# Patient Record
Sex: MALE | Race: BLACK OR AFRICAN AMERICAN | NOT HISPANIC OR LATINO | Employment: FULL TIME | URBAN - NONMETROPOLITAN AREA
[De-identification: names, ages, dates, MRNs, and addresses within clinical notes are randomized per-mention and may not be internally consistent; named-entity substitution may affect disease eponyms.]

---

## 2017-07-14 ENCOUNTER — LAB (OUTPATIENT)
Dept: LAB | Facility: HOSPITAL | Age: 52
End: 2017-07-14

## 2017-07-14 ENCOUNTER — OFFICE VISIT (OUTPATIENT)
Dept: CARDIOLOGY | Facility: CLINIC | Age: 52
End: 2017-07-14

## 2017-07-14 VITALS
DIASTOLIC BLOOD PRESSURE: 80 MMHG | SYSTOLIC BLOOD PRESSURE: 130 MMHG | HEART RATE: 72 BPM | BODY MASS INDEX: 43.12 KG/M2 | HEIGHT: 71 IN | WEIGHT: 308 LBS

## 2017-07-14 DIAGNOSIS — I10 ESSENTIAL HYPERTENSION: ICD-10-CM

## 2017-07-14 DIAGNOSIS — E66.01 MORBID OBESITY, UNSPECIFIED OBESITY TYPE (HCC): ICD-10-CM

## 2017-07-14 DIAGNOSIS — R06.02 SHORTNESS OF BREATH: ICD-10-CM

## 2017-07-14 DIAGNOSIS — E11.9 TYPE 2 DIABETES MELLITUS WITHOUT COMPLICATION, WITHOUT LONG-TERM CURRENT USE OF INSULIN (HCC): ICD-10-CM

## 2017-07-14 DIAGNOSIS — I42.9 CARDIOMYOPATHY (HCC): ICD-10-CM

## 2017-07-14 DIAGNOSIS — E11.9 TYPE 2 DIABETES MELLITUS WITHOUT COMPLICATION, WITHOUT LONG-TERM CURRENT USE OF INSULIN (HCC): Primary | ICD-10-CM

## 2017-07-14 LAB
ALBUMIN SERPL-MCNC: 4.2 G/DL (ref 3.4–4.8)
ALBUMIN/GLOB SERPL: 1.5 G/DL (ref 1.1–1.8)
ALP SERPL-CCNC: 131 U/L (ref 38–126)
ALT SERPL W P-5'-P-CCNC: 249 U/L (ref 21–72)
ANION GAP SERPL CALCULATED.3IONS-SCNC: 10 MMOL/L (ref 5–15)
ARTICHOKE IGE QN: 103 MG/DL (ref 1–129)
AST SERPL-CCNC: 91 U/L (ref 17–59)
BILIRUB SERPL-MCNC: 0.9 MG/DL (ref 0.2–1.3)
BUN BLD-MCNC: 10 MG/DL (ref 7–21)
BUN/CREAT SERPL: 9 (ref 7–25)
CALCIUM SPEC-SCNC: 9.1 MG/DL (ref 8.4–10.2)
CHLORIDE SERPL-SCNC: 103 MMOL/L (ref 95–110)
CHOLEST SERPL-MCNC: 176 MG/DL (ref 0–199)
CO2 SERPL-SCNC: 25 MMOL/L (ref 22–31)
CREAT BLD-MCNC: 1.11 MG/DL (ref 0.7–1.3)
DEPRECATED RDW RBC AUTO: 45.3 FL (ref 35.1–43.9)
ERYTHROCYTE [DISTWIDTH] IN BLOOD BY AUTOMATED COUNT: 13.3 % (ref 11.5–14.5)
GFR SERPL CREATININE-BSD FRML MDRD: 84 ML/MIN/1.73 (ref 56–130)
GLOBULIN UR ELPH-MCNC: 2.8 GM/DL (ref 2.3–3.5)
GLUCOSE BLD-MCNC: 98 MG/DL (ref 60–100)
HBA1C MFR BLD: 6.09 % (ref 4–5.6)
HCT VFR BLD AUTO: 42.1 % (ref 39–49)
HDLC SERPL-MCNC: 33 MG/DL (ref 60–200)
HGB BLD-MCNC: 13.8 G/DL (ref 13.7–17.3)
LDLC/HDLC SERPL: 3.42 {RATIO} (ref 0–3.55)
MCH RBC QN AUTO: 30.5 PG (ref 26.5–34)
MCHC RBC AUTO-ENTMCNC: 32.8 G/DL (ref 31.5–36.3)
MCV RBC AUTO: 92.9 FL (ref 80–98)
NT-PROBNP SERPL-MCNC: 229 PG/ML (ref 0–900)
PLATELET # BLD AUTO: 218 10*3/MM3 (ref 150–450)
PMV BLD AUTO: 10.4 FL (ref 8–12)
POTASSIUM BLD-SCNC: 3.8 MMOL/L (ref 3.5–5.1)
PROT SERPL-MCNC: 7 G/DL (ref 6.3–8.6)
RBC # BLD AUTO: 4.53 10*6/MM3 (ref 4.37–5.74)
SODIUM BLD-SCNC: 138 MMOL/L (ref 137–145)
T4 FREE SERPL-MCNC: 1 NG/DL (ref 0.78–2.19)
TRIGL SERPL-MCNC: 151 MG/DL (ref 20–199)
TSH SERPL DL<=0.05 MIU/L-ACNC: 0.87 MIU/ML (ref 0.46–4.68)
WBC NRBC COR # BLD: 5.39 10*3/MM3 (ref 3.2–9.8)

## 2017-07-14 PROCEDURE — 83036 HEMOGLOBIN GLYCOSYLATED A1C: CPT | Performed by: INTERNAL MEDICINE

## 2017-07-14 PROCEDURE — 80061 LIPID PANEL: CPT | Performed by: INTERNAL MEDICINE

## 2017-07-14 PROCEDURE — 80053 COMPREHEN METABOLIC PANEL: CPT | Performed by: INTERNAL MEDICINE

## 2017-07-14 PROCEDURE — 84439 ASSAY OF FREE THYROXINE: CPT | Performed by: INTERNAL MEDICINE

## 2017-07-14 PROCEDURE — 85027 COMPLETE CBC AUTOMATED: CPT | Performed by: INTERNAL MEDICINE

## 2017-07-14 PROCEDURE — 83880 ASSAY OF NATRIURETIC PEPTIDE: CPT | Performed by: INTERNAL MEDICINE

## 2017-07-14 PROCEDURE — 99214 OFFICE O/P EST MOD 30 MIN: CPT | Performed by: INTERNAL MEDICINE

## 2017-07-14 PROCEDURE — 36415 COLL VENOUS BLD VENIPUNCTURE: CPT | Performed by: INTERNAL MEDICINE

## 2017-07-14 PROCEDURE — 84443 ASSAY THYROID STIM HORMONE: CPT | Performed by: INTERNAL MEDICINE

## 2017-07-14 RX ORDER — FUROSEMIDE 20 MG/1
20 TABLET ORAL 2 TIMES DAILY
Qty: 180 TABLET | Refills: 3 | Status: SHIPPED | OUTPATIENT
Start: 2017-07-14 | End: 2018-05-02

## 2017-07-14 RX ORDER — SPIRONOLACTONE 25 MG/1
25 TABLET ORAL DAILY
Qty: 90 TABLET | Refills: 3 | Status: SHIPPED | OUTPATIENT
Start: 2017-07-14 | End: 2017-07-14 | Stop reason: SDUPTHER

## 2017-07-14 RX ORDER — CARVEDILOL 12.5 MG/1
12.5 TABLET ORAL 2 TIMES DAILY
Qty: 180 TABLET | Refills: 6 | Status: SHIPPED | OUTPATIENT
Start: 2017-07-14 | End: 2017-12-19 | Stop reason: SDUPTHER

## 2017-07-14 RX ORDER — DAPAGLIFLOZIN 5 MG/1
5 TABLET, FILM COATED ORAL DAILY
Qty: 30 TABLET | Refills: 8 | Status: SHIPPED | OUTPATIENT
Start: 2017-07-14 | End: 2020-03-16 | Stop reason: ALTCHOICE

## 2017-07-14 RX ORDER — CARVEDILOL 6.25 MG/1
6.25 TABLET ORAL 2 TIMES DAILY
Qty: 180 TABLET | Refills: 3 | Status: SHIPPED | OUTPATIENT
Start: 2017-07-14 | End: 2017-07-14 | Stop reason: SDUPTHER

## 2017-07-14 RX ORDER — LISINOPRIL 20 MG/1
20 TABLET ORAL DAILY
Qty: 90 TABLET | Refills: 6 | Status: SHIPPED | OUTPATIENT
Start: 2017-07-14 | End: 2017-12-19 | Stop reason: SDUPTHER

## 2017-07-14 RX ORDER — LISINOPRIL 10 MG/1
10 TABLET ORAL DAILY
Qty: 90 TABLET | Refills: 3 | Status: SHIPPED | OUTPATIENT
Start: 2017-07-14 | End: 2017-07-14 | Stop reason: SDUPTHER

## 2017-07-14 RX ORDER — SPIRONOLACTONE 25 MG/1
25 TABLET ORAL DAILY
Qty: 90 TABLET | Refills: 3 | Status: SHIPPED | OUTPATIENT
Start: 2017-07-14 | End: 2018-07-24 | Stop reason: SDUPTHER

## 2017-07-14 RX ORDER — FUROSEMIDE 20 MG/1
20 TABLET ORAL 2 TIMES DAILY
Qty: 180 TABLET | Refills: 3 | Status: SHIPPED | OUTPATIENT
Start: 2017-07-14 | End: 2017-07-14 | Stop reason: SDUPTHER

## 2017-07-14 NOTE — PROGRESS NOTES
Collin Teresa  52 y.o. male      1. Type 2 diabetes mellitus without complication, without long-term current use of insulin    2. Essential hypertension    3. Morbid obesity, unspecified obesity type    4. Shortness of breath    5. Cardiomyopathy        Chief complaint - follow-up dilated cardiomyopathy      History of present [id number]-old gentleman who had a normal cath in 2011 had an EF of 15-20%, was started on CHF therapy and his EF is improved to 50%.  And he is totally asymptomatic now.  He has no orthopnea or PND and is NYHA class II symptoms.  I asked him to lose more weight .  We'll continue the Ace inhibitors Coreg and the Lasix along with Aldactone for now. He is a diabetic is on for farxiga and abdomen to check all the labs today.  He is back to his normal work and I increased his Coreg and also his lisinopril.            No Known Allergies      Past Medical History:   Diagnosis Date   • Abdominal pain    • Cardiomegaly    • Chest pain    • Essential hypertension    • History of EKG 09/19/2016    [facility] center   • History of toxic effects     carbon monxide   • Morbid obesity    • Peripheral edema          Past Surgical History:   Procedure Laterality Date   • CARDIAC CATHETERIZATION      2011 no cad   • ENDOSCOPY           Family History   Problem Relation Age of Onset   • Diabetes Other    • Hypertension Other    • Coronary artery disease Neg Hx          Social History     Social History   • Marital status:      Spouse name: N/A   • Number of children: N/A   • Years of education: N/A     Occupational History   • Not on file.     Social History Main Topics   • Smoking status: Never Smoker   • Smokeless tobacco: Never Used   • Alcohol use No   • Drug use: No   • Sexual activity: Defer     Other Topics Concern   • Not on file     Social History Narrative         Current Outpatient Prescriptions   Medication Sig Dispense Refill   • carvedilol (COREG) 12.5 MG tablet Take 1 tablet by mouth 2  "(Two) Times a Day. 180 tablet 6   • FARXIGA 5 MG tablet tablet Take 1 tablet by mouth Daily. 30 tablet 8   • furosemide (LASIX) 20 MG tablet Take 1 tablet by mouth 2 (Two) Times a Day. 180 tablet 3   • lisinopril (PRINIVIL,ZESTRIL) 20 MG tablet Take 1 tablet by mouth Daily. 90 tablet 6   • promethazine-codeine (PHENERGAN with CODEINE) 6.25-10 MG/5ML syrup   0   • spironolactone (ALDACTONE) 25 MG tablet Take 1 tablet by mouth Daily. 90 tablet 3     No current facility-administered medications for this visit.          Review of Systems     Constitution: Denies any fatigue, fever or chills    HENT: Denies any headache, hearing impairment, nasal discharge or sore throat    Eyes: Denies any blurring of vision, or photophobia     Cardivascular - As per history of present illness     Respiratory system-denies any COPD, shortness of breath     Endocrine:   history of  diabetes       Musculoskeletal:  No history of arthritis, musculoskeletal problems    Gastrointestinal: No nausea, vomiting, or melena    Genitourinary: No dysuria or hematuria    Neurological:   No history of seizure disorder, stroke, memory problems    Psychiatric/Behavioral:        No history of depression, bipolar disorder or schizophrenia     Hematological- no history of easy bruising or any bleeding diathesis            OBJECTIVE    /80  Pulse 72  Ht 71\" (180.3 cm)  Wt (!) 308 lb (140 kg)  BMI 42.96 kg/m2      Physical Exam     Constitutional: is oriented to person, place, and time.     Skin-warm and dry    Well developed and nourished in no acute distress      Head: Normocephalic and atraumatic.     Eyes: Pupils are equal, round, and reactive to light.     Neck: Neck supple. No bruit in the carotids, no elevation of JVD    Cardiovascular: [place] in the fifth intercostal space, regular rate, and  Rhythm,  S1 greater than S2, no S3 or S4, no gallop   .    Pulmonary/Chest:   Air  Entry is equal on both sides  No wheezing or crackles,    "   Abdominal: Soft.  No hepatosplenomegaly, bowel sounds are present    Musculoskeletal: No kyphoscoliosis, no significant thickening of the joints    Neurological: is alert and oriented to person, place, and time.    cranial nerve are intact .   No motor or sensory deficit    Extremities-no edema,  pulses are felt equally on both sides, no radial femoral delay      Psychiatric: He has a normal mood and affect.                  His behavior is normal.           Procedures          A/P    Dilated cardiomyopathy-EF improved from 15-20% to 50%.  His blood pressure is still high, I increased Coreg to 12.5 twice a day and increased lisinopril to 20 mg daily.  We'll do all the labs today.    Diabetes is on Farxiga 5 mg daily.    Obesity talked to him about diet and exercise and losing some or weight he did gain 16 pounds from the last time I saw him.  He has avoided all sodas      Follow-up in 3 months        Zachary Nguyen MD  7/14/2017  9:13 AM

## 2017-12-19 ENCOUNTER — OFFICE VISIT (OUTPATIENT)
Dept: CARDIOLOGY | Facility: CLINIC | Age: 52
End: 2017-12-19

## 2017-12-19 VITALS
HEIGHT: 71 IN | SYSTOLIC BLOOD PRESSURE: 160 MMHG | HEART RATE: 81 BPM | WEIGHT: 315 LBS | DIASTOLIC BLOOD PRESSURE: 100 MMHG | BODY MASS INDEX: 44.1 KG/M2

## 2017-12-19 DIAGNOSIS — E66.01 MORBID OBESITY (HCC): ICD-10-CM

## 2017-12-19 DIAGNOSIS — I42.0 DILATED CARDIOMYOPATHY (HCC): ICD-10-CM

## 2017-12-19 DIAGNOSIS — E11.9 TYPE 2 DIABETES MELLITUS WITHOUT COMPLICATION, WITHOUT LONG-TERM CURRENT USE OF INSULIN (HCC): ICD-10-CM

## 2017-12-19 DIAGNOSIS — I10 ESSENTIAL HYPERTENSION: Primary | ICD-10-CM

## 2017-12-19 PROCEDURE — 99214 OFFICE O/P EST MOD 30 MIN: CPT | Performed by: INTERNAL MEDICINE

## 2017-12-19 RX ORDER — LISINOPRIL 20 MG/1
40 TABLET ORAL DAILY
Qty: 90 TABLET | Refills: 6 | Status: SHIPPED | OUTPATIENT
Start: 2017-12-19 | End: 2017-12-19 | Stop reason: SDUPTHER

## 2017-12-19 RX ORDER — CARVEDILOL 25 MG/1
25 TABLET ORAL 2 TIMES DAILY WITH MEALS
Qty: 180 TABLET | Refills: 6 | Status: SHIPPED | OUTPATIENT
Start: 2017-12-19 | End: 2019-07-29 | Stop reason: SDUPTHER

## 2017-12-19 RX ORDER — LISINOPRIL 40 MG/1
40 TABLET ORAL DAILY
Qty: 90 TABLET | Refills: 6 | Status: SHIPPED | OUTPATIENT
Start: 2017-12-19 | End: 2020-03-16 | Stop reason: ALTCHOICE

## 2017-12-19 RX ORDER — CARVEDILOL 12.5 MG/1
12.5 TABLET ORAL 2 TIMES DAILY WITH MEALS
Qty: 180 TABLET | Refills: 6 | Status: SHIPPED | OUTPATIENT
Start: 2017-12-19 | End: 2017-12-19 | Stop reason: SDUPTHER

## 2017-12-19 NOTE — PROGRESS NOTES
Collin Teresa  52 y.o. male    12/19/2017  1. Essential hypertension    2. Type 2 diabetes mellitus without complication, without long-term current use of insulin    3. Morbid obesity    4. Dilated cardiomyopathy        Chief complaint - follow-up dilated cardiomyopathy      History of present [id number]-old gentleman who had a normal cath in 2011 had an EF of 15-20%, was started on CHF therapy and his EF is improved to 50%.  And he is totally asymptomatic now.  He has no orthopnea or PND and is NYHA class II symptoms.  I asked him to lose more weight .  We'll continue the Ace inhibitors Coreg and the Lasix along with Aldactone for now. He is a diabetic is on for farxiga and abdomen to check all the labs today.  He is back to his normal work and I increased his Coreg and  his lisinopril As his blood pressure is high and he gained about 25 pounds            No Known Allergies      Past Medical History:   Diagnosis Date   • Abdominal pain    • Cardiomegaly    • Chest pain    • Essential hypertension    • History of EKG 09/19/2016    care center   • History of toxic effects     carbon monxide   • Morbid obesity    • Peripheral edema          Past Surgical History:   Procedure Laterality Date   • CARDIAC CATHETERIZATION      2011 no cad   • ENDOSCOPY           Family History   Problem Relation Age of Onset   • Diabetes Other    • Hypertension Other    • Coronary artery disease Neg Hx          Social History     Social History   • Marital status:      Spouse name: N/A   • Number of children: N/A   • Years of education: N/A     Occupational History   • Not on file.     Social History Main Topics   • Smoking status: Never Smoker   • Smokeless tobacco: Never Used   • Alcohol use No   • Drug use: No   • Sexual activity: Defer     Other Topics Concern   • Not on file     Social History Narrative         Current Outpatient Prescriptions   Medication Sig Dispense Refill   • carvedilol (COREG) 25 MG tablet Take  "1 tablet by mouth 2 (Two) Times a Day With Meals. 180 tablet 6   • FARXIGA 5 MG tablet tablet Take 1 tablet by mouth Daily. 30 tablet 8   • furosemide (LASIX) 20 MG tablet Take 1 tablet by mouth 2 (Two) Times a Day. 180 tablet 3   • lisinopril (PRINIVIL,ZESTRIL) 40 MG tablet Take 1 tablet by mouth Daily. 90 tablet 6   • promethazine-codeine (PHENERGAN with CODEINE) 6.25-10 MG/5ML syrup   0   • spironolactone (ALDACTONE) 25 MG tablet Take 1 tablet by mouth Daily. 90 tablet 3     No current facility-administered medications for this visit.          Review of Systems     Constitution: Denies any fatigue, fever or chills    HENT: Denies any headache, hearing impairment, nasal discharge or sore throat    Eyes: Denies any blurring of vision, or photophobia     Cardivascular - As per history of present illness     Respiratory system-denies any COPD, shortness of breath     Endocrine:   history of  diabetes       Musculoskeletal:  No history of arthritis, musculoskeletal problems    Gastrointestinal: No nausea, vomiting, or melena    Genitourinary: No dysuria or hematuria    Neurological:   No history of seizure disorder, stroke, memory problems    Psychiatric/Behavioral:        No history of depression, bipolar disorder or schizophrenia     Hematological- no history of easy bruising or any bleeding diathesis            OBJECTIVE    /100  Pulse 81  Ht 180.3 cm (70.98\")  Wt (!) 151 kg (333 lb)  BMI 46.47 kg/m2      Physical Exam     Constitutional: is oriented to person, place, and time.     Skin-warm and dry    Well developed and nourished in no acute distress      Head: Normocephalic and atraumatic.     Eyes: Pupils are equal, round, and reactive to light.     Neck: Neck supple. No bruit in the carotids, no elevation of JVD    Cardiovascular: [place] in the fifth intercostal space, regular rate, and  Rhythm,  S1 greater than S2, no S3 or S4, no gallop   .    Pulmonary/Chest:   Air  Entry is equal on both sides  No " wheezing or crackles,      Abdominal: Soft.  No hepatosplenomegaly, bowel sounds are present    Musculoskeletal: No kyphoscoliosis, no significant thickening of the joints    Neurological: is alert and oriented to person, place, and time.    cranial nerve are intact .   No motor or sensory deficit    Extremities-no edema,  pulses are felt equally on both sides, no radial femoral delay      Psychiatric: He has a normal mood and affect.                  His behavior is normal.           Procedures          A/P    Dilated cardiomyopathy-EF improved from 15-20% to 50%.  His blood pressure is still high, I increased Coreg to 25 twice a day and increased lisinopril to 40 mg daily.  We'll do all the labs within the next couple weeks    Diabetes is on Farxiga 5 mg daily.    Obesity talked to him about diet and exercise and losing some or weight he did gain 26 pounds from the last time I saw him.  He will do blood pressure checked regularly at his workplace      Follow-up in 6 months        Zachary Nguyen MD  12/19/2017  4:42 PM

## 2018-05-02 ENCOUNTER — OFFICE VISIT (OUTPATIENT)
Dept: CARDIOLOGY | Facility: CLINIC | Age: 53
End: 2018-05-02

## 2018-05-02 ENCOUNTER — APPOINTMENT (OUTPATIENT)
Dept: LAB | Facility: HOSPITAL | Age: 53
End: 2018-05-02

## 2018-05-02 VITALS
WEIGHT: 315 LBS | HEIGHT: 71 IN | HEART RATE: 105 BPM | BODY MASS INDEX: 44.1 KG/M2 | OXYGEN SATURATION: 95 % | DIASTOLIC BLOOD PRESSURE: 80 MMHG | SYSTOLIC BLOOD PRESSURE: 148 MMHG

## 2018-05-02 DIAGNOSIS — E11.9 TYPE 2 DIABETES MELLITUS WITHOUT COMPLICATION, WITHOUT LONG-TERM CURRENT USE OF INSULIN (HCC): ICD-10-CM

## 2018-05-02 DIAGNOSIS — I10 ESSENTIAL HYPERTENSION: Primary | ICD-10-CM

## 2018-05-02 DIAGNOSIS — I42.2 HYPERTROPHIC NONOBSTRUCTIVE CARDIOMYOPATHY (HCC): ICD-10-CM

## 2018-05-02 DIAGNOSIS — R06.02 SOB (SHORTNESS OF BREATH): ICD-10-CM

## 2018-05-02 DIAGNOSIS — I42.0 DILATED CARDIOMYOPATHY (HCC): ICD-10-CM

## 2018-05-02 LAB
ALBUMIN SERPL-MCNC: 3.8 G/DL (ref 3.4–4.8)
ALBUMIN/GLOB SERPL: 1.2 G/DL (ref 1.1–1.8)
ALP SERPL-CCNC: 68 U/L (ref 38–126)
ALT SERPL W P-5'-P-CCNC: 48 U/L (ref 21–72)
ANION GAP SERPL CALCULATED.3IONS-SCNC: 11 MMOL/L (ref 5–15)
AST SERPL-CCNC: 28 U/L (ref 17–59)
BILIRUB SERPL-MCNC: 0.5 MG/DL (ref 0.2–1.3)
BUN BLD-MCNC: 15 MG/DL (ref 7–21)
BUN/CREAT SERPL: 11.1 (ref 7–25)
CALCIUM SPEC-SCNC: 8.7 MG/DL (ref 8.4–10.2)
CHLORIDE SERPL-SCNC: 104 MMOL/L (ref 95–110)
CO2 SERPL-SCNC: 24 MMOL/L (ref 22–31)
CREAT BLD-MCNC: 1.35 MG/DL (ref 0.7–1.3)
DEPRECATED RDW RBC AUTO: 45 FL (ref 35.1–43.9)
ERYTHROCYTE [DISTWIDTH] IN BLOOD BY AUTOMATED COUNT: 13.7 % (ref 11.5–14.5)
GFR SERPL CREATININE-BSD FRML MDRD: 67 ML/MIN/1.73 (ref 56–130)
GLOBULIN UR ELPH-MCNC: 3.1 GM/DL (ref 2.3–3.5)
GLUCOSE BLD-MCNC: 97 MG/DL (ref 60–100)
HCT VFR BLD AUTO: 43.4 % (ref 39–49)
HGB BLD-MCNC: 14.4 G/DL (ref 13.7–17.3)
MCH RBC QN AUTO: 30.3 PG (ref 26.5–34)
MCHC RBC AUTO-ENTMCNC: 33.2 G/DL (ref 31.5–36.3)
MCV RBC AUTO: 91.2 FL (ref 80–98)
NT-PROBNP SERPL-MCNC: 2620 PG/ML (ref 0–900)
PLATELET # BLD AUTO: 228 10*3/MM3 (ref 150–450)
PMV BLD AUTO: 10.4 FL (ref 8–12)
POTASSIUM BLD-SCNC: 3.9 MMOL/L (ref 3.5–5.1)
PROT SERPL-MCNC: 6.9 G/DL (ref 6.3–8.6)
RBC # BLD AUTO: 4.76 10*6/MM3 (ref 4.37–5.74)
SODIUM BLD-SCNC: 139 MMOL/L (ref 137–145)
T4 FREE SERPL-MCNC: 1.37 NG/DL (ref 0.78–2.19)
TSH SERPL DL<=0.05 MIU/L-ACNC: 1.12 MIU/ML (ref 0.46–4.68)
WBC NRBC COR # BLD: 6.75 10*3/MM3 (ref 3.2–9.8)

## 2018-05-02 PROCEDURE — 85027 COMPLETE CBC AUTOMATED: CPT | Performed by: INTERNAL MEDICINE

## 2018-05-02 PROCEDURE — 96372 THER/PROPH/DIAG INJ SC/IM: CPT | Performed by: INTERNAL MEDICINE

## 2018-05-02 PROCEDURE — 83036 HEMOGLOBIN GLYCOSYLATED A1C: CPT | Performed by: INTERNAL MEDICINE

## 2018-05-02 PROCEDURE — 83880 ASSAY OF NATRIURETIC PEPTIDE: CPT | Performed by: INTERNAL MEDICINE

## 2018-05-02 PROCEDURE — 80053 COMPREHEN METABOLIC PANEL: CPT | Performed by: INTERNAL MEDICINE

## 2018-05-02 PROCEDURE — 99214 OFFICE O/P EST MOD 30 MIN: CPT | Performed by: INTERNAL MEDICINE

## 2018-05-02 PROCEDURE — 93000 ELECTROCARDIOGRAM COMPLETE: CPT | Performed by: INTERNAL MEDICINE

## 2018-05-02 PROCEDURE — 84443 ASSAY THYROID STIM HORMONE: CPT | Performed by: INTERNAL MEDICINE

## 2018-05-02 PROCEDURE — 36415 COLL VENOUS BLD VENIPUNCTURE: CPT | Performed by: INTERNAL MEDICINE

## 2018-05-02 PROCEDURE — 84439 ASSAY OF FREE THYROXINE: CPT | Performed by: INTERNAL MEDICINE

## 2018-05-02 RX ORDER — FUROSEMIDE 10 MG/ML
40 INJECTION INTRAMUSCULAR; INTRAVENOUS ONCE
Status: COMPLETED | OUTPATIENT
Start: 2018-05-02 | End: 2018-05-02

## 2018-05-02 RX ORDER — DIGOXIN 125 MCG
125 TABLET ORAL DAILY
Qty: 30 TABLET | Refills: 11 | Status: SHIPPED | OUTPATIENT
Start: 2018-05-02 | End: 2019-07-30 | Stop reason: SDUPTHER

## 2018-05-02 RX ORDER — FUROSEMIDE 40 MG/1
40 TABLET ORAL 2 TIMES DAILY
Qty: 60 TABLET | Refills: 11 | Status: SHIPPED | OUTPATIENT
Start: 2018-05-02 | End: 2018-06-05 | Stop reason: SDUPTHER

## 2018-05-02 RX ORDER — ALBUTEROL SULFATE 90 UG/1
2 AEROSOL, METERED RESPIRATORY (INHALATION) EVERY 4 HOURS PRN
COMMUNITY
End: 2022-01-20

## 2018-05-02 RX ADMIN — FUROSEMIDE 40 MG: 10 INJECTION INTRAMUSCULAR; INTRAVENOUS at 15:30

## 2018-05-02 NOTE — PROGRESS NOTES
[facility]  OFFICE NOTE    Collin Tersea  53 y.o. male    05/02/2018  1. Essential hypertension    2. Type 2 diabetes mellitus without complication, without long-term current use of insulin    3. SOB (shortness of breath)    4. Dilated cardiomyopathy    5. Hypertrophic nonobstructive cardiomyopathy        Chief complaint -Orthopnea with shortness of breath      History of present [id number]-ynrc-old gentleman with the nonischemic cardiomyopathy by cardiac catheterization 2011 and he was on CHF therapy.  I have decreased his Lasix since his EF improved to 50% and now he has orthopnea and PND.  And there is swelling of the feet.  He is a diabetic on farxiga.  Today I gave him IV Lasix through subcutaneous route.  Increase her Lasix to 40 mg twice a day and also started Lanoxin.  We'll check an echo and all the labs today and he'll be again followed on Friday.  If he doesn't improve then we will have to admit him to the hospital.  He has gained about 25 pounds from July.        No Known Allergies      Past Medical History:   Diagnosis Date   • Abdominal pain    • Cardiomegaly    • Chest pain    • Essential hypertension    • History of EKG 09/19/2016    care center   • History of toxic effects     carbon monxide   • Morbid obesity    • Peripheral edema          Past Surgical History:   Procedure Laterality Date   • CARDIAC CATHETERIZATION      2011 no cad   • ENDOSCOPY           Family History   Problem Relation Age of Onset   • Diabetes Other    • Hypertension Other    • Coronary artery disease Neg Hx          Social History     Social History   • Marital status:      Spouse name: N/A   • Number of children: N/A   • Years of education: N/A     Occupational History   • Not on file.     Social History Main Topics   • Smoking status: Never Smoker   • Smokeless tobacco: Never Used   • Alcohol use No   • Drug use: No   • Sexual activity: Defer     Other Topics Concern   • Not on  "file     Social History Narrative   • No narrative on file         Current Outpatient Prescriptions   Medication Sig Dispense Refill   • albuterol (PROAIR HFA) 108 (90 Base) MCG/ACT inhaler Inhale 2 puffs Every 4 (Four) Hours As Needed for Wheezing.     • carvedilol (COREG) 25 MG tablet Take 1 tablet by mouth 2 (Two) Times a Day With Meals. 180 tablet 6   • FARXIGA 5 MG tablet tablet Take 1 tablet by mouth Daily. 30 tablet 8   • lisinopril (PRINIVIL,ZESTRIL) 40 MG tablet Take 1 tablet by mouth Daily. 90 tablet 6   • promethazine-codeine (PHENERGAN with CODEINE) 6.25-10 MG/5ML syrup   0   • spironolactone (ALDACTONE) 25 MG tablet Take 1 tablet by mouth Daily. 90 tablet 3   • digoxin (LANOXIN) 125 MCG tablet Take 1 tablet by mouth Daily. 30 tablet 11   • furosemide (LASIX) 40 MG tablet Take 1 tablet by mouth 2 (Two) Times a Day. 60 tablet 11     No current facility-administered medications for this visit.          Review of Systems     Constitution: Denies any fatigue, fever or chills    HENT: Denies any headache, hearing impairment,     Eyes: Denies any blurring of vision, or photophobia     Cardivascular - As per history of present illness     Respiratory system-Shortness of breath NYHA class IIb   sleep apnea.     Endocrine:   history of hyperlipidemia, diabetes,                             Musculoskeletal:  No history of arthritis with musculoskeletal problems    Gastrointestinal: No nausea, vomiting, or melena    Genitourinary: No dysuria or hematuria    Neurological:   No history of seizure disorder, stroke, memory problems    Psychiatric/Behavioral:        No history of depression    Hematological- no history of easy bruising            OBJECTIVE    /80 (BP Location: Right arm, Patient Position: Sitting, Cuff Size: Adult)   Pulse 105   Ht 180.3 cm (71\")   Wt (!) 152 kg (335 lb)   SpO2 95%   BMI 46.72 kg/m²       Physical Exam     Constitutional: is oriented to person, place, and time.     Skin-warm " and dry    Well developed and nourished in no acute distress      Head: Normocephalic and atraumatic.     Eyes: Pupils are equal    Neck: Neck supple. No bruit in the carotids JVD is distended and elevated    Cardiovascular: [place] in the fifth intercostal space   Regular rate, and  Rhythm,    S1 greater than S2, no S3 or S4, no gallop     Pulmonary/Chest:   Air  Entry is equal on both sides  No wheezing or crackles,      Abdominal: Soft.  No hepatosplenomegaly, bowel sounds are present    Musculoskeletal: No kyphoscoliosis, no significant thickening of the joints    Neurological: is alert and oriented to person, place, and time.    cranial nerve are intact .   No motor or sensory deficit    Extremities- 1+ bilateral edema, no radial femoral delay      Psychiatric: He has a normal mood and affect.                  His behavior is normal.             ECG 12 Lead  Date/Time: 5/2/2018 3:40 PM  Performed by: CHITO RIOS  Authorized by: CHITO RIOS   Comparison: not compared with previous ECG   Rhythm: sinus rhythm  Comments: Sinus rhythm with left atrial abnormality with interventricular conduction delay with nonprogression of R-wave in V1 to V5              Lab Results   Component Value Date    WBC 5.39 07/14/2017    HGB 13.8 07/14/2017    HCT 42.1 07/14/2017    MCV 92.9 07/14/2017     07/14/2017     Lab Results   Component Value Date    GLUCOSE 98 07/14/2017    BUN 10 07/14/2017    CREATININE 1.11 07/14/2017    EGFRIFAFRI 84 07/14/2017    BCR 9.0 07/14/2017    CO2 25.0 07/14/2017    CALCIUM 9.1 07/14/2017    ALBUMIN 4.20 07/14/2017    LABIL2 1.5 07/14/2017    AST 91 (H) 07/14/2017     (H) 07/14/2017     Lab Results   Component Value Date    CHOL 176 07/14/2017     Lab Results   Component Value Date    TRIG 151 07/14/2017     Lab Results   Component Value Date    HDL 33 (L) 07/14/2017     No components found for: LDLCALC  Lab Results   Component Value Date     07/14/2017     No  results found for: HDLLDLRATIO  No components found for: CHOLHDL  Lab Results   Component Value Date    HGBA1C 6.09 (H) 07/14/2017     Lab Results   Component Value Date    TSH 0.870 07/14/2017                  A/P    Decompensated CHF with history of nonischemic cardiomyopathy, gave Lasix 40 mg subcutaneous, increase Lasix to 40 mg twice a day.  We'll recheck again on Friday.  Will check labs of BNP CMP, CBC hemoglobin A1c.  Will get an echo to reassess the ejection fraction as his most recent echo showed an EF of 50%.    Diabetes on oral agents with farxiga.    Follow-up on Friday in the CHF clinic and if not better will admit him to the hospital.            This document has been electronically signed by Zachary Nguyen MD on May 2, 2018 3:34 PM       EMR Dragon/Transcription disclaimer:   Some of this note may be an electronic transcription/translation of spoken language to printed text. The electronic translation of spoken language may permit erroneous, or at times, nonsensical words or phrases to be inadvertently transcribed; Although I have reviewed the note for such errors, some may still exist.

## 2018-05-03 LAB — HBA1C MFR BLD: 5.9 % (ref 4–5.6)

## 2018-05-03 NOTE — PROGRESS NOTES
Subjective:     CC: CHF f/u    Congestive Heart Failure   Presents for initial visit. The disease course has been worsening. Associated symptoms include edema, fatigue, orthopnea, paroxysmal nocturnal dyspnea, shortness of breath and unexpected weight change. Pertinent negatives include no abdominal pain, chest pain, claudication or palpitations. The symptoms have been worsening. Past treatments include ACE inhibitors, aldosterone receptor blockers, beta blockers, digoxin and salt and fluid restriction. The treatment provided moderate relief. Compliance with prior treatments has been good. His past medical history is significant for DM and HTN. There is no history of CAD, chronic lung disease, hyperthyroidism or valvular heart disease.     History of present [id number]-old gentleman with the nonischemic cardiomyopathy by cardiac catheterization 2011 who has been on OMT since. Dr. Sharma had decreased his Lasix since his EF improved to 50% and now he has presented with orthopnea, PND, and swelling of the feet.  He is a diabetic on farxiga.    Wednesday he received 40mg Lasix through subcutaneous route.    Increased his Lasix to 40 mg twice a day and also started Lanoxin.    Recheck on Friday revealed a 10lb weight loss and improvement in symptoms. He slept well the past 2 nights and has voided considerably. Leg edema is absent.    Diet: eats out 4 times a week. 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 He enjoys chuckwagons, hot wings, cheeseburgers, etc.   Fluids: He has never watched how much fluid he takes in. He does not drink much water or pop, mostly tea. He drinks from a large glass and has probably 4-5 a day.   Activity: He used to walk often for exercise. At one point he lost over 30lbs. He does not walk any now, but is still working.   Medication compliance: He is faithful with his  medications and doctor visits.     Review of Systems   Constitution: Positive for fatigue, unexpected weight change and weight gain. Negative for chills, decreased appetite, fever and weakness.   HENT: Negative.    Eyes: Negative.    Cardiovascular: Positive for dyspnea on exertion, leg swelling, orthopnea and paroxysmal nocturnal dyspnea. Negative for chest pain, claudication, irregular heartbeat and palpitations.   Respiratory: Positive for shortness of breath. Negative for cough and wheezing.    Endocrine: Negative.    Skin: Negative for dry skin, flushing and rash.   Musculoskeletal: Negative for falls and myalgias.   Gastrointestinal: Negative for abdominal pain, change in bowel habit and melena.   Genitourinary: Negative for frequency and hematuria.   Neurological: Negative for dizziness, light-headedness and loss of balance.   Psychiatric/Behavioral: Negative for altered mental status and memory loss. The patient is not nervous/anxious.        Current Outpatient Prescriptions   Medication Sig Dispense Refill   • albuterol (PROAIR HFA) 108 (90 Base) MCG/ACT inhaler Inhale 2 puffs Every 4 (Four) Hours As Needed for Wheezing.     • carvedilol (COREG) 25 MG tablet Take 1 tablet by mouth 2 (Two) Times a Day With Meals. 180 tablet 6   • digoxin (LANOXIN) 125 MCG tablet Take 1 tablet by mouth Daily. 30 tablet 11   • FARXIGA 5 MG tablet tablet Take 1 tablet by mouth Daily. 30 tablet 8   • furosemide (LASIX) 40 MG tablet Take 1 tablet by mouth 2 (Two) Times a Day. 60 tablet 11   • lisinopril (PRINIVIL,ZESTRIL) 40 MG tablet Take 1 tablet by mouth Daily. 90 tablet 6   • potassium chloride (K-DUR) 10 MEQ CR tablet Take 2 tablets by mouth 2 (Two) Times a Day With Meals for 3 days. 12 tablet 0   • promethazine-codeine (PHENERGAN with CODEINE) 6.25-10 MG/5ML syrup   0   • spironolactone (ALDACTONE) 25 MG tablet Take 1 tablet by mouth Daily. 90 tablet 3     No current facility-administered medications for this visit.          Objective:     Vitals:    05/04/18 0912   BP: 108/72   BP Location: Left arm   Pulse: 78   Resp: 18   SpO2: 96%   Weight: (!) 147 kg (324 lb)     Wt Readings from Last 3 Encounters:   05/04/18 (!) 147 kg (324 lb)   05/02/18 (!) 152 kg (335 lb)   12/19/17 (!) 151 kg (333 lb)          Physical Exam   Constitutional: He is oriented to person, place, and time. He appears well-developed and well-nourished. No distress.   HENT:   Head: Normocephalic.   Neck: No JVD present.   Cardiovascular: Normal rate, regular rhythm, S1 normal, S2 normal, normal heart sounds and intact distal pulses.    No murmur heard.  Pulmonary/Chest: Effort normal and breath sounds normal. No respiratory distress. He has no wheezes. He has no rales.   Abdominal: Soft. Bowel sounds are normal. He exhibits distension.   Musculoskeletal: Normal range of motion. He exhibits edema.   Neurological: He is alert and oriented to person, place, and time.   Skin: Skin is warm and dry. No erythema.   Psychiatric: He has a normal mood and affect. His behavior is normal. Judgment and thought content normal.       Cardiographics  Echocardiogram: 12/2/16    ECHO 9/29/16      ECG:  ECG 12 Lead  Date/Time: 5/2/2018 3:40 PM  Performed by: CHITO RIOS  Authorized by: CHITO RIOS   Comparison: not compared with previous ECG   Rhythm: sinus rhythm  Comments: Sinus rhythm with left atrial abnormality with interventricular   conduction delay with nonprogression of R-wave in V1 to V5    Stress Testing:     Imaging  Chest x-ray: 9/2016  CONCLUSION-    No acute pulmonary disease.  Cardiomegaly.      Lab Review   Lab Results   Component Value Date    GLUCOSE 97 05/02/2018    BUN 15 05/02/2018    CREATININE 1.35 (H) 05/02/2018    EGFRIFAFRI 67 05/02/2018    BCR 11.1 05/02/2018    K 3.9 05/02/2018    CO2 24.0 05/02/2018    CALCIUM 8.7 05/02/2018    ALBUMIN 3.80 05/02/2018    LABIL2 1.2 05/02/2018    AST 28 05/02/2018    ALT 48 05/02/2018     Lab Results    Component Value Date    WBC 6.75 05/02/2018    HGB 14.4 05/02/2018    HCT 43.4 05/02/2018    MCV 91.2 05/02/2018     05/02/2018     Lab Results   Component Value Date    CHOL 176 07/14/2017    TRIG 151 07/14/2017    HDL 33 (L) 07/14/2017     07/14/2017     Lab Results   Component Value Date    HGBA1C 5.9 (H) 05/02/2018     Lab Results   Component Value Date    TSH 1.120 05/02/2018      Ref. Range 7/14/2017 13:50 5/2/2018 15:42   proBNP Latest Ref Range: 0.0 - 900.0 pg/mL 229.0 2,620.0 (H)       The following portions of the patient's history were reviewed and updated as appropriate: allergies, current medications, past family history, past medical history, past social history, past surgical history and problem list.  Old records reviewed and pertinent information is included in the above objective data.      Assessment/Plan:   10lbs weight loss with Lasix 40mg SQ and BID dosing since Wednesday. Orthopnea improved and he had finally slept well for the first time all week.   He has echo scheduled for 5/15/18. Will continue BID dosing of lasix and recheck him and labs next Wednesday.      Diagnosis Plan   1. Acute on chronic systolic CHF (congestive heart failure)     Presented an overview of heart failure, expected course, considerations, risk factors and exacerbation prevention.  Recommended daily weight monitoring. Information provided.  Recommended restricted dietary sodium intake of 2g/day  Fluid restrictions of 2L/day.  Discussed patient action plan for heart failure;  Recommended avoiding NSAIDs use.  Discussed warning signs requiring additional medical attention for heart failure.  Do not hesitate to contact this office for further symptoms or concerns. Contact information provided to the patient and understanding verbalized.       30 minutes out of 40 minutes face to face spent in counseling/coordination of care as relates to presentation of CHF with dietary and lifestyle  modifications/procedures as outlined above.     NICM, HFrEF improved  EF:50% from 15%. NYHA Class III, Stage C. Patient is currently volume overloaded.  and in a well perfused physiologic state. Hemodynamics are acceptable  BETA-BLOCKER: Coreg 25mg BID          ACE/ARB: Lisinopril 40mg  ENTRESTO: not indicated  DIURETIC: Lasix 40mg BID  ALDOSTERONT ANTAGONIST: 25mg daily  IMDUR/HYDRALAZINE: N/A  DIGOXIN: Added 5/2/18- 125mcg  Fluid restriction: 2 L  Sodium restriction:2 grams  6MWT: yes As outpatient  Cardiac Rehab: not indicated  ICD: EF improved  ADHF: yes, 5/2/18  LVAD: not indicated    Lasix injection:  40mg SQ 5/2/18  40mg SQ 5/4/18    DUE TO FINDINGS OF HYPERVOLEMIA IN THE SETTING OF HFpEF WE WILL PROCEED WITH LASIX 40mg MG SUBCUTANEOUS INFUSION.   TIME:0935  LASIX 40mg/4ML SUBCUTANEOUS right LOWER QUADRANT OF ABDOMEN INFUSED OVER 10 MINUTES FOLLOWED BY 0.9% NA+ CHLORIDE FLUSH OF 10ML INFUSION PER ANNA MARIE CARNES.    Added 3 day K replacement- 20mEq BID x 3 days       2. SOB (shortness of breath) & orthopnea Improved with Lasix. He is still SOA with exertion.        Follow up on Wednesday after work at 4pm. Call to be seen sooner.

## 2018-05-04 ENCOUNTER — OFFICE VISIT (OUTPATIENT)
Dept: CARDIOLOGY | Facility: CLINIC | Age: 53
End: 2018-05-04

## 2018-05-04 VITALS
DIASTOLIC BLOOD PRESSURE: 72 MMHG | RESPIRATION RATE: 18 BRPM | HEART RATE: 78 BPM | OXYGEN SATURATION: 96 % | WEIGHT: 315 LBS | SYSTOLIC BLOOD PRESSURE: 108 MMHG | BODY MASS INDEX: 45.19 KG/M2

## 2018-05-04 DIAGNOSIS — R06.02 SOB (SHORTNESS OF BREATH): ICD-10-CM

## 2018-05-04 DIAGNOSIS — I50.23 ACUTE ON CHRONIC SYSTOLIC CHF (CONGESTIVE HEART FAILURE) (HCC): Primary | ICD-10-CM

## 2018-05-04 PROCEDURE — 99215 OFFICE O/P EST HI 40 MIN: CPT | Performed by: NURSE PRACTITIONER

## 2018-05-04 PROCEDURE — 96372 THER/PROPH/DIAG INJ SC/IM: CPT | Performed by: NURSE PRACTITIONER

## 2018-05-04 RX ORDER — POTASSIUM CHLORIDE 750 MG/1
20 TABLET, FILM COATED, EXTENDED RELEASE ORAL 2 TIMES DAILY WITH MEALS
Qty: 12 TABLET | Refills: 0 | Status: SHIPPED | OUTPATIENT
Start: 2018-05-04 | End: 2018-05-07

## 2018-05-04 RX ORDER — FUROSEMIDE 10 MG/ML
40 INJECTION INTRAMUSCULAR; INTRAVENOUS ONCE
Status: COMPLETED | OUTPATIENT
Start: 2018-05-04 | End: 2018-05-04

## 2018-05-04 RX ADMIN — FUROSEMIDE 40 MG: 10 INJECTION INTRAMUSCULAR; INTRAVENOUS at 09:33

## 2018-05-09 ENCOUNTER — LAB (OUTPATIENT)
Dept: LAB | Facility: HOSPITAL | Age: 53
End: 2018-05-09

## 2018-05-09 ENCOUNTER — OFFICE VISIT (OUTPATIENT)
Dept: CARDIOLOGY | Facility: CLINIC | Age: 53
End: 2018-05-09

## 2018-05-09 VITALS
BODY MASS INDEX: 44.1 KG/M2 | SYSTOLIC BLOOD PRESSURE: 122 MMHG | DIASTOLIC BLOOD PRESSURE: 78 MMHG | HEIGHT: 71 IN | HEART RATE: 60 BPM | WEIGHT: 315 LBS | OXYGEN SATURATION: 98 %

## 2018-05-09 DIAGNOSIS — R06.02 SOB (SHORTNESS OF BREATH): ICD-10-CM

## 2018-05-09 DIAGNOSIS — I50.23 ACUTE ON CHRONIC SYSTOLIC CHF (CONGESTIVE HEART FAILURE) (HCC): ICD-10-CM

## 2018-05-09 DIAGNOSIS — I50.23 ACUTE ON CHRONIC SYSTOLIC CHF (CONGESTIVE HEART FAILURE) (HCC): Primary | ICD-10-CM

## 2018-05-09 LAB
ALBUMIN SERPL-MCNC: 4.3 G/DL (ref 3.4–4.8)
ALBUMIN/GLOB SERPL: 1.3 G/DL (ref 1.1–1.8)
ALP SERPL-CCNC: 73 U/L (ref 38–126)
ALT SERPL W P-5'-P-CCNC: 39 U/L (ref 21–72)
ANION GAP SERPL CALCULATED.3IONS-SCNC: 12 MMOL/L (ref 5–15)
AST SERPL-CCNC: 30 U/L (ref 17–59)
BILIRUB SERPL-MCNC: 1 MG/DL (ref 0.2–1.3)
BUN BLD-MCNC: 20 MG/DL (ref 7–21)
BUN/CREAT SERPL: 15 (ref 7–25)
CALCIUM SPEC-SCNC: 9.4 MG/DL (ref 8.4–10.2)
CHLORIDE SERPL-SCNC: 100 MMOL/L (ref 95–110)
CO2 SERPL-SCNC: 24 MMOL/L (ref 22–31)
CREAT BLD-MCNC: 1.33 MG/DL (ref 0.7–1.3)
GFR SERPL CREATININE-BSD FRML MDRD: 68 ML/MIN/1.73 (ref 56–130)
GLOBULIN UR ELPH-MCNC: 3.3 GM/DL (ref 2.3–3.5)
GLUCOSE BLD-MCNC: 89 MG/DL (ref 60–100)
POTASSIUM BLD-SCNC: 4.1 MMOL/L (ref 3.5–5.1)
PROT SERPL-MCNC: 7.6 G/DL (ref 6.3–8.6)
SODIUM BLD-SCNC: 136 MMOL/L (ref 137–145)

## 2018-05-09 PROCEDURE — 80053 COMPREHEN METABOLIC PANEL: CPT

## 2018-05-09 PROCEDURE — 36415 COLL VENOUS BLD VENIPUNCTURE: CPT

## 2018-05-09 PROCEDURE — 99214 OFFICE O/P EST MOD 30 MIN: CPT | Performed by: NURSE PRACTITIONER

## 2018-05-09 NOTE — PROGRESS NOTES
Subjective:     CC: CHF f/u    Congestive Heart Failure   Presents for follow-up visit. The disease course has been worsening. Associated symptoms include fatigue and shortness of breath. Pertinent negatives include no abdominal pain, chest pain, claudication, edema, orthopnea, palpitations, paroxysmal nocturnal dyspnea or unexpected weight change. The symptoms have been improving. Past treatments include ACE inhibitors, aldosterone receptor blockers, beta blockers, digoxin and salt and fluid restriction. The treatment provided moderate relief. Compliance with prior treatments has been good. His past medical history is significant for DM and HTN. There is no history of CAD, chronic lung disease, hyperthyroidism or valvular heart disease.     History of present [id number]old gentleman with the nonischemic cardiomyopathy by cardiac catheterization 2011 who has been on OMT since. Dr. Sharma had decreased his Lasix since his EF improved to 50% and now he has presented with orthopnea, PND, and swelling of the feet.  He is a diabetic on farxiga.    Wednesday he received 40mg Lasix through subcutaneous route.    Increased his Lasix to 40 mg twice a day and also started Lanoxin.    Recheck on Friday revealed a 10lb weight loss and improvement in symptoms. He slept well the past 2 nights and has voided considerably. Leg edema is absent.  Recheck the following Wednesday (5/9/18) revealed an 18lb loss total and change in attitude towards disease process. He has not ate out all week and is committed to eating better foods and exercising more.     Diet: eats out 4 times a week. 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 He enjoys [id number], hot wings, cheeseburgers, etc.   Fluids: He has never watched how much fluid he takes in. He does not drink much water or pop, mostly tea. He drinks from a large glass  and has probably 4-5 a day.   Activity: He used to walk often for exercise. At one point he lost over 30lbs. He does not walk any now, but is still working.   Medication compliance: He is faithful with his medications and doctor visits.     Review of Systems   Constitution: Positive for fatigue and weight gain. Negative for chills, decreased appetite, fever, weakness and unexpected weight change.   HENT: Negative.    Eyes: Negative.    Cardiovascular: Positive for dyspnea on exertion, leg swelling, orthopnea and paroxysmal nocturnal dyspnea. Negative for chest pain, claudication, irregular heartbeat and palpitations.   Respiratory: Positive for shortness of breath. Negative for cough and wheezing.    Endocrine: Negative.    Skin: Negative for dry skin, flushing and rash.   Musculoskeletal: Negative for falls and myalgias.   Gastrointestinal: Negative for abdominal pain, change in bowel habit and melena.   Genitourinary: Negative for frequency and hematuria.   Neurological: Negative for dizziness, light-headedness and loss of balance.   Psychiatric/Behavioral: Negative for altered mental status and memory loss. The patient is not nervous/anxious.        Current Outpatient Prescriptions   Medication Sig Dispense Refill   • albuterol (PROAIR HFA) 108 (90 Base) MCG/ACT inhaler Inhale 2 puffs Every 4 (Four) Hours As Needed for Wheezing.     • carvedilol (COREG) 25 MG tablet Take 1 tablet by mouth 2 (Two) Times a Day With Meals. 180 tablet 6   • digoxin (LANOXIN) 125 MCG tablet Take 1 tablet by mouth Daily. 30 tablet 11   • FARXIGA 5 MG tablet tablet Take 1 tablet by mouth Daily. 30 tablet 8   • furosemide (LASIX) 40 MG tablet Take 1 tablet by mouth 2 (Two) Times a Day. 60 tablet 11   • lisinopril (PRINIVIL,ZESTRIL) 40 MG tablet Take 1 tablet by mouth Daily. 90 tablet 6   • promethazine-codeine (PHENERGAN with CODEINE) 6.25-10 MG/5ML syrup   0   • spironolactone (ALDACTONE) 25 MG tablet Take 1 tablet by mouth Daily. 90  "tablet 3     No current facility-administered medications for this visit.         Objective:     Vitals:    05/09/18 1555   BP: 122/78   Pulse: 60   SpO2: 98%   Weight: (!) 145 kg (318 lb 11.2 oz)   Height: 180.3 cm (71\")     Wt Readings from Last 3 Encounters:   05/09/18 (!) 145 kg (318 lb 11.2 oz)   05/04/18 (!) 147 kg (324 lb)   05/02/18 (!) 152 kg (335 lb)          Physical Exam   Constitutional: He is oriented to person, place, and time. He appears well-developed and well-nourished. No distress.   HENT:   Head: Normocephalic.   Neck: No JVD present.   Cardiovascular: Normal rate, regular rhythm, S1 normal, S2 normal, normal heart sounds and intact distal pulses.    No murmur heard.  Pulmonary/Chest: Effort normal and breath sounds normal. No respiratory distress. He has no wheezes. He has no rales.   Abdominal: Soft. Bowel sounds are normal. He exhibits distension.   Musculoskeletal: Normal range of motion. He exhibits edema.   Neurological: He is alert and oriented to person, place, and time.   Skin: Skin is warm and dry. No erythema.   Psychiatric: He has a normal mood and affect. His behavior is normal. Judgment and thought content normal.       Cardiographics  Echocardiogram: 12/2/16    ECHO 9/29/16      ECG:  ECG 12 Lead  Date/Time: 5/2/2018 3:40 PM  Performed by: CHITO RIOS  Authorized by: CHITO RIOS   Comparison: not compared with previous ECG   Rhythm: sinus rhythm  Comments: Sinus rhythm with left atrial abnormality with interventricular   conduction delay with nonprogression of R-wave in V1 to V5    Stress Testing:     Imaging  Chest x-ray: 9/2016  CONCLUSION-    No acute pulmonary disease.  Cardiomegaly.      Lab Review   Lab Results   Component Value Date    GLUCOSE 89 05/09/2018    BUN 20 05/09/2018    CREATININE 1.33 (H) 05/09/2018    EGFRIFAFRI 68 05/09/2018    BCR 15.0 05/09/2018    K 4.1 05/09/2018    CO2 24.0 05/09/2018    CALCIUM 9.4 05/09/2018    ALBUMIN 4.30 05/09/2018 "    LABIL2 1.3 05/09/2018    AST 30 05/09/2018    ALT 39 05/09/2018     Lab Results   Component Value Date    WBC 6.75 05/02/2018    HGB 14.4 05/02/2018    HCT 43.4 05/02/2018    MCV 91.2 05/02/2018     05/02/2018     Lab Results   Component Value Date    CHOL 176 07/14/2017    TRIG 151 07/14/2017    HDL 33 (L) 07/14/2017     07/14/2017     Lab Results   Component Value Date    HGBA1C 5.9 (H) 05/02/2018     Lab Results   Component Value Date    TSH 1.120 05/02/2018      Ref. Range 7/14/2017 13:50 5/2/2018 15:42   proBNP Latest Ref Range: 0.0 - 900.0 pg/mL 229.0 2,620.0 (H)       The following portions of the patient's history were reviewed and updated as appropriate: allergies, current medications, past family history, past medical history, past social history, past surgical history and problem list.  Old records reviewed and pertinent information is included in the above objective data.      Assessment/Plan:   18lbs weight loss with Lasix 40mg SQ x2 and BID dosing since last Wednesday. Orthopnea improved and he is finally sleeping well.    He has echo scheduled for 5/15/18. Continue BID dosing of Lasix.   He has drastically changed the way he is eating and the amount of liquids he is taking in. He has not added salt, ate out, or had bread since last Friday. He and his wife appear committed to a low sodium diet.      Diagnosis Plan   1. Acute on chronic systolic CHF (congestive heart failure)     Reiterated all educational points this visit.  Continue daily weight monitoring.  Continue restricted dietary sodium intake.  Discussed patient action plan for heart failure. Contact information for this office verbalized.  Recommended avoiding NSAIDs use.  Discussed warning signs requiring additional medical attention for heart failure.   Education points repeated back by patient.           20 minutes out of 20 minutes face to face spent in counseling/coordination of care as relates to presentation of CHF with  dietary and lifestyle modifications/procedures as outlined above.     NICM, HFrEF improved  EF:50% from 15%. NYHA Class III, Stage C. Patient is euvolemic and in a well perfused physiologic state. Hemodynamics are acceptable  BETA-BLOCKER: Coreg 25mg BID          ACE/ARB: Lisinopril 40mg  ENTRESTO: not indicated  DIURETIC: Lasix 40mg BID  ALDOSTERONT ANTAGONIST: 25mg daily  IMDUR/HYDRALAZINE: N/A  DIGOXIN: Added 5/2/18- 125mcg  Fluid restriction: 2 L  Sodium restriction:2 grams  6MWT: yes As outpatient  Cardiac Rehab: not indicated  ICD: EF improved  ADHF: yes, 5/2/18  LVAD: not indicated    Lasix injection:  40mg SQ 5/2/18  40mg SQ 5/4/18    CMP today.     2. SOB (shortness of breath) & orthopnea Improved with Lasix. He is still SOA with exertion.          Follow up in 1 month to evaluate if Lasix can be reduced and overall compliance with regimen.   6 months with Dr. Nguyen

## 2018-06-05 ENCOUNTER — OFFICE VISIT (OUTPATIENT)
Dept: CARDIOLOGY | Facility: CLINIC | Age: 53
End: 2018-06-05

## 2018-06-05 VITALS
HEIGHT: 72 IN | SYSTOLIC BLOOD PRESSURE: 118 MMHG | HEART RATE: 62 BPM | WEIGHT: 310.2 LBS | OXYGEN SATURATION: 98 % | DIASTOLIC BLOOD PRESSURE: 70 MMHG | BODY MASS INDEX: 42.01 KG/M2

## 2018-06-05 DIAGNOSIS — I42.0 DILATED CARDIOMYOPATHY (HCC): ICD-10-CM

## 2018-06-05 DIAGNOSIS — E66.01 MORBID OBESITY (HCC): ICD-10-CM

## 2018-06-05 DIAGNOSIS — I50.23 ACUTE ON CHRONIC SYSTOLIC CHF (CONGESTIVE HEART FAILURE) (HCC): Primary | ICD-10-CM

## 2018-06-05 DIAGNOSIS — E11.9 TYPE 2 DIABETES MELLITUS WITHOUT COMPLICATION, WITHOUT LONG-TERM CURRENT USE OF INSULIN (HCC): ICD-10-CM

## 2018-06-05 DIAGNOSIS — I10 ESSENTIAL HYPERTENSION: ICD-10-CM

## 2018-06-05 PROCEDURE — 99215 OFFICE O/P EST HI 40 MIN: CPT | Performed by: NURSE PRACTITIONER

## 2018-06-05 PROCEDURE — 94618 PULMONARY STRESS TESTING: CPT | Performed by: NURSE PRACTITIONER

## 2018-06-05 RX ORDER — FUROSEMIDE 40 MG/1
40 TABLET ORAL DAILY
Qty: 60 TABLET | Refills: 11 | Status: SHIPPED | OUTPATIENT
Start: 2018-06-05 | End: 2019-07-30 | Stop reason: SDUPTHER

## 2018-06-05 NOTE — PATIENT INSTRUCTIONS
Recommended daily weight monitoring.  Discussed patient action plan for heart failure.  Recommended avoiding NSAIDs use.  Discussed warning signs requiring additional medical attention for heart failure.    Fluid restrictions: 1.5 L daily  Dietary sodium restrictions: 2000 mg daily    Decrease Lasix to 40 mg daily    OTC: Allegra 180 mg daily

## 2018-06-05 NOTE — PROGRESS NOTES
Subjective:     Chief Complaint   Patient presents with   • Congestive Heart Failure     chief complaint      Congestive Heart Failure   Presents for follow-up visit. The disease course has been worsening. Associated symptoms include fatigue and shortness of breath. Pertinent negatives include no abdominal pain, chest pain, claudication, edema, orthopnea, palpitations, paroxysmal nocturnal dyspnea or unexpected weight change. The symptoms have been improving. Past treatments include ACE inhibitors, aldosterone receptor blockers, beta blockers, digoxin and salt and fluid restriction. The treatment provided moderate relief. Compliance with prior treatments has been good. His past medical history is significant for DM and HTN. There is no history of CAD, chronic lung disease, hyperthyroidism or valvular heart disease. Compliance with total regimen is 51-75%. Compliance problems include adherence to exercise.  Compliance with diet is %. Compliance with exercise is 51-75%. Compliance with medications is %.       The patient is a 53-year-old gentleman with history of nonischemic cardiomyopathy, undergoing cardiac catheterization in 2011.  The patient has been on optimal medical therapy since that time.  In approximately December 2016, transthoracic echocardiogram demonstrated improvement in LVEF to 50%.  Dr. Sharma subsequently decreased Lasix to 40 mg daily.    On 05/02/2018, the patient presented for office evaluation with Dr. Sharma at which time he was found to have clinical evidence of hypervolemia thus receiving in office subcutaneous Lasix and ejection.   In addition, diuretic therapy was increased to Lasix 40 mg twice a day.    On 05/04/2018, the patient presented for evaluation with ANNA MARIE Post, thus receiving additional 40 mg subcutaneous Lasix, along with continuation of Lasix 40 mg twice a day oral.    On 05/09/2018 the patient presented back for reevaluation with ANNA MARIE Post at which  time he presented euvolemic.  Lasix 40 mg twice daily was continued with plans for follow-up today.    06/05/2018  The patient is reporting improvement in his overall dietary choices (compliance with dietary sodium restriction); compliance with fluid restrictions of 1 L per day; compliance with medications as prescribed.    The patient reports that he is having issues with seasonal allergies for which he has been placed on: Flonase, guaifenesin and cough suppression.    Review of Systems   Constitution: Positive for fatigue and malaise/fatigue. Negative for chills, decreased appetite, fever, weakness and unexpected weight change. Weight loss: The patient has lost a total of 25 pounds since 05/02/2018.   HENT: Positive for congestion. Negative for sore throat.    Eyes: Negative.    Cardiovascular: Positive for dyspnea on exertion. Negative for chest pain, claudication, irregular heartbeat, leg swelling, orthopnea, palpitations and paroxysmal nocturnal dyspnea.   Respiratory: Positive for shortness of breath. Negative for cough and wheezing.    Endocrine: Negative.  Negative for polydipsia, polyphagia and polyuria.   Hematologic/Lymphatic: Negative for bleeding problem. Does not bruise/bleed easily.   Skin: Negative for dry skin, flushing and rash.   Musculoskeletal: Negative for falls and myalgias.   Gastrointestinal: Negative for bloating, abdominal pain, change in bowel habit and melena.   Genitourinary: Negative for frequency and hematuria.   Neurological: Negative for dizziness, light-headedness and loss of balance.   Psychiatric/Behavioral: Negative for altered mental status and memory loss. The patient is not nervous/anxious.      Current Outpatient Prescriptions   Medication Sig Dispense Refill   • albuterol (PROAIR HFA) 108 (90 Base) MCG/ACT inhaler Inhale 2 puffs Every 4 (Four) Hours As Needed for Wheezing.     • carvedilol (COREG) 25 MG tablet Take 1 tablet by mouth 2 (Two) Times a Day With Meals. 180  "tablet 6   • digoxin (LANOXIN) 125 MCG tablet Take 1 tablet by mouth Daily. 30 tablet 11   • FARXIGA 5 MG tablet tablet Take 1 tablet by mouth Daily. 30 tablet 8   • furosemide (LASIX) 40 MG tablet Take 1 tablet by mouth Daily. 60 tablet 11   • lisinopril (PRINIVIL,ZESTRIL) 40 MG tablet Take 1 tablet by mouth Daily. 90 tablet 6   • spironolactone (ALDACTONE) 25 MG tablet Take 1 tablet by mouth Daily. 90 tablet 3   • promethazine-codeine (PHENERGAN with CODEINE) 6.25-10 MG/5ML syrup   0     No current facility-administered medications for this visit.         Objective:     Vitals:    06/05/18 1537   BP: 118/70   BP Location: Left arm   Patient Position: Sitting   Cuff Size: Adult   Pulse: 62   SpO2: 98%   Weight: (!) 141 kg (310 lb 3.2 oz)   Height: 182.9 cm (72\")     Wt Readings from Last 3 Encounters:   06/05/18 (!) 141 kg (310 lb 3.2 oz)   05/09/18 (!) 145 kg (318 lb 11.2 oz)   05/04/18 (!) 147 kg (324 lb)     Physical Exam   Constitutional: He is oriented to person, place, and time. He appears well-developed and well-nourished. No distress.   HENT:   Head: Normocephalic and atraumatic.   Neck: No JVD (6 cm @ 45 degrees) present.   Cardiovascular: Normal rate, regular rhythm, S1 normal, S2 normal, normal heart sounds and intact distal pulses.    No murmur heard.  Pulmonary/Chest: Effort normal and breath sounds normal. No stridor. No respiratory distress. He has no wheezes. He has no rales.   Abdominal: Soft. Bowel sounds are normal. He exhibits no distension (exogenous obesity). There is no tenderness.   Musculoskeletal: Normal range of motion. He exhibits no edema or tenderness.   Neurological: He is alert and oriented to person, place, and time.   Skin: Skin is warm and dry. No erythema.   Psychiatric: He has a normal mood and affect. His behavior is normal. Judgment and thought content normal.     Data Reviewed:     Electrocardiogram: 05/02/2018      Transthoracic echocardiogram: 12/02/2016      Transthoracic " echocardiogram: 09/29/2016      Lab Results   Component Value Date    GLUCOSE 89 05/09/2018    BUN 20 05/09/2018    CREATININE 1.33 (H) 05/09/2018    EGFRIFAFRI 68 05/09/2018    BCR 15.0 05/09/2018    K 4.1 05/09/2018    CO2 24.0 05/09/2018    CALCIUM 9.4 05/09/2018    ALBUMIN 4.30 05/09/2018    LABIL2 1.3 05/09/2018    AST 30 05/09/2018    ALT 39 05/09/2018     Lab Results   Component Value Date    WBC 6.75 05/02/2018    HGB 14.4 05/02/2018    HCT 43.4 05/02/2018    MCV 91.2 05/02/2018     05/02/2018     Lab Results   Component Value Date    CHOL 176 07/14/2017    TRIG 151 07/14/2017    HDL 33 (L) 07/14/2017     07/14/2017     Lab Results   Component Value Date    HGBA1C 5.9 (H) 05/02/2018     Lab Results   Component Value Date    TSH 1.120 05/02/2018      Ref. Range 7/14/2017 13:50 5/2/2018 15:42   proBNP Latest Ref Range: 0.0 - 900.0 pg/mL 229.0 2,620.0 (H)     The following portions of the patient's history were reviewed and updated as appropriate: allergies, current medications, past family history, past medical history, past social history, past surgical history and problem list.  Old records reviewed and pertinent information is included in the above objective data.      Assessment/Plan:      Diagnosis Plan   1. Acute on chronic systolic CHF (congestive heart failure) without clinical evidence of hypervolemia; he is well perfused AHA Stage C ; NYHA Class II  BETA-BLOCKER: Carvedilol 25 mg twice daily  ACE/ARB: Lisinopril 40 mg daily  ENTRESTO: N/A  DIURETIC: Decreased Lasix to 40 mg daily  ALDOSTERONT ANTAGONIST: Spironolactone 25 mg daily  IMDUR/HYDRALAZINE: N/A  DIGOXIN: 125 µg daily (05/02/2018)  Fluid restriction: 1.5 L daily  Sodium restriction: 2000 mg daily   6MWT: 06/05/2018   Cardiac Rehab: N/A  Pulmonary Rehab: N/A  ICD: N/A  CardioMEMS: N/A  Advanced HF evaluation (Transplant/LVAD): N/A@this time    Recommended daily weight monitoring.  Discussed patient action plan for heart  failure.  Recommended avoiding NSAIDs use.  Discussed warning signs requiring additional medical attention for heart failure.    Activity: Approximately 150 minutes of moderate activity (such as walking program)  per week (20-30 minutes most days of the week)  Diet: Heart healthy foods - more fresh fruits and vegetables and whole grains; less red meat; more fish and poultry that is baked or grilled - not fried; less salt not to exceed 2000 mg daily - less processed food; No trans or saturated fat  Non Smoker  Diabetes management  Blood pressure management  Weight management: Patient's Body mass index is 42.07 kg/m². BMI is above normal parameters. Recommendations include: exercise counseling and nutrition counseling.  Stress management    20 minutes out of 20 minutes face to face spent in counseling/coordination of care as relates to presentation of CHF with dietary and lifestyle modifications/procedures as outlined above.         2. Dilated cardiomyopathy, stable  As per #1 ;  Repeat transthoracic echocardiogram scheduled for 06/18/2018 (the patient is encouraged to keep this appointment)        3. Essential hypertension, Controlled  As per #1        4. Type 2 diabetes mellitus without complication, without long-term current use of insulin  Continue Farxiga; along with lifestyle modifications as outlined in #1        5. Morbid obesity  Lifestyle modifications as outlined in #1 ;  Discussion regarding symptoms or prior evaluation for MAX in follow-up

## 2018-06-05 NOTE — PROGRESS NOTES
Collin Duffy Malick  Procedure: 6 Minute Walk Test   06/05/2018  Indication:HFrEF, improved    Pretest: BP:118/70               HR:62               Sa02:98               Dyspnea:0               Fatigue:5    Post Test: BP:122/80               HR:87               Sa02:98               Dyspnea:0               Fatigue:7    First 6MWT:yes    Supplemental oxygen during test:no    Stopped before 6 minutes:no    Pauses:0    Results in distance walked:348.92    Did individual experience any pain or discomfort:no    I was present for the above test and agree with the data.     NYHA Functional Class II          This document has been electronically signed by Ge Castro MA on June 5, 2018 3:50 PM

## 2018-06-18 LAB
BH CV ECHO MEAS - ACS: 2.3 CM
BH CV ECHO MEAS - AO MAX PG (FULL): 3.7 MMHG
BH CV ECHO MEAS - AO MAX PG: 6.9 MMHG
BH CV ECHO MEAS - AO MEAN PG (FULL): 2 MMHG
BH CV ECHO MEAS - AO MEAN PG: 4 MMHG
BH CV ECHO MEAS - AO ROOT AREA (BSA CORRECTED): 1.3
BH CV ECHO MEAS - AO ROOT AREA: 8.6 CM^2
BH CV ECHO MEAS - AO ROOT DIAM: 3.3 CM
BH CV ECHO MEAS - AO V2 MAX: 131 CM/SEC
BH CV ECHO MEAS - AO V2 MEAN: 91.5 CM/SEC
BH CV ECHO MEAS - AO V2 VTI: 30.3 CM
BH CV ECHO MEAS - ASC AORTA: 3.7 CM
BH CV ECHO MEAS - AVA(I,A): 3 CM^2
BH CV ECHO MEAS - AVA(I,D): 3 CM^2
BH CV ECHO MEAS - AVA(V,A): 3.4 CM^2
BH CV ECHO MEAS - AVA(V,D): 3.4 CM^2
BH CV ECHO MEAS - BSA(HAYCOCK): 2.7 M^2
BH CV ECHO MEAS - BSA: 2.6 M^2
BH CV ECHO MEAS - BZI_BMI: 42 KILOGRAMS/M^2
BH CV ECHO MEAS - BZI_METRIC_HEIGHT: 182.9 CM
BH CV ECHO MEAS - BZI_METRIC_WEIGHT: 140.6 KG
BH CV ECHO MEAS - CONTRAST EF 4CH: 33.6 ML/M^2
BH CV ECHO MEAS - EDV(CUBED): 221.4 ML
BH CV ECHO MEAS - EDV(MOD-SP4): 220 ML
BH CV ECHO MEAS - EDV(TEICH): 183.4 ML
BH CV ECHO MEAS - EF(CUBED): 53.1 %
BH CV ECHO MEAS - EF(MOD-SP4): 33.6 %
BH CV ECHO MEAS - EF(TEICH): 44.2 %
BH CV ECHO MEAS - EPSS: 1.1 CM
BH CV ECHO MEAS - ESV(CUBED): 103.8 ML
BH CV ECHO MEAS - ESV(MOD-SP4): 146 ML
BH CV ECHO MEAS - ESV(TEICH): 102.4 ML
BH CV ECHO MEAS - FS: 22.3 %
BH CV ECHO MEAS - IVS/LVPW: 0.97
BH CV ECHO MEAS - IVSD: 1.4 CM
BH CV ECHO MEAS - LA DIMENSION: 4.4 CM
BH CV ECHO MEAS - LA/AO: 1.3
BH CV ECHO MEAS - LV DIASTOLIC VOL/BSA (35-75): 85.7 ML/M^2
BH CV ECHO MEAS - LV MASS(C)D: 402.9 GRAMS
BH CV ECHO MEAS - LV MASS(C)DI: 157 GRAMS/M^2
BH CV ECHO MEAS - LV MAX PG: 3.2 MMHG
BH CV ECHO MEAS - LV MEAN PG: 2 MMHG
BH CV ECHO MEAS - LV SYSTOLIC VOL/BSA (12-30): 56.9 ML/M^2
BH CV ECHO MEAS - LV V1 MAX: 89.5 CM/SEC
BH CV ECHO MEAS - LV V1 MEAN: 58.6 CM/SEC
BH CV ECHO MEAS - LV V1 VTI: 18.7 CM
BH CV ECHO MEAS - LVIDD: 6.1 CM
BH CV ECHO MEAS - LVIDS: 4.7 CM
BH CV ECHO MEAS - LVLD AP4: 8.6 CM
BH CV ECHO MEAS - LVLS AP4: 7.9 CM
BH CV ECHO MEAS - LVOT AREA (M): 4.9 CM^2
BH CV ECHO MEAS - LVOT AREA: 4.9 CM^2
BH CV ECHO MEAS - LVOT DIAM: 2.5 CM
BH CV ECHO MEAS - LVPWD: 1.5 CM
BH CV ECHO MEAS - MR MAX PG: 32.5 MMHG
BH CV ECHO MEAS - MR MAX VEL: 285 CM/SEC
BH CV ECHO MEAS - MV A MAX VEL: 72.6 CM/SEC
BH CV ECHO MEAS - MV E MAX VEL: 61.2 CM/SEC
BH CV ECHO MEAS - MV E/A: 0.84
BH CV ECHO MEAS - PA MAX PG: 5.9 MMHG
BH CV ECHO MEAS - PA MEAN PG: 3 MMHG
BH CV ECHO MEAS - PA V2 MAX: 121 CM/SEC
BH CV ECHO MEAS - PA V2 MEAN: 86.2 CM/SEC
BH CV ECHO MEAS - PA V2 VTI: 31 CM
BH CV ECHO MEAS - PI END-D VEL: 121 CM/SEC
BH CV ECHO MEAS - RAP SYSTOLE: 5 MMHG
BH CV ECHO MEAS - RVDD: 3 CM
BH CV ECHO MEAS - RVSP: 26.5 MMHG
BH CV ECHO MEAS - SI(AO): 101 ML/M^2
BH CV ECHO MEAS - SI(CUBED): 45.8 ML/M^2
BH CV ECHO MEAS - SI(LVOT): 35.8 ML/M^2
BH CV ECHO MEAS - SI(MOD-SP4): 28.8 ML/M^2
BH CV ECHO MEAS - SI(TEICH): 31.6 ML/M^2
BH CV ECHO MEAS - SV(AO): 259.2 ML
BH CV ECHO MEAS - SV(CUBED): 117.6 ML
BH CV ECHO MEAS - SV(LVOT): 91.8 ML
BH CV ECHO MEAS - SV(MOD-SP4): 74 ML
BH CV ECHO MEAS - SV(TEICH): 81.1 ML
BH CV ECHO MEAS - TR MAX VEL: 232 CM/SEC
LV EF 2D ECHO EST: 33 %

## 2018-07-05 ENCOUNTER — OFFICE VISIT (OUTPATIENT)
Dept: CARDIOLOGY | Facility: CLINIC | Age: 53
End: 2018-07-05

## 2018-07-05 VITALS
OXYGEN SATURATION: 97 % | DIASTOLIC BLOOD PRESSURE: 66 MMHG | WEIGHT: 315 LBS | BODY MASS INDEX: 42.66 KG/M2 | SYSTOLIC BLOOD PRESSURE: 106 MMHG | HEART RATE: 65 BPM | HEIGHT: 72 IN

## 2018-07-05 DIAGNOSIS — R06.83 SNORING: ICD-10-CM

## 2018-07-05 DIAGNOSIS — I50.22 CHRONIC SYSTOLIC CHF (CONGESTIVE HEART FAILURE) (HCC): Primary | ICD-10-CM

## 2018-07-05 DIAGNOSIS — I10 ESSENTIAL HYPERTENSION: ICD-10-CM

## 2018-07-05 DIAGNOSIS — G47.9 SLEEP DISTURBANCES: ICD-10-CM

## 2018-07-05 DIAGNOSIS — I42.0 DILATED CARDIOMYOPATHY (HCC): ICD-10-CM

## 2018-07-05 DIAGNOSIS — IMO0001 CLASS 3 OBESITY DUE TO EXCESS CALORIES WITH SERIOUS COMORBIDITY AND BODY MASS INDEX (BMI) OF 40.0 TO 44.9 IN ADULT: ICD-10-CM

## 2018-07-05 PROCEDURE — 99215 OFFICE O/P EST HI 40 MIN: CPT | Performed by: NURSE PRACTITIONER

## 2018-07-05 NOTE — PROGRESS NOTES
Subjective:     Chief Complaint   Patient presents with   • Congestive Heart Failure     chief complaint     Congestive Heart Failure   Presents for follow-up visit. The disease course has been worsening. Pertinent negatives include no abdominal pain, chest pain, claudication, edema, fatigue, orthopnea, palpitations, paroxysmal nocturnal dyspnea, shortness of breath or unexpected weight change. The symptoms have been improving. Past treatments include ACE inhibitors, aldosterone receptor blockers, beta blockers, digoxin and salt and fluid restriction. The treatment provided moderate relief. Compliance with prior treatments has been good. His past medical history is significant for DM and HTN. There is no history of CAD, chronic lung disease, hyperthyroidism or valvular heart disease. Compliance with total regimen is 51-75%. Compliance problems include adherence to exercise.  Compliance with diet is %. Compliance with exercise is 51-75%. Compliance with medications is %.     The patient is a 53-year-old gentleman with history of nonischemic cardiomyopathy, undergoing cardiac catheterization in 2011.  The patient has been on optimal medical therapy since that time.  In approximately December 2016, transthoracic echocardiogram demonstrated improvement in LVEF to 50%.  Dr. Sharma subsequently decreased Lasix to 40 mg daily.    On 05/02/2018, the patient presented for office evaluation with Dr. Sharma at which time he was found to have clinical evidence of hypervolemia thus receiving in office subcutaneous Lasix and ejection.   In addition, diuretic therapy was increased to Lasix 40 mg twice a day.    On 05/04/2018, the patient presented for evaluation with ANNA MARIE Post, thus receiving additional 40 mg subcutaneous Lasix, along with continuation of Lasix 40 mg twice a day oral.    On 05/09/2018 the patient presented back for reevaluation with ANNA MARIE Post at which time he presented euvolemic.   "Lasix 40 mg twice daily was continued with plans for follow-up today.    06/05/2018  The patient is reporting improvement in his overall dietary choices (compliance with dietary sodium restriction); compliance with fluid restrictions of 1 L per day; compliance with medications as prescribed.    Review of Systems   Constitution: Positive for weight gain (The patient reports that he has been on vacation the past two weeks and he and his wife have been \"eating well\".). Negative for chills, decreased appetite, fatigue, fever, weakness and unexpected weight change.   HENT: Negative for congestion and sore throat.    Eyes: Negative.    Cardiovascular: Positive for dyspnea on exertion. Negative for chest pain, claudication, irregular heartbeat, leg swelling, orthopnea, palpitations and paroxysmal nocturnal dyspnea.   Respiratory: Negative for cough, shortness of breath and wheezing.    Endocrine: Negative.  Negative for polydipsia, polyphagia and polyuria.   Hematologic/Lymphatic: Negative for bleeding problem. Does not bruise/bleed easily.   Skin: Negative for dry skin, flushing and rash.   Musculoskeletal: Negative for falls and myalgias.   Gastrointestinal: Negative for bloating, abdominal pain, change in bowel habit and melena.   Genitourinary: Negative for frequency and hematuria.   Neurological: Negative for dizziness, light-headedness and loss of balance.   Psychiatric/Behavioral: Negative for altered mental status and memory loss. The patient is not nervous/anxious.      Current Outpatient Prescriptions   Medication Sig Dispense Refill   • albuterol (PROAIR HFA) 108 (90 Base) MCG/ACT inhaler Inhale 2 puffs Every 4 (Four) Hours As Needed for Wheezing.     • carvedilol (COREG) 25 MG tablet Take 1 tablet by mouth 2 (Two) Times a Day With Meals. 180 tablet 6   • digoxin (LANOXIN) 125 MCG tablet Take 1 tablet by mouth Daily. 30 tablet 11   • FARXIGA 5 MG tablet tablet Take 1 tablet by mouth Daily. 30 tablet 8   • " "furosemide (LASIX) 40 MG tablet Take 1 tablet by mouth Daily. 60 tablet 11   • lisinopril (PRINIVIL,ZESTRIL) 40 MG tablet Take 1 tablet by mouth Daily. 90 tablet 6   • promethazine-codeine (PHENERGAN with CODEINE) 6.25-10 MG/5ML syrup   0   • spironolactone (ALDACTONE) 25 MG tablet Take 1 tablet by mouth Daily. 90 tablet 3     No current facility-administered medications for this visit.      Objective:     Vitals:    07/05/18 1603   BP: 106/66   BP Location: Left arm   Patient Position: Sitting   Cuff Size: Adult   Pulse: 65   SpO2: 97%   Weight: (!) 143 kg (315 lb 6.4 oz)   Height: 182.9 cm (72\")     Wt Readings from Last 3 Encounters:   07/05/18 (!) 143 kg (315 lb 6.4 oz)   06/05/18 (!) 141 kg (310 lb 3.2 oz)   05/09/18 (!) 145 kg (318 lb 11.2 oz)     Physical Exam   Constitutional: He is oriented to person, place, and time. He appears well-developed and well-nourished. No distress.   HENT:   Head: Normocephalic and atraumatic.   Neck: No JVD (6 cm @ 45 degrees) present.   Cardiovascular: Normal rate, regular rhythm, S1 normal, S2 normal, normal heart sounds and intact distal pulses.    No murmur heard.  Pulmonary/Chest: Effort normal and breath sounds normal. No stridor. No respiratory distress. He has no wheezes. He has no rales.   Abdominal: Soft. Bowel sounds are normal. He exhibits no distension (exogenous obesity). There is no tenderness.   Musculoskeletal: Normal range of motion. He exhibits no edema or tenderness.   Neurological: He is alert and oriented to person, place, and time.   Skin: Skin is warm and dry. No erythema.   Psychiatric: He has a normal mood and affect. His behavior is normal. Judgment and thought content normal.     Data Reviewed:     Electrocardiogram: 05/02/2018      Transthoracic echocardiogram: 06/18/2018  · The left ventricular cavity is moderately dilated.  · Left ventricular wall thickness is consistent with moderate concentric hypertrophy.  · Estimated EF = 33%. Global " hypokinesis of the left ventricle  · Left ventricular diastolic dysfunction (grade I) consistent with impaired relaxation.  · Right ventricular cavity is mildly dilated.  · Left atrial cavity size is moderately dilated.  · Mild mitral valve regurgitation is present  · Mild-to-moderate pulmonic valve regurgitation is present.    Transthoracic echocardiogram: 12/02/2016      Transthoracic echocardiogram: 09/29/2016      Left heart catheterization: 07/17/2011  HEMODYNAMICS: Aortic pressure 152/81 Mean 110 Left ventricular pressure 152/18   LEFT VENTRICULOGRAM: Shows mild left ventricular enlargement with overall preserved left ventricular systolic function with estimated ejection fraction of about 55%.   CORONARY ARTERIES: Right dominant system   LEFT MAIN: Free of any disease.   LAD: Medium caliber vessel which in the proximal portion had evidence of luminal irregularity with good MARQUISE 3 flow. The diagonal branch had minimal plaquing with evidence of good MARQUISE 3 flow.   CX: Medium caliber vessel which gave out a medium caliber ramus intermedius branch which was free of any obstructive stenosis. The circumflex artery after the origin of the ramus intermedius branch was free of any of obstructive stenosis with good MARQUISE 3 flow.   RCA: Medium caliber dominant vessel which was free of any obstructive stenosis with good MARQUISE 3 flow.   FINAL DX:   1. No evidence of any obstructive epicardial coronary artery disease.   2. Preserved left ventricular systolic function with estimated ejection fraction of about 55%.   LEONID GREER M.D.       Lab Results   Component Value Date    GLUCOSE 89 05/09/2018    BUN 20 05/09/2018    CREATININE 1.33 (H) 05/09/2018    EGFRIFAFRI 68 05/09/2018    BCR 15.0 05/09/2018    K 4.1 05/09/2018    CO2 24.0 05/09/2018    CALCIUM 9.4 05/09/2018    ALBUMIN 4.30 05/09/2018    LABIL2 1.3 05/09/2018    AST 30 05/09/2018    ALT 39 05/09/2018     Lab Results   Component Value Date    WBC 6.75  05/02/2018    HGB 14.4 05/02/2018    HCT 43.4 05/02/2018    MCV 91.2 05/02/2018     05/02/2018     Lab Results   Component Value Date    CHOL 176 07/14/2017    TRIG 151 07/14/2017    HDL 33 (L) 07/14/2017     07/14/2017     Lab Results   Component Value Date    HGBA1C 5.9 (H) 05/02/2018     Lab Results   Component Value Date    TSH 1.120 05/02/2018      Ref. Range 7/14/2017 13:50 5/2/2018 15:42   proBNP Latest Ref Range: 0.0 - 900.0 pg/mL 229.0 2,620.0 (H)     The following portions of the patient's history were reviewed and updated as appropriate: allergies, current medications, past family history, past medical history, past social history, past surgical history and problem list.  Old records reviewed and pertinent information is included in the above objective data.      Assessment/Plan:      Diagnosis Plan   1. Chronic systolic CHF (congestive heart failure) without clinical evidence of hypervolemia; he is well perfused AHA Stage C ; NYHA Class II  BETA-BLOCKER: Carvedilol 25 mg twice daily  ACE/ARB: Lisinopril 40 mg daily  ENTRESTO: N/A  DIURETIC: Lasix to 40 mg daily  ALDOSTERONT ANTAGONIST: Spironolactone 25 mg daily  IMDUR/HYDRALAZINE: N/A  DIGOXIN: 125 µg daily (05/02/2018)  Fluid restriction: 1.5 L daily  Sodium restriction: 2000 mg daily   6MWT: 06/05/2018   Cardiac Rehab: N/A  Pulmonary Rehab: N/A  ICD: N/A  CardioMEMS: N/A  Advanced HF evaluation (Transplant/LVAD): N/A@this time    Recommended daily weight monitoring.  Discussed patient action plan for heart failure.  Recommended avoiding NSAIDs use.  Discussed warning signs requiring additional medical attention for heart failure.    Activity: Approximately 150 minutes of moderate activity (such as walking program)  per week (20-30 minutes most days of the week)  Diet: Heart healthy foods - more fresh fruits and vegetables and whole grains; less red meat; more fish and poultry that is baked or grilled - not fried; less salt not to exceed  2000 mg daily - less processed food; No trans or saturated fat  Non Smoker  Diabetes management  Blood pressure management  Weight management: Patient's Body mass index is 42.78 kg/m². BMI is above normal parameters. Recommendations include: exercise counseling and nutrition counseling.  Stress management       2. Dilated cardiomyopathy, stable  As per #1 ;       3. Essential hypertension, Controlled  As per #1        4. Type 2 diabetes mellitus without complication, without long-term current use of insulin  Continue Farxiga; along with lifestyle modifications as outlined in #1        5. Morbid obesity  Lifestyle modifications as outlined in #1 ;  Discussion regarding symptoms or prior evaluation for MAX in follow-up                This document has been electronically signed by ANNA MARIE Monreal on July 10, 2018 10:55 AM

## 2018-07-24 RX ORDER — SPIRONOLACTONE 25 MG/1
TABLET ORAL
Qty: 90 TABLET | Refills: 3 | Status: SHIPPED | OUTPATIENT
Start: 2018-07-24 | End: 2019-07-29 | Stop reason: SDUPTHER

## 2019-06-10 NOTE — PROGRESS NOTES
Subjective:     Chief Complaint   Patient presents with   • Congestive Heart Failure     chief complaint     Congestive Heart Failure   Presents for follow-up visit. The disease course has been worsening. Pertinent negatives include no abdominal pain, chest pain, claudication, edema, fatigue, orthopnea, palpitations, paroxysmal nocturnal dyspnea, shortness of breath or unexpected weight change. The symptoms have been improving. Past treatments include ACE inhibitors, aldosterone receptor blockers, beta blockers, digoxin and salt and fluid restriction. The treatment provided moderate relief. Compliance with prior treatments has been good. His past medical history is significant for DM and HTN. There is no history of CAD, chronic lung disease, hyperthyroidism or valvular heart disease. Compliance with total regimen is 51-75%. Compliance problems include adherence to exercise.  Compliance with diet is %. Compliance with exercise is 51-75%. Compliance with medications is %.     The patient is a 53-year-old gentleman with history of nonischemic cardiomyopathy, undergoing cardiac catheterization in 2011.  The patient has been on optimal medical therapy since that time.  In approximately December 2016, transthoracic echocardiogram demonstrated improvement in LVEF to 50%.  Dr. Sharma subsequently decreased Lasix to 40 mg daily.    On 05/02/2018, the patient presented for office evaluation with Dr. Sharma at which time he was found to have clinical evidence of hypervolemia thus receiving in office subcutaneous Lasix and ejection.   In addition, diuretic therapy was increased to Lasix 40 mg twice a day.    On 05/04/2018, the patient presented for evaluation with ANNA MARIE Post, thus receiving additional 40 mg subcutaneous Lasix, along with continuation of Lasix 40 mg twice a day oral.    On 05/09/2018 the patient presented back for reevaluation with ANNA MARIE Post at which time he presented euvolemic.   Lasix 40 mg twice daily was continued with plans for follow-up today.    06/05/2018  The patient is reporting improvement in his overall dietary choices (compliance with dietary sodium restriction); compliance with fluid restrictions of 1 L per day; compliance with medications as prescribed.    6/10/19: 1 year followup. Has not bee seen since last year. Reports medications are affecting his sex life (decreased libido, decreased ability to achieve erection). Patient appears euvolemic and without symptoms of fluid overload including edema, SOA, fatigue.     Review of Systems   Constitution: Negative for chills, decreased appetite, fatigue, fever, weakness and unexpected weight change.   HENT: Negative for congestion and sore throat.    Eyes: Negative.    Cardiovascular: Negative for chest pain, claudication, dyspnea on exertion, irregular heartbeat, leg swelling, orthopnea, palpitations and paroxysmal nocturnal dyspnea.   Respiratory: Negative for cough, shortness of breath and wheezing.    Endocrine: Negative.  Negative for polydipsia, polyphagia and polyuria.   Hematologic/Lymphatic: Negative for bleeding problem. Does not bruise/bleed easily.   Skin: Negative for dry skin, flushing and rash.   Musculoskeletal: Negative for falls and myalgias.   Gastrointestinal: Negative for bloating, abdominal pain, change in bowel habit and melena.   Genitourinary: Negative for frequency and hematuria.   Neurological: Negative for dizziness, light-headedness and loss of balance.   Psychiatric/Behavioral: Negative for altered mental status and memory loss. The patient is not nervous/anxious.      Current Outpatient Medications   Medication Sig Dispense Refill   • carvedilol (COREG) 25 MG tablet Take 1 tablet by mouth 2 (Two) Times a Day With Meals. (Patient taking differently: Take 25 mg by mouth Daily.) 180 tablet 6   • digoxin (LANOXIN) 125 MCG tablet Take 1 tablet by mouth Daily. 30 tablet 11   • furosemide (LASIX) 40 MG tablet  "Take 1 tablet by mouth Daily. (Patient taking differently: Take 40 mg by mouth.) 60 tablet 11   • lisinopril (PRINIVIL,ZESTRIL) 40 MG tablet Take 1 tablet by mouth Daily. 90 tablet 6   • spironolactone (ALDACTONE) 25 MG tablet TAKE ONE TABLET BY MOUTH EVERY DAY 90 tablet 3   • albuterol (PROAIR HFA) 108 (90 Base) MCG/ACT inhaler Inhale 2 puffs Every 4 (Four) Hours As Needed for Wheezing.     • FARXIGA 5 MG tablet tablet Take 1 tablet by mouth Daily. 30 tablet 8   • promethazine-codeine (PHENERGAN with CODEINE) 6.25-10 MG/5ML syrup   0   • sildenafil (VIAGRA) 50 MG tablet Take 1 tablet by mouth Daily As Needed for erectile dysfunction. 30 tablet 11     No current facility-administered medications for this visit.      Objective:     Vitals:    06/11/19 1529   BP: 142/78   BP Location: Left arm   Patient Position: Sitting   Cuff Size: Adult   Pulse: 68   SpO2: 97%   Weight: (!) 145 kg (319 lb 6.4 oz)   Height: 182.9 cm (72\")     Wt Readings from Last 3 Encounters:   06/11/19 (!) 145 kg (319 lb 6.4 oz)   07/05/18 (!) 143 kg (315 lb 6.4 oz)   06/05/18 (!) 141 kg (310 lb 3.2 oz)     Physical Exam   Constitutional: He is oriented to person, place, and time. Vital signs are normal. He appears well-developed and well-nourished. No distress.   HENT:   Head: Normocephalic and atraumatic.   Neck: No hepatojugular reflux and no JVD present.   Cardiovascular: Normal rate, regular rhythm, S1 normal, S2 normal, normal heart sounds and intact distal pulses. Exam reveals no gallop, no S3 and no S4.   No murmur heard.  Pulmonary/Chest: Effort normal and breath sounds normal. No stridor. No respiratory distress. He has no wheezes. He has no rales.   Abdominal: Soft. Bowel sounds are normal. He exhibits no distension. There is no tenderness.   Musculoskeletal: Normal range of motion. He exhibits no edema or tenderness.   Neurological: He is alert and oriented to person, place, and time.   Skin: Skin is warm and dry. No erythema. "   Psychiatric: He has a normal mood and affect. His behavior is normal. Judgment and thought content normal.     Data Reviewed:     6/11/19: ECG          Transthoracic echocardogram: 06/18/2018  · The left ventricular cavity is moderately dilated.  · Left ventricular wall thickness is consistent with moderate concentric hypertrophy.  · Estimated EF = 33%. Global hypokinesis of the left ventricle  · Left ventricular diastolic dysfunction (grade I) consistent with impaired relaxation.  · Right ventricular cavity is mildly dilated.  · Left atrial cavity size is moderately dilated.  · Mild mitral valve regurgitation is present  · Mild-to-moderate pulmonic valve regurgitation is present.    Transthoracic echocardiogram: 12/02/2016      Transthoracic echocardiogram: 09/29/2016      Left heart catheterization: 07/17/2011  HEMODYNAMICS: Aortic pressure 152/81 Mean 110 Left ventricular pressure 152/18   LEFT VENTRICULOGRAM: Shows mild left ventricular enlargement with overall preserved left ventricular systolic function with estimated ejection fraction of about 55%.   CORONARY ARTERIES: Right dominant system   LEFT MAIN: Free of any disease.   LAD: Medium caliber vessel which in the proximal portion had evidence of luminal irregularity with good MARQUISE 3 flow. The diagonal branch had minimal plaquing with evidence of good MARQUISE 3 flow.   CX: Medium caliber vessel which gave out a medium caliber ramus intermedius branch which was free of any obstructive stenosis. The circumflex artery after the origin of the ramus intermedius branch was free of any of obstructive stenosis with good MARQUISE 3 flow.   RCA: Medium caliber dominant vessel which was free of any obstructive stenosis with good MARQUISE 3 flow.   FINAL DX:   1. No evidence of any obstructive epicardial coronary artery disease.   2. Preserved left ventricular systolic function with estimated ejection fraction of about 55%.   LEONID GREER M.D.       Lab Results   Component  Value Date    GLUCOSE 89 05/09/2018    BUN 20 05/09/2018    CREATININE 1.33 (H) 05/09/2018    EGFRIFAFRI 68 05/09/2018    BCR 15.0 05/09/2018    K 4.1 05/09/2018    CO2 24.0 05/09/2018    CALCIUM 9.4 05/09/2018    ALBUMIN 4.30 05/09/2018    AST 30 05/09/2018    ALT 39 05/09/2018     Lab Results   Component Value Date    WBC 6.75 05/02/2018    HGB 14.4 05/02/2018    HCT 43.4 05/02/2018    MCV 91.2 05/02/2018     05/02/2018     Lab Results   Component Value Date    CHOL 176 07/14/2017    TRIG 151 07/14/2017    HDL 33 (L) 07/14/2017     07/14/2017     Lab Results   Component Value Date    HGBA1C 5.9 (H) 05/02/2018     Lab Results   Component Value Date    TSH 1.120 05/02/2018      Ref. Range 7/14/2017 13:50 5/2/2018 15:42   proBNP Latest Ref Range: 0.0 - 900.0 pg/mL 229.0 2,620.0 (H)     The following portions of the patient's history were reviewed and updated as appropriate: allergies, current medications, past family history, past medical history, past social history, past surgical history and problem list.  Old records reviewed and pertinent information is included in the above objective data.      Assessment/Plan:     6/11/19:      Diagnosis Plan   1. Chronic systolic CHF (congestive heart failure) without clinical evidence of hypervolemia; he is well perfused AHA Stage C ; NYHA Class II  BETA-BLOCKER: Carvedilol 25 mg twice daily  ACE/ARB: Lisinopril 40 mg daily  ENTRESTO: N/A  DIURETIC: Lasix to 40 mg daily  ALDOSTERONT ANTAGONIST: Spironolactone 25mg daily  IMDUR/HYDRALAZINE: N/A  DIGOXIN: 125 µg daily (05/02/2018)  Fluid restriction: 1.5 L daily  Sodium restriction: 2000 mg daily   6MWT: 06/05/2018   Cardiac Rehab: N/A  Pulmonary Rehab: N/A  ICD: N/A  CardioMEMS: N/A  Advanced HF evaluation (Transplant/LVAD): N/A@this time    Recommended daily weight monitoring.  Discussed patient action plan for heart failure.  Recommended avoiding NSAIDs use.  Discussed warning signs requiring additional medical  attention for heart failure.    Activity: Approximately 150 minutes of moderate activity (such as walking program)  per week (20-30 minutes most days of the week)  Diet: Heart healthy foods - more fresh fruits and vegetables and whole grains; less red meat; more fish and poultry that is baked or grilled - not fried; less salt not to exceed 2000 mg daily - less processed food; No trans or saturated fat  Non Smoker  Diabetes management  Blood pressure management  Weight management: Patient's Body mass index is 43.32 kg/m². BMI is above normal parameters. Recommendations include: exercise counseling and nutrition counseling.  Stress management                 2. Type 2 diabetes mellitus without complication, without long-term current use of insulin  Continue Farxiga; along with lifestyle modifications as outlined in #1        3. Morbid obesity  Lifestyle modifications as outlined in #1 ;  Discussion regarding symptoms or prior evaluation for MAX in follow-up        4.  Erectile Dysfunction -Ordered Sidenafil 50mg PRN  -Spoke with patient about the cause potentially being due to BB or low EF. Patient told to follow up with PCP to have labs checked for possible low T.     Follow up: 6 Months        This document has been electronically signed by ANNA MARIE Lantigua on June 11, 2019 4:23 PM

## 2019-06-11 ENCOUNTER — OFFICE VISIT (OUTPATIENT)
Dept: CARDIOLOGY | Facility: CLINIC | Age: 54
End: 2019-06-11

## 2019-06-11 VITALS
WEIGHT: 315 LBS | HEIGHT: 72 IN | DIASTOLIC BLOOD PRESSURE: 78 MMHG | HEART RATE: 68 BPM | OXYGEN SATURATION: 97 % | SYSTOLIC BLOOD PRESSURE: 142 MMHG | BODY MASS INDEX: 42.66 KG/M2

## 2019-06-11 DIAGNOSIS — I42.0 DILATED CARDIOMYOPATHY (HCC): Primary | ICD-10-CM

## 2019-06-11 DIAGNOSIS — I50.22 CHRONIC SYSTOLIC CHF (CONGESTIVE HEART FAILURE) (HCC): ICD-10-CM

## 2019-06-11 DIAGNOSIS — E11.9 TYPE 2 DIABETES MELLITUS WITHOUT COMPLICATION, WITHOUT LONG-TERM CURRENT USE OF INSULIN (HCC): ICD-10-CM

## 2019-06-11 DIAGNOSIS — E66.01 MORBID OBESITY (HCC): ICD-10-CM

## 2019-06-11 DIAGNOSIS — N52.9 ERECTILE DYSFUNCTION, UNSPECIFIED ERECTILE DYSFUNCTION TYPE: ICD-10-CM

## 2019-06-11 PROCEDURE — 93000 ELECTROCARDIOGRAM COMPLETE: CPT | Performed by: INTERNAL MEDICINE

## 2019-06-11 PROCEDURE — 99214 OFFICE O/P EST MOD 30 MIN: CPT | Performed by: NURSE PRACTITIONER

## 2019-06-11 RX ORDER — SILDENAFIL 25 MG/1
50 TABLET, FILM COATED ORAL DAILY PRN
Qty: 30 TABLET | Refills: 11 | Status: SHIPPED | OUTPATIENT
Start: 2019-06-11 | End: 2019-06-11 | Stop reason: SDUPTHER

## 2019-06-11 RX ORDER — SILDENAFIL 50 MG/1
50 TABLET, FILM COATED ORAL DAILY PRN
Qty: 30 TABLET | Refills: 11 | Status: SHIPPED | OUTPATIENT
Start: 2019-06-11 | End: 2020-03-16 | Stop reason: ALTCHOICE

## 2019-07-29 RX ORDER — FUROSEMIDE 40 MG/1
TABLET ORAL
Qty: 180 TABLET | Refills: 3 | Status: SHIPPED | OUTPATIENT
Start: 2019-07-29 | End: 2019-07-30 | Stop reason: SDUPTHER

## 2019-07-29 RX ORDER — CARVEDILOL 25 MG/1
TABLET ORAL
Qty: 180 TABLET | Refills: 3 | Status: SHIPPED | OUTPATIENT
Start: 2019-07-29 | End: 2019-07-30 | Stop reason: SDUPTHER

## 2019-07-29 RX ORDER — DIGOXIN 125 UG/1
TABLET ORAL
Qty: 90 TABLET | Refills: 3 | Status: SHIPPED | OUTPATIENT
Start: 2019-07-29 | End: 2019-07-30 | Stop reason: SDUPTHER

## 2019-07-29 RX ORDER — SPIRONOLACTONE 25 MG/1
TABLET ORAL
Qty: 90 TABLET | Refills: 3 | Status: SHIPPED | OUTPATIENT
Start: 2019-07-29 | End: 2019-07-30 | Stop reason: SDUPTHER

## 2019-07-30 RX ORDER — FUROSEMIDE 40 MG/1
40 TABLET ORAL 2 TIMES DAILY
Qty: 180 TABLET | Refills: 3 | Status: SHIPPED | OUTPATIENT
Start: 2019-07-30 | End: 2020-03-16 | Stop reason: ALTCHOICE

## 2019-07-30 RX ORDER — CARVEDILOL 25 MG/1
25 TABLET ORAL 2 TIMES DAILY WITH MEALS
Qty: 180 TABLET | Refills: 3 | Status: SHIPPED | OUTPATIENT
Start: 2019-07-30 | End: 2021-01-18

## 2019-07-30 RX ORDER — DIGOXIN 125 MCG
125 TABLET ORAL DAILY
Qty: 90 TABLET | Refills: 3 | Status: SHIPPED | OUTPATIENT
Start: 2019-07-30 | End: 2020-03-16

## 2019-07-30 RX ORDER — SPIRONOLACTONE 25 MG/1
25 TABLET ORAL DAILY
Qty: 90 TABLET | Refills: 3 | Status: SHIPPED | OUTPATIENT
Start: 2019-07-30 | End: 2020-07-20

## 2020-03-16 ENCOUNTER — OFFICE VISIT (OUTPATIENT)
Dept: CARDIOLOGY | Facility: CLINIC | Age: 55
End: 2020-03-16

## 2020-03-16 VITALS
OXYGEN SATURATION: 99 % | DIASTOLIC BLOOD PRESSURE: 76 MMHG | SYSTOLIC BLOOD PRESSURE: 122 MMHG | BODY MASS INDEX: 38.39 KG/M2 | HEIGHT: 72 IN | HEART RATE: 61 BPM | WEIGHT: 283.4 LBS

## 2020-03-16 DIAGNOSIS — I42.0 DILATED CARDIOMYOPATHY (HCC): Primary | ICD-10-CM

## 2020-03-16 PROCEDURE — 99213 OFFICE O/P EST LOW 20 MIN: CPT | Performed by: NURSE PRACTITIONER

## 2020-03-16 NOTE — PROGRESS NOTES
Subjective:     Chief Complaint   Patient presents with   • Congestive Heart Failure     chief complaint     Congestive Heart Failure   Presents for follow-up visit. The disease course has been worsening. Pertinent negatives include no abdominal pain, chest pain, claudication, edema, fatigue, orthopnea, palpitations, paroxysmal nocturnal dyspnea, shortness of breath or unexpected weight change. The symptoms have been improving. Past treatments include ACE inhibitors, aldosterone receptor blockers, beta blockers, digoxin and salt and fluid restriction. The treatment provided moderate relief. Compliance with prior treatments has been good. His past medical history is significant for DM and HTN. There is no history of CAD, chronic lung disease, hyperthyroidism or valvular heart disease. Compliance with total regimen is 51-75%. Compliance problems include adherence to exercise.  Compliance with diet is %. Compliance with exercise is 51-75%. Compliance with medications is %.     The patient is a 53-year-old gentleman with history of nonischemic cardiomyopathy, undergoing cardiac catheterization in 2011.  The patient has been on optimal medical therapy since that time.  In approximately December 2016, transthoracic echocardiogram demonstrated improvement in LVEF to 50%.  Dr. Sharma subsequently decreased Lasix to 40 mg daily.    On 05/02/2018, the patient presented for office evaluation with Dr. Sharma at which time he was found to have clinical evidence of hypervolemia thus receiving in office subcutaneous Lasix and ejection.   In addition, diuretic therapy was increased to Lasix 40 mg twice a day.    On 05/04/2018, the patient presented for evaluation with ANNA MARIE Post, thus receiving additional 40 mg subcutaneous Lasix, along with continuation of Lasix 40 mg twice a day oral.    On 05/09/2018 the patient presented back for reevaluation with ANNA MARIE Post at which time he presented euvolemic.   Lasix 40 mg twice daily was continued with plans for follow-up today.    06/05/2018  The patient is reporting improvement in his overall dietary choices (compliance with dietary sodium restriction); compliance with fluid restrictions of 1 L per day; compliance with medications as prescribed.    6/10/19: 1 year followup. Has not been seen since last year. Reports medications are affecting his sex life (decreased libido, decreased ability to achieve erection). Patient appears euvolemic and without symptoms of fluid overload including edema, SOA, fatigue.     3/16/20: Mr. Teresa returns today after long interval. He does not keep regularly scheduled follow ups but calls when he wants to be seen. He has lost 20+lbs. And has been doing well. He is off of most medications now only taking Coreg, digoxin, and Aldactone. Can stop Digoxin. Will repeat echo.       Review of Systems   Constitution: Negative for chills, decreased appetite, fatigue, fever and unexpected weight change.   HENT: Negative for congestion and sore throat.    Eyes: Negative.    Cardiovascular: Negative for chest pain, claudication, dyspnea on exertion, irregular heartbeat, leg swelling, orthopnea, palpitations and paroxysmal nocturnal dyspnea.   Respiratory: Negative for cough, shortness of breath and wheezing.    Endocrine: Negative.  Negative for polydipsia, polyphagia and polyuria.   Hematologic/Lymphatic: Negative for bleeding problem. Does not bruise/bleed easily.   Skin: Negative for dry skin, flushing and rash.   Musculoskeletal: Negative for falls and myalgias.   Gastrointestinal: Negative for bloating, abdominal pain, change in bowel habit and melena.   Genitourinary: Negative for frequency and hematuria.   Neurological: Negative for dizziness, light-headedness, loss of balance and weakness.   Psychiatric/Behavioral: Negative for altered mental status and memory loss. The patient is not nervous/anxious.      Current Outpatient Medications    "  Medication Sig Dispense Refill   • albuterol (PROAIR HFA) 108 (90 Base) MCG/ACT inhaler Inhale 2 puffs Every 4 (Four) Hours As Needed for Wheezing.     • carvedilol (COREG) 25 MG tablet Take 1 tablet by mouth 2 (Two) Times a Day With Meals. 180 tablet 3   • digoxin (LANOXIN) 125 MCG tablet Take 1 tablet by mouth Daily. 90 tablet 3   • spironolactone (ALDACTONE) 25 MG tablet Take 1 tablet by mouth Daily. 90 tablet 3     No current facility-administered medications for this visit.      Objective:     Vitals:    03/16/20 0902   BP: 122/76   BP Location: Left arm   Patient Position: Sitting   Cuff Size: Adult   Pulse: 61   SpO2: 99%   Weight: 129 kg (283 lb 6.4 oz)   Height: 182.9 cm (72\")     Wt Readings from Last 3 Encounters:   03/16/20 129 kg (283 lb 6.4 oz)   06/11/19 (!) 145 kg (319 lb 6.4 oz)   07/05/18 (!) 143 kg (315 lb 6.4 oz)     Physical Exam   Constitutional: He is oriented to person, place, and time. Vital signs are normal. He appears well-developed and well-nourished. No distress.   HENT:   Head: Normocephalic and atraumatic.   Neck: No hepatojugular reflux and no JVD present.   Cardiovascular: Normal rate, regular rhythm, S1 normal, S2 normal, normal heart sounds and intact distal pulses. Exam reveals no gallop, no S3 and no S4.   No murmur heard.  Pulmonary/Chest: Effort normal and breath sounds normal. No stridor. No respiratory distress. He has no wheezes. He has no rales.   Abdominal: Soft. Bowel sounds are normal. He exhibits no distension. There is no tenderness.   Musculoskeletal: Normal range of motion. He exhibits no edema or tenderness.   Neurological: He is alert and oriented to person, place, and time.   Skin: Skin is warm and dry. No erythema.   Psychiatric: He has a normal mood and affect. His behavior is normal. Judgment and thought content normal.     Data Reviewed:     6/11/19: ECG          Transthoracic echocardogram: 06/18/2018  · The left ventricular cavity is moderately " dilated.  · Left ventricular wall thickness is consistent with moderate concentric hypertrophy.  · Estimated EF = 33%. Global hypokinesis of the left ventricle  · Left ventricular diastolic dysfunction (grade I) consistent with impaired relaxation.  · Right ventricular cavity is mildly dilated.  · Left atrial cavity size is moderately dilated.  · Mild mitral valve regurgitation is present  · Mild-to-moderate pulmonic valve regurgitation is present.    Transthoracic echocardiogram: 12/02/2016      Transthoracic echocardiogram: 09/29/2016      Left heart catheterization: 07/17/2011  HEMODYNAMICS: Aortic pressure 152/81 Mean 110 Left ventricular pressure 152/18   LEFT VENTRICULOGRAM: Shows mild left ventricular enlargement with overall preserved left ventricular systolic function with estimated ejection fraction of about 55%.   CORONARY ARTERIES: Right dominant system   LEFT MAIN: Free of any disease.   LAD: Medium caliber vessel which in the proximal portion had evidence of luminal irregularity with good MARQUISE 3 flow. The diagonal branch had minimal plaquing with evidence of good MARQUISE 3 flow.   CX: Medium caliber vessel which gave out a medium caliber ramus intermedius branch which was free of any obstructive stenosis. The circumflex artery after the origin of the ramus intermedius branch was free of any of obstructive stenosis with good MARQUISE 3 flow.   RCA: Medium caliber dominant vessel which was free of any obstructive stenosis with good MARQUISE 3 flow.   FINAL DX:   1. No evidence of any obstructive epicardial coronary artery disease.   2. Preserved left ventricular systolic function with estimated ejection fraction of about 55%.   LEONID GREER M.D.       Lab Results   Component Value Date    GLUCOSE 89 05/09/2018    BUN 20 05/09/2018    CREATININE 1.33 (H) 05/09/2018    EGFRIFAFRI 68 05/09/2018    BCR 15.0 05/09/2018    K 4.1 05/09/2018    CO2 24.0 05/09/2018    CALCIUM 9.4 05/09/2018    ALBUMIN 4.30 05/09/2018     AST 30 05/09/2018    ALT 39 05/09/2018     Lab Results   Component Value Date    WBC 6.75 05/02/2018    HGB 14.4 05/02/2018    HCT 43.4 05/02/2018    MCV 91.2 05/02/2018     05/02/2018     Lab Results   Component Value Date    CHOL 176 07/14/2017    TRIG 151 07/14/2017    HDL 33 (L) 07/14/2017     07/14/2017     Lab Results   Component Value Date    HGBA1C 5.9 (H) 05/02/2018     Lab Results   Component Value Date    TSH 1.120 05/02/2018      Ref. Range 7/14/2017 13:50 5/2/2018 15:42   proBNP Latest Ref Range: 0.0 - 900.0 pg/mL 229.0 2,620.0 (H)     The following portions of the patient's history were reviewed and updated as appropriate: allergies, current medications, past family history, past medical history, past social history, past surgical history and problem list.  Old records reviewed and pertinent information is included in the above objective data.      Assessment/Plan:      Diagnosis Plan   1. Chronic systolic CHF (congestive heart failure) without clinical evidence of hypervolemia; he is well perfused AHA Stage C ; NYHA Class II   LVEF: 33%  BETA-BLOCKER: Carvedilol 25 mg twice daily  ACE/ARB: Lisinopril 40 mg daily- discontinued by another provider  ENTRESTO: N/A  DIURETIC: Lasix to 40 mg daily- discontinued by another provider  ALDOSTERONT ANTAGONIST: Spironolactone 25mg daily  IMDUR/HYDRALAZINE: N/A  DIGOXIN: 125 µg daily (05/02/2018)  Fluid restriction: 1.5 L daily  Sodium restriction: 2000 mg daily   6MWT: 06/05/2018   Cardiac Rehab: N/A  Pulmonary Rehab: N/A  ICD: N/A  CardioMEMS: N/A  Advanced HF evaluation (Transplant/LVAD): N/A@this time    Recommended daily weight monitoring.  Discussed patient action plan for heart failure.  Recommended avoiding NSAIDs use.  Discussed warning signs requiring additional medical attention for heart failure.    Activity: Approximately 150 minutes of moderate activity (such as walking program)  per week (20-30 minutes most days of the week)  Diet:  Heart healthy foods - more fresh fruits and vegetables and whole grains; less red meat; more fish and poultry that is baked or grilled - not fried; less salt not to exceed 2000 mg daily - less processed food; No trans or saturated fat  Non Smoker  Diabetes management  Blood pressure management  Weight management: Patient's Body mass index is 38.44 kg/m². BMI is above normal parameters. Recommendations include: exercise counseling and nutrition counseling.  Stress management    LABS today.               2. Type 2 diabetes mellitus without complication, without long-term current use of insulin  FSBS improved.        3. Morbid obesity  Lifestyle modifications as outlined in #1 ;  Discussion regarding symptoms or prior evaluation for MAX in follow-up               Follow up: 6 Months with Dr. Vallejo. Former [facility] patient.           This document has been electronically signed by ANNA MARIE Lantigua on March 16, 2020 09:18

## 2020-04-14 ENCOUNTER — TELEPHONE (OUTPATIENT)
Dept: CARDIOLOGY | Facility: CLINIC | Age: 55
End: 2020-04-14

## 2020-04-14 NOTE — TELEPHONE ENCOUNTER
Called with echo results.   EF remains less than 35%.   This is unchanged from prior (2016).   He remains employed at [employer] and is NYHA class I.   MR and TR have worsened since 2018.           This document has been electronically signed by ANNA MARIE Lantigua on April 14, 2020 11:14

## 2020-07-20 RX ORDER — SPIRONOLACTONE 25 MG/1
25 TABLET ORAL DAILY
Qty: 90 TABLET | Refills: 3 | Status: SHIPPED | OUTPATIENT
Start: 2020-07-20 | End: 2021-08-02

## 2020-09-16 ENCOUNTER — LAB (OUTPATIENT)
Dept: LAB | Facility: HOSPITAL | Age: 55
End: 2020-09-16

## 2020-09-16 ENCOUNTER — OFFICE VISIT (OUTPATIENT)
Dept: CARDIOLOGY | Facility: CLINIC | Age: 55
End: 2020-09-16

## 2020-09-16 VITALS
WEIGHT: 291.8 LBS | SYSTOLIC BLOOD PRESSURE: 130 MMHG | DIASTOLIC BLOOD PRESSURE: 76 MMHG | HEIGHT: 72 IN | BODY MASS INDEX: 39.52 KG/M2 | OXYGEN SATURATION: 99 % | HEART RATE: 78 BPM

## 2020-09-16 DIAGNOSIS — I50.23 ACUTE ON CHRONIC SYSTOLIC CHF (CONGESTIVE HEART FAILURE) (HCC): ICD-10-CM

## 2020-09-16 DIAGNOSIS — I42.0 DILATED CARDIOMYOPATHY (HCC): Primary | ICD-10-CM

## 2020-09-16 DIAGNOSIS — E66.01 MORBID OBESITY (HCC): ICD-10-CM

## 2020-09-16 DIAGNOSIS — I10 ESSENTIAL HYPERTENSION: ICD-10-CM

## 2020-09-16 DIAGNOSIS — I10 ESSENTIAL HYPERTENSION: Primary | ICD-10-CM

## 2020-09-16 DIAGNOSIS — R06.02 SOB (SHORTNESS OF BREATH): ICD-10-CM

## 2020-09-16 DIAGNOSIS — I42.0 DILATED CARDIOMYOPATHY (HCC): ICD-10-CM

## 2020-09-16 LAB
ALBUMIN SERPL-MCNC: 4.2 G/DL (ref 3.5–5.2)
ALBUMIN/GLOB SERPL: 1.3 G/DL
ALP SERPL-CCNC: 63 U/L (ref 39–117)
ALT SERPL W P-5'-P-CCNC: 21 U/L (ref 1–41)
ANION GAP SERPL CALCULATED.3IONS-SCNC: 12.4 MMOL/L (ref 5–15)
AST SERPL-CCNC: 21 U/L (ref 1–40)
BASOPHILS # BLD AUTO: 0.03 10*3/MM3 (ref 0–0.2)
BASOPHILS NFR BLD AUTO: 0.5 % (ref 0–1.5)
BILIRUB SERPL-MCNC: 1 MG/DL (ref 0–1.2)
BUN SERPL-MCNC: 10 MG/DL (ref 6–20)
BUN/CREAT SERPL: 7 (ref 7–25)
CALCIUM SPEC-SCNC: 9.5 MG/DL (ref 8.6–10.5)
CHLORIDE SERPL-SCNC: 100 MMOL/L (ref 98–107)
CHOLEST SERPL-MCNC: 123 MG/DL (ref 0–200)
CO2 SERPL-SCNC: 26.6 MMOL/L (ref 22–29)
CREAT SERPL-MCNC: 1.43 MG/DL (ref 0.76–1.27)
DEPRECATED RDW RBC AUTO: 41.9 FL (ref 37–54)
EOSINOPHIL # BLD AUTO: 0.12 10*3/MM3 (ref 0–0.4)
EOSINOPHIL NFR BLD AUTO: 1.8 % (ref 0.3–6.2)
ERYTHROCYTE [DISTWIDTH] IN BLOOD BY AUTOMATED COUNT: 12.9 % (ref 12.3–15.4)
GFR SERPL CREATININE-BSD FRML MDRD: 62 ML/MIN/1.73
GLOBULIN UR ELPH-MCNC: 3.2 GM/DL
GLUCOSE SERPL-MCNC: 97 MG/DL (ref 65–99)
HCT VFR BLD AUTO: 42 % (ref 37.5–51)
HDLC SERPL-MCNC: 28 MG/DL (ref 40–60)
HGB BLD-MCNC: 14.4 G/DL (ref 13–17.7)
IMM GRANULOCYTES # BLD AUTO: 0.02 10*3/MM3 (ref 0–0.05)
IMM GRANULOCYTES NFR BLD AUTO: 0.3 % (ref 0–0.5)
LDLC SERPL CALC-MCNC: 80 MG/DL (ref 0–100)
LDLC/HDLC SERPL: 2.85 {RATIO}
LYMPHOCYTES # BLD AUTO: 1.91 10*3/MM3 (ref 0.7–3.1)
LYMPHOCYTES NFR BLD AUTO: 28.9 % (ref 19.6–45.3)
MCH RBC QN AUTO: 30.7 PG (ref 26.6–33)
MCHC RBC AUTO-ENTMCNC: 34.3 G/DL (ref 31.5–35.7)
MCV RBC AUTO: 89.6 FL (ref 79–97)
MONOCYTES # BLD AUTO: 0.69 10*3/MM3 (ref 0.1–0.9)
MONOCYTES NFR BLD AUTO: 10.4 % (ref 5–12)
NEUTROPHILS NFR BLD AUTO: 3.84 10*3/MM3 (ref 1.7–7)
NEUTROPHILS NFR BLD AUTO: 58.1 % (ref 42.7–76)
NRBC BLD AUTO-RTO: 0 /100 WBC (ref 0–0.2)
NT-PROBNP SERPL-MCNC: 2198 PG/ML (ref 0–900)
PLATELET # BLD AUTO: 232 10*3/MM3 (ref 140–450)
PMV BLD AUTO: 10.7 FL (ref 6–12)
POTASSIUM SERPL-SCNC: 4 MMOL/L (ref 3.5–5.2)
PROT SERPL-MCNC: 7.4 G/DL (ref 6–8.5)
RBC # BLD AUTO: 4.69 10*6/MM3 (ref 4.14–5.8)
SODIUM SERPL-SCNC: 139 MMOL/L (ref 136–145)
TRIGL SERPL-MCNC: 76 MG/DL (ref 0–150)
VLDLC SERPL-MCNC: 15.2 MG/DL (ref 5–40)
WBC # BLD AUTO: 6.61 10*3/MM3 (ref 3.4–10.8)

## 2020-09-16 PROCEDURE — 83880 ASSAY OF NATRIURETIC PEPTIDE: CPT | Performed by: INTERNAL MEDICINE

## 2020-09-16 PROCEDURE — 36415 COLL VENOUS BLD VENIPUNCTURE: CPT | Performed by: INTERNAL MEDICINE

## 2020-09-16 PROCEDURE — 99214 OFFICE O/P EST MOD 30 MIN: CPT | Performed by: INTERNAL MEDICINE

## 2020-09-16 PROCEDURE — 93000 ELECTROCARDIOGRAM COMPLETE: CPT | Performed by: INTERNAL MEDICINE

## 2020-09-16 PROCEDURE — 80061 LIPID PANEL: CPT

## 2020-09-16 PROCEDURE — 85025 COMPLETE CBC W/AUTO DIFF WBC: CPT | Performed by: INTERNAL MEDICINE

## 2020-09-16 PROCEDURE — 80053 COMPREHEN METABOLIC PANEL: CPT | Performed by: INTERNAL MEDICINE

## 2020-09-16 RX ORDER — IBUPROFEN 800 MG/1
TABLET ORAL
COMMUNITY
Start: 2020-08-28 | End: 2022-01-20

## 2020-09-16 NOTE — PROGRESS NOTES
Collin Teresa  55 y.o. male    09/16/2020  1. Dilated cardiomyopathy (CMS/HCC)    2. Essential hypertension    3. Acute on chronic systolic CHF (congestive heart failure) (CMS/HCC)    4. Morbid obesity (CMS/HCC)    5. SOB (shortness of breath)        History of Present Illness  Mr. Teresa is a 55-year-old -American male was being seen by me for the first time.  He was a patient of Dr. Nguyen in the past and has followed up with a heart failure clinic.  His history is remarkable for nonischemic cardiomyopathy with cardiac catheterization in 2011 showing no significant epicardial coronary artery disease.  Back then his ejection fraction was 50%.  He has been on medical therapy with carvedilol, Aldactone.  It appears that he has had evidence of congestive heart failure in May 2018 and was treated with diuretics which made him euvolemic.     Echocardiogram in April 2020 showed:  · Study is technically adequate. Patient refused Definity contrast to evaluate for LV thrombus.  · Left ventricle systolic function is severely decreased at 21-25%. Left ventricular cavity is severely dilated. Restrictive diastolic function.  · Right ventricular systolic function is moderately reduced. Right ventricular cavity is moderately dilated.  · Dilated mitral annulus with moderate to severe mitral regurgitation.  · Dilated tricuspid annulus with at least moderate tricuspid regurgitation. PA systolic pressure is 52 mmHg and consistent with pulmonary hypertension.  · Dilated pulmonic annulus with moderate pulmonary regurgitation.  · Tubular ascending aorta demonstrates ectasia to 3.9 cm. The patient's ASI is 1.4 cm/meter squared.  · Since prior study, there has been interval dilatation of the left ventricle with a reduction in LVEF and worsening valvular regurgitation.    EKG today showed sinus bradycardia with heart rate of 57 bpm with first-degree AV block.  QRS duration was 114 ms.  Diffuse ST-T wave abnormalities were  noted which were unchanged from previous EKGs.  Poor R wave progression anterior leads.    Though his LV function has deteriorated, the patient does not report any change in his overall activity level.  He denies any chest pain or dyspnea.  He has been only on carvedilol and Aldactone and I understand the digoxin was discontinued.  No PND or orthopnea is reported.  He does admit to excessive snoring but there is no documented sleep apnea.    Clinical exam today showed no signs of congestive heart failure.  I had a long discussion with him about appropriate diet and he admits to not watching his fluid or salt intake.  He is morbidly obese.    SUBJECTIVE    No Known Allergies      Past Medical History:   Diagnosis Date   • Abdominal pain    • Cardiomegaly    • Chest pain    • Essential hypertension    • History of EKG 09/19/2016    care center   • History of toxic effects     carbon monxide   • Morbid obesity (CMS/[facility])    • Peripheral edema          Past Surgical History:   Procedure Laterality Date   • CARDIAC CATHETERIZATION      2011 no cad   • ENDOSCOPY           Family History   Problem Relation Age of Onset   • Diabetes Other    • Hypertension Other    • Coronary artery disease Neg Hx          Social History     Socioeconomic History   • Marital status:      Spouse name: Not on file   • Number of children: Not on file   • Years of education: Not on file   • Highest education level: Not on file   Tobacco Use   • Smoking status: Never Smoker   • Smokeless tobacco: Never Used   Substance and Sexual Activity   • Alcohol use: No   • Drug use: No   • Sexual activity: Defer         Current Outpatient Medications   Medication Sig Dispense Refill   • albuterol (PROAIR HFA) 108 (90 Base) MCG/ACT inhaler Inhale 2 puffs Every 4 (Four) Hours As Needed for Wheezing.     • carvedilol (COREG) 25 MG tablet Take 1 tablet by mouth 2 (Two) Times a Day With Meals. 180 tablet 3   • ibuprofen (ADVIL,MOTRIN) 800 MG tablet      •  "spironolactone (ALDACTONE) 25 MG tablet TAKE 1 TABLET BY MOUTH DAILY. 90 tablet 3     No current facility-administered medications for this visit.          OBJECTIVE    /76   Pulse 78   Ht 182.9 cm (72\")   Wt 132 kg (291 lb 12.8 oz)   SpO2 99%   BMI 39.58 kg/m²         Review of Systems     Constitutional:  Denies recent weight loss, weight gain, fever or chills, no change in exercise tolerance     HENT:  Denies any hearing loss, epistaxis, hoarseness, or difficulty speaking.     Eyes: Wears eyeglasses or contact lenses     Respiratory:  Denies dyspnea with exertion,no cough, wheezing, or hemoptysis.     Cardiovascular: Negative for palpations, chest pain    Gastrointestinal:  Denies change in bowel habits, dyspepsia, ulcer disease, hematochezia, or melena.     Endocrine: Negative for cold intolerance, heat intolerance, polydipsia, polyphagia and polyuria.     Genitourinary: Erectile dysfunction    Musculoskeletal: Denies any history of arthritic symptoms or back problems     Skin:  Denies any change in hair or nails, rashes, or skin lesions.     Allergic/Immunologic: Negative.  Negative for environmental allergies, food allergies and immunocompromised state.     Neurological:  Denies any history of recurrent headaches, strokes, TIA, or seizure disorder.     Hematological: Denies any food allergies, seasonal allergies, bleeding disorders, or lymphadenopathy.     Psychiatric/Behavioral: Denies any history of depression, substance abuse, or change in cognitive function.         Physical Exam     Constitutional: Cooperative, alert and oriented, well-developed, well-nourished, in no acute distress.     HENT:   Head: Normocephalic, normal hair patterns, no masses or tenderness.  Ears, Nose, and Throat: No gross abnormalities. No pallor or cyanosis. Dentition good.   Eyes: EOMS intact, PERRL, conjunctivae and lids unremarkable. Fundoscopic exam and visual fields not performed.   Neck: No palpable masses or " adenopathy, no thyromegaly, no JVD, carotid pulses are full and equal bilaterally and without  Bruits.     Cardiovascular: Regular rhythm, S1 and S2 normal, no S3 or S4.  No murmurs, gallops, or rubs detected.     Pulmonary/Chest: Chest: normal symmetry, normal respiratory excursion, no intercostal retraction, no use of accessory muscles.            Pulmonary: Normal breath sounds. No rales or ronchi.    Abdominal: Abdomen soft, bowel sounds normoactive, no masses, no hepatosplenomegaly, non-tender, no bruits.     Musculoskeletal: No deformities, clubbing, cyanosis, erythema, or edema observed.     Neurological: No gross motor or sensory deficits noted, affect appropriate, oriented to time, person, place.     Skin: Warm and dry to the touch, no apparent skin lesions or masses noted.     Psychiatric: He has a normal mood and affect. His behavior is normal. Judgment and thought content normal.         Procedures      Lab Results   Component Value Date    WBC 6.75 05/02/2018    HGB 14.4 05/02/2018    HCT 43.4 05/02/2018    MCV 91.2 05/02/2018     05/02/2018     Lab Results   Component Value Date    GLUCOSE 89 05/09/2018    BUN 20 05/09/2018    CREATININE 1.33 (H) 05/09/2018    EGFRIFAFRI 68 05/09/2018    BCR 15.0 05/09/2018    CO2 24.0 05/09/2018    CALCIUM 9.4 05/09/2018    ALBUMIN 4.30 05/09/2018    AST 30 05/09/2018    ALT 39 05/09/2018     Lab Results   Component Value Date    CHOL 176 07/14/2017     Lab Results   Component Value Date    TRIG 151 07/14/2017     Lab Results   Component Value Date    HDL 33 (L) 07/14/2017     No components found for: LDLCALC  Lab Results   Component Value Date     07/14/2017     No results found for: HDLLDLRATIO  No components found for: CHOLHDL  Lab Results   Component Value Date    HGBA1C 5.9 (H) 05/02/2018     Lab Results   Component Value Date    TSH 1.120 05/02/2018           ASSESSMENT AND PLAN  Mr. Teresa has nonischemic cardiomyopathy in the background of  hypertension, morbid obesity, borderline diabetes mellitus.  His creatinine has been 1.3 in the past.  I understand that he has had problems with lisinopril in the past and is not on an ACE inhibitor or ARB.  His ejection fraction is 21 to 25% with multi valvular insufficiency and pulmonary hypertension as described above.  I had a long discussion with him about his cardiac condition, which interestingly he was unaware of!  I stressed the importance of appropriate diet and fluid management.  For now I have continued carvedilol and Aldactone and after reviewing the renal function will consider starting him on Entresto.  If his renal function is abnormal a combination of hydralazine and isosorbide dinitrate will be considered.  He does not quite meet the criteria for biventricular pacemaker implantation.    About 40 minutes was spent in the assessment and evaluation of this patient    Collin was seen today for establish care.    Diagnoses and all orders for this visit:    Dilated cardiomyopathy (CMS/HCC)    Essential hypertension    Acute on chronic systolic CHF (congestive heart failure) (CMS/HCC)    Morbid obesity (CMS/HCC)    SOB (shortness of breath)        Patient's Body mass index is 39.58 kg/m². BMI is above normal parameters. Recommendations include: exercise counseling and nutrition counseling.      Collin Teresa  reports that he has never smoked. He has never used smokeless tobacco..    Osiel Vallejo MD  9/16/2020  09:47 CDT

## 2020-09-21 RX ORDER — ISOSORBIDE MONONITRATE 30 MG/1
30 TABLET, EXTENDED RELEASE ORAL DAILY
Qty: 90 TABLET | Refills: 3 | Status: SHIPPED | OUTPATIENT
Start: 2020-09-21 | End: 2021-08-31 | Stop reason: SDUPTHER

## 2020-09-21 RX ORDER — HYDRALAZINE HYDROCHLORIDE 25 MG/1
25 TABLET, FILM COATED ORAL 3 TIMES DAILY
Qty: 270 TABLET | Refills: 2 | Status: SHIPPED | OUTPATIENT
Start: 2020-09-21 | End: 2021-08-31 | Stop reason: SDUPTHER

## 2020-09-21 NOTE — PROGRESS NOTES
Lab results discussed with patient    Dr Paredes would like to start patient on imdur 30mg and hydralazine 25mg TID   Patient verbalized understanding and rx sent.

## 2021-01-15 ENCOUNTER — TELEPHONE (OUTPATIENT)
Dept: CARDIOLOGY | Facility: CLINIC | Age: 56
End: 2021-01-15

## 2021-01-15 NOTE — TELEPHONE ENCOUNTER
Returned call. Wanting to make a f/u appt since the holiday. Needs appt when on 2nd shift.   Feb 1st at 0830    ----- Message from Ana Rizzo sent at 1/15/2021 10:30 AM CST -----  Regarding: appt  Pt came to the desk and asked to make an appointment with you. I asked him what he needed to be seen for and he said he just needed to be seen. He's not due to come back and see you until March. Told him I would send you a message and see what you wanted to do and give him a call.     Pt contact is [phone]    Thank you.

## 2021-01-18 RX ORDER — CARVEDILOL 25 MG/1
TABLET ORAL
Qty: 180 TABLET | Refills: 3 | Status: SHIPPED | OUTPATIENT
Start: 2021-01-18 | End: 2022-04-25 | Stop reason: SDUPTHER

## 2021-02-01 ENCOUNTER — OFFICE VISIT (OUTPATIENT)
Dept: CARDIOLOGY | Facility: CLINIC | Age: 56
End: 2021-02-01

## 2021-02-01 VITALS
HEIGHT: 72 IN | SYSTOLIC BLOOD PRESSURE: 118 MMHG | BODY MASS INDEX: 42.45 KG/M2 | WEIGHT: 313.4 LBS | OXYGEN SATURATION: 97 % | DIASTOLIC BLOOD PRESSURE: 78 MMHG | HEART RATE: 90 BPM

## 2021-02-01 DIAGNOSIS — I42.0 DILATED CARDIOMYOPATHY (HCC): Primary | ICD-10-CM

## 2021-02-01 PROCEDURE — 99214 OFFICE O/P EST MOD 30 MIN: CPT | Performed by: NURSE PRACTITIONER

## 2021-02-01 RX ORDER — ALBUTEROL SULFATE 90 UG/1
2 AEROSOL, METERED RESPIRATORY (INHALATION) EVERY 4 HOURS PRN
Qty: 6.7 G | Refills: 3 | Status: SHIPPED | OUTPATIENT
Start: 2021-02-01 | End: 2022-01-20 | Stop reason: SDUPTHER

## 2021-02-01 RX ORDER — FUROSEMIDE 40 MG/1
40 TABLET ORAL DAILY PRN
Qty: 90 TABLET | Refills: 3 | Status: SHIPPED | OUTPATIENT
Start: 2021-02-01 | End: 2022-01-19 | Stop reason: SDUPTHER

## 2021-05-17 ENCOUNTER — TELEPHONE (OUTPATIENT)
Dept: CARDIOLOGY | Facility: CLINIC | Age: 56
End: 2021-05-17

## 2021-05-17 DIAGNOSIS — M25.562 ACUTE PAIN OF LEFT KNEE: Primary | ICD-10-CM

## 2021-05-17 NOTE — TELEPHONE ENCOUNTER
Right knee pain and right leg swelling.     Left leg normal.    No reason to suspect DVT.     Seeing Dr. Navarro's in the AM.    Has had Lasix over the weekend without much improvement.     The swelling is mild, not severe.

## 2021-05-18 ENCOUNTER — OFFICE VISIT (OUTPATIENT)
Dept: ORTHOPEDIC SURGERY | Facility: CLINIC | Age: 56
End: 2021-05-18

## 2021-05-18 VITALS
WEIGHT: 300 LBS | DIASTOLIC BLOOD PRESSURE: 70 MMHG | SYSTOLIC BLOOD PRESSURE: 100 MMHG | RESPIRATION RATE: 20 BRPM | HEART RATE: 74 BPM | HEIGHT: 72 IN | BODY MASS INDEX: 40.63 KG/M2 | OXYGEN SATURATION: 98 %

## 2021-05-18 DIAGNOSIS — E11.9 TYPE 2 DIABETES MELLITUS WITHOUT COMPLICATION, WITHOUT LONG-TERM CURRENT USE OF INSULIN (HCC): ICD-10-CM

## 2021-05-18 DIAGNOSIS — I50.23 ACUTE ON CHRONIC SYSTOLIC CHF (CONGESTIVE HEART FAILURE) (HCC): ICD-10-CM

## 2021-05-18 DIAGNOSIS — I42.0 DILATED CARDIOMYOPATHY (HCC): ICD-10-CM

## 2021-05-18 DIAGNOSIS — E66.01 MORBID OBESITY WITH BMI OF 40.0-44.9, ADULT (HCC): ICD-10-CM

## 2021-05-18 DIAGNOSIS — M25.561 RIGHT KNEE PAIN, UNSPECIFIED CHRONICITY: ICD-10-CM

## 2021-05-18 DIAGNOSIS — I10 ESSENTIAL HYPERTENSION: ICD-10-CM

## 2021-05-18 DIAGNOSIS — M23.91 INTERNAL DERANGEMENT OF RIGHT KNEE: Primary | ICD-10-CM

## 2021-05-18 PROCEDURE — 99204 OFFICE O/P NEW MOD 45 MIN: CPT | Performed by: ORTHOPAEDIC SURGERY

## 2021-05-18 NOTE — PROGRESS NOTES
Collin Teresa is a 56 y.o. male   Primary provider:  Ginette Lujan APRN       Chief Complaint   Patient presents with   • Right Knee - Initial Evaluation       HISTORY OF PRESENT ILLNESS:  Patient in for initial eval of right knee pain.   Xray completed upon arrival.   Pain is 6/10  2 weeks ago was in so much pain he couldn't walk on it and is sleeping in the recliner to keep it propped up at night.   Patient has been having 3 weeks of increased pain.  He states that he has pain with increased activity.  He has pain along the inside part of his knee.  He has pain with pivoting and twisting.  Pain with deep knee bends.  He took ibuprofen that was not helpful.  Patient states that it does wake him up at night.  No specific injury that he recalls.  He is unable to do his job because of the pain and has been off work.    Knee Pain   Incident onset: 2 months ago. There was no injury mechanism. The pain is present in the right knee. The quality of the pain is described as aching. The pain is severe. He reports no foreign bodies present. Exacerbated by: driving.        CONCURRENT MEDICAL HISTORY:    Past Medical History:   Diagnosis Date   • Abdominal pain    • Cardiomegaly    • Chest pain    • Essential hypertension    • History of EKG 09/19/2016    care center   • History of toxic effects     carbon monxide   • Morbid obesity (CMS/HCC)    • Peripheral edema        No Known Allergies      Current Outpatient Medications:   •  albuterol (PROAIR HFA) 108 (90 Base) MCG/ACT inhaler, Inhale 2 puffs Every 4 (Four) Hours As Needed for Wheezing., Disp: , Rfl:   •  albuterol sulfate  (90 Base) MCG/ACT inhaler, Inhale 2 puffs Every 4 (Four) Hours As Needed for Wheezing or Shortness of Air., Disp: 6.7 g, Rfl: 3  •  carvedilol (COREG) 25 MG tablet, TAKE ONE TABLET BY MOUTH TWO TIMES A DAY WITH MEALS, Disp: 180 tablet, Rfl: 3  •  furosemide (LASIX) 40 MG tablet, Take 1 tablet by mouth Daily As Needed (weight gain of  "3lbs overnight or 5lbs in a week.)., Disp: 90 tablet, Rfl: 3  •  hydrALAZINE (APRESOLINE) 25 MG tablet, Take 1 tablet by mouth 3 (Three) Times a Day., Disp: 270 tablet, Rfl: 2  •  ibuprofen (ADVIL,MOTRIN) 800 MG tablet, , Disp: , Rfl:   •  isosorbide mononitrate (IMDUR) 30 MG 24 hr tablet, Take 1 tablet by mouth Daily., Disp: 90 tablet, Rfl: 3  •  ivabradine HCl (CORLANOR) 5 MG tablet tablet, Take 1 tablet by mouth 2 (Two) Times a Day With Meals., Disp: 60 tablet, Rfl: 11  •  spironolactone (ALDACTONE) 25 MG tablet, TAKE 1 TABLET BY MOUTH DAILY., Disp: 90 tablet, Rfl: 3    Past Surgical History:   Procedure Laterality Date   • CARDIAC CATHETERIZATION      2011 no cad   • ENDOSCOPY         Family History   Problem Relation Age of Onset   • Diabetes Other    • Hypertension Other    • Coronary artery disease Neg Hx        Social History     Socioeconomic History   • Marital status:      Spouse name: Not on file   • Number of children: Not on file   • Years of education: Not on file   • Highest education level: Not on file   Tobacco Use   • Smoking status: Never Smoker   • Smokeless tobacco: Never Used   Substance and Sexual Activity   • Alcohol use: No   • Drug use: No   • Sexual activity: Defer        Review of Systems   Constitutional: Negative.    HENT: Negative.    Eyes: Negative.    Respiratory: Negative.    Cardiovascular: Negative.    Gastrointestinal: Negative.    Endocrine: Negative.    Genitourinary: Negative.    Musculoskeletal:        Right knee pain   Skin: Negative.    Allergic/Immunologic: Negative.    Neurological: Negative.    Hematological: Negative.    Psychiatric/Behavioral: Negative.        PHYSICAL EXAMINATION:       /70   Pulse 74   Resp 20   Ht 182.9 cm (72\")   Wt 136 kg (300 lb)   SpO2 98%   BMI 40.69 kg/m²     Physical Exam  Constitutional:       General: He is not in acute distress.     Appearance: He is obese.   Pulmonary:      Effort: Pulmonary effort is normal. No " respiratory distress.   Musculoskeletal:      Right knee:      Instability Tests: Medial Fernando test positive.   Neurological:      Mental Status: He is alert and oriented to person, place, and time.         GAIT:     []  Normal  [x]  Antalgic    Assistive device: [x]  None  []  Walker     []  Crutches  []  Cane     []  Wheelchair  []  Stretcher    Right Knee Exam     Muscle Strength   The patient has normal right knee strength.    Tenderness   The patient is experiencing tenderness in the medial joint line and medial retinaculum.    Range of Motion   Extension: -5   Flexion: 110     Tests   Fernando:  Medial - positive   Varus: negative Valgus: negative    Other   Erythema: absent  Sensation: normal  Pulse: present              XR KNEE RIGHT COMPLETE 4 OR MORE VIEWS (04/19/2021 8:21 AM CDT)  XR KNEE RIGHT COMPLETE 4 OR MORE VIEWS (04/19/2021 8:21 AM CDT)   Specimen         XR KNEE RIGHT COMPLETE 4 OR MORE VIEWS (04/19/2021 8:21 AM CDT)   Narrative   [facility] - 04/26/2021 8:21 AM CDT    R knee views show normal bony anatomy. No significant arthritis. Mild medial oint space narrowing on the tunnel view, but not on the standing AP.      XR Knee 1 or 2 View Right    Result Date: 5/18/2021  Narrative: Ordering Provider:  Román Cazares MD Ordering Diagnosis/Indication:  Right knee pain, unspecified chronicity Procedure:  XR KNEE 1 OR 2 VW RIGHT Exam Date:  5/18/21 COMPARISON:  Not applicable, no relevant images available.     Impression:  AP bilateral standing of the knees with lateral of the right knee show acceptable position alignment of both knees with no evidence of acute bony normality.  Mild medial joint space narrowing is noted in both knees.  Relatively smooth articular surface is noted in each knee as well.  Minimal arthritic change noted in the patellofemoral compartment of the right knee.  No acute findings. Román Cazares MD 5/18/21     XR Knee Bilateral AP Standing    Result Date:  5/18/2021  Narrative: Ordering Provider:  Román Cazares MD Ordering Diagnosis/Indication:  Acute pain of left knee Procedure:  XR KNEE BILATERAL AP STANDING Exam Date:  5/18/21 COMPARISON:  Not applicable, no relevant images available.     Impression:  AP bilateral standing of the knees with lateral of the right knee show acceptable position alignment of both knees with no evidence of acute bony normality.  Mild medial joint space narrowing is noted in both knees.  Relatively smooth articular surface is noted in each knee as well.  Minimal arthritic change noted in the patellofemoral compartment of the right knee.  No acute findings. Román Cazares MD 5/18/21               ASSESSMENT:    Diagnoses and all orders for this visit:    Internal derangement of right knee  -     MRI Knee Right Without Contrast; Future    Right knee pain, unspecified chronicity  -     XR Knee 1 or 2 View Right  -     MRI Knee Right Without Contrast; Future    Dilated cardiomyopathy (CMS/[facility])    Acute on chronic systolic CHF (congestive heart failure) (CMS/[facility])    Essential hypertension    Type 2 diabetes mellitus without complication, without long-term current use of insulin (CMS/[facility])    Morbid obesity with BMI of 40.0-44.9, adult (CMS/[facility])          PLAN    Patient was encouraged to employ healthy lifestyle choices.  Work on strength and conditioning exercises.  He has had pain for 4 weeks or so.  He has pain with pivoting and twisting and the pain is affecting his sleep.  He has tried anti-inflammatory and we discussed continuing intermittent use of ibuprofen as needed for pain control.  Cannot take regular anti-inflammatory medication due to chronic kidney disease stage III.    Continue with ice and elevation for swelling control.    He has 2+ pitting edema and we discussed trying to improve the edema.    He will continue off work.    Proceed with MRI of the right knee to assess for internal derangement.    Return for  recheck for MRI results.    Román Cazares MD

## 2021-05-24 ENCOUNTER — OFFICE VISIT (OUTPATIENT)
Dept: CARDIOLOGY | Facility: CLINIC | Age: 56
End: 2021-05-24

## 2021-05-24 VITALS
SYSTOLIC BLOOD PRESSURE: 102 MMHG | BODY MASS INDEX: 40.09 KG/M2 | OXYGEN SATURATION: 99 % | WEIGHT: 296 LBS | HEIGHT: 72 IN | DIASTOLIC BLOOD PRESSURE: 74 MMHG | HEART RATE: 64 BPM

## 2021-05-24 DIAGNOSIS — I42.0 DILATED CARDIOMYOPATHY (HCC): Primary | ICD-10-CM

## 2021-05-24 PROCEDURE — 99214 OFFICE O/P EST MOD 30 MIN: CPT | Performed by: NURSE PRACTITIONER

## 2021-05-24 NOTE — PROGRESS NOTES
Subjective:     Chief Complaint   Patient presents with   • Cardiomyopathy     chief complaint     Congestive Heart Failure  Presents for follow-up visit. The disease course has been worsening. Pertinent negatives include no abdominal pain, chest pain, claudication, edema, fatigue, orthopnea, palpitations, paroxysmal nocturnal dyspnea, shortness of breath or unexpected weight change. The symptoms have been improving. Past treatments include ACE inhibitors, aldosterone receptor blockers, beta blockers, digoxin and salt and fluid restriction. The treatment provided moderate relief. Compliance with prior treatments has been good. His past medical history is significant for DM and HTN. There is no history of CAD, chronic lung disease, hyperthyroidism or valvular heart disease. Compliance with total regimen is 51-75%. Compliance problems include adherence to exercise.  Compliance with diet is %. Compliance with exercise is 51-75%. Compliance with medications is %.     The patient is a 53-year-old gentleman with history of nonischemic cardiomyopathy, undergoing cardiac catheterization in 2011.  The patient has been on optimal medical therapy since that time.  In approximately December 2016, transthoracic echocardiogram demonstrated improvement in LVEF to 50%.  Dr. Sharma subsequently decreased Lasix to 40 mg daily.    On 05/02/2018, the patient presented for office evaluation with Dr. Sharma at which time he was found to have clinical evidence of hypervolemia thus receiving in office subcutaneous Lasix and ejection.   In addition, diuretic therapy was increased to Lasix 40 mg twice a day.    On 05/04/2018, the patient presented for evaluation with ANNA MARIE Post, thus receiving additional 40 mg subcutaneous Lasix, along with continuation of Lasix 40 mg twice a day oral.    On 05/09/2018 the patient presented back for reevaluation with ANNA MARIE Post at which time he presented euvolemic.  Lasix 40 mg  twice daily was continued with plans for follow-up today.    06/05/2018  The patient is reporting improvement in his overall dietary choices (compliance with dietary sodium restriction); compliance with fluid restrictions of 1 L per day; compliance with medications as prescribed.    6/10/19: 1 year followup. Has not been seen since last year. Reports medications are affecting his sex life (decreased libido, decreased ability to achieve erection). Patient appears euvolemic and without symptoms of fluid overload including edema, SOA, fatigue.     3/16/20: Mr. Teresa returns today after long interval. He does not keep regularly scheduled follow ups but calls when he wants to be seen. He has lost 20+lbs. And has been doing well. He is off of most medications now only taking Coreg, digoxin, and Aldactone. Can stop Digoxin. Will repeat echo.     1/29/2021: Mr. Teresa returns today after calling to be seen. He is doing well. Repeated echo last year with ongoing DCM with EF of 21%. He is still taking Coreg, Hydralazine, Imdur, and aldactone. CKD III, we have been cautious with ACE/ARB use.   He is having URI symptoms and frequent visits to PCP for abx and steroids. He has never smoked. He has exposure to rock dust at work. He feels it is beginning to cause him problems. He has not had PFTs. I offered a CXR today. He wants to delay this and get it done the next time he is here. We discussed disability. I brought it up. His DCM will catch up with him eventually, but for now he is maintaining his NYHA Class II symptoms. We discussed his echo. No ICD at this time due to his job.   Will add Corlanor as he is on 25mg BID Coreg and HR is still 90.     5/24/21:  3 month follow up. He has been doing well, still working. He is having knee pain and seeing Ortho. MRI Thursday.   CHILEL continues but is controlled. He has had to modify his work habits to allow for his breathing to recover.   Should he need knee surgery, he will be able to  have that from a Cardiology stand point. He is NYHA class II.   He had called last week to discuss unilateral leg swelling. Looking at it today, the right leg is just slightly larger than left. There is trace pitting edema in left and 1+ in right.   HR is improved with Corlanor. Weight is down.   We discussed disability again. This may be a good opportunity depending on what his MRI shows regarding disability claim. 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            Review of Systems   Constitutional: Negative for chills, decreased appetite, fatigue, fever and unexpected weight change.   HENT: Negative for congestion and sore throat.    Eyes: Negative.    Cardiovascular: Negative for chest pain, claudication, dyspnea on exertion, irregular heartbeat, leg swelling, orthopnea, palpitations and paroxysmal nocturnal dyspnea.   Respiratory: Negative for cough, shortness of breath and wheezing.    Endocrine: Negative.  Negative for polydipsia, polyphagia and polyuria.   Hematologic/Lymphatic: Negative for bleeding problem. Does not bruise/bleed easily.   Skin: Negative for dry skin, flushing and rash.   Musculoskeletal: Negative for falls and myalgias.   Gastrointestinal: Negative for bloating, abdominal pain, change in bowel habit and melena.   Genitourinary: Negative for frequency and hematuria.   Neurological: Negative for dizziness, light-headedness, loss of balance and weakness.   Psychiatric/Behavioral: Negative for altered mental status and memory loss. The patient is not nervous/anxious.      Current Outpatient Medications   Medication Sig Dispense Refill   • albuterol (PROAIR HFA) 108 (90 Base) MCG/ACT inhaler Inhale 2 puffs Every 4 (Four) Hours As Needed for Wheezing.     • albuterol sulfate  (90 Base) MCG/ACT inhaler Inhale 2 puffs Every 4 (Four) Hours As Needed for Wheezing or Shortness of Air. 6.7 g 3   • carvedilol (COREG) 25 MG tablet TAKE ONE TABLET BY  "MOUTH TWO TIMES A DAY WITH MEALS 180 tablet 3   • furosemide (LASIX) 40 MG tablet Take 1 tablet by mouth Daily As Needed (weight gain of 3lbs overnight or 5lbs in a week.). 90 tablet 3   • hydrALAZINE (APRESOLINE) 25 MG tablet Take 1 tablet by mouth 3 (Three) Times a Day. 270 tablet 2   • ibuprofen (ADVIL,MOTRIN) 800 MG tablet      • isosorbide mononitrate (IMDUR) 30 MG 24 hr tablet Take 1 tablet by mouth Daily. 90 tablet 3   • ivabradine HCl (CORLANOR) 5 MG tablet tablet Take 1 tablet by mouth 2 (Two) Times a Day With Meals. 60 tablet 11   • spironolactone (ALDACTONE) 25 MG tablet TAKE 1 TABLET BY MOUTH DAILY. 90 tablet 3     No current facility-administered medications for this visit.     Objective:     Vitals:    05/24/21 1020   BP: 102/74   BP Location: Left arm   Patient Position: Sitting   Cuff Size: Adult   Pulse: 64   SpO2: 99%   Weight: 134 kg (296 lb)   Height: 182.9 cm (72\")     Wt Readings from Last 3 Encounters:   05/24/21 134 kg (296 lb)   05/18/21 136 kg (300 lb)   02/01/21 (!) 142 kg (313 lb 6.4 oz)     Physical Exam  Constitutional:       General: He is not in acute distress.     Appearance: He is well-developed.   HENT:      Head: Normocephalic and atraumatic.   Neck:      Vascular: No JVD.   Cardiovascular:      Rate and Rhythm: Normal rate and regular rhythm.      Pulses: Intact distal pulses.      Heart sounds: Normal heart sounds, S1 normal and S2 normal. No murmur heard.     Pulmonary:      Effort: Pulmonary effort is normal. No respiratory distress.      Breath sounds: Normal breath sounds. No wheezing or rales.   Abdominal:      General: Bowel sounds are normal.      Palpations: Abdomen is soft.   Musculoskeletal:         General: Normal range of motion.      Cervical back: Normal range of motion.   Skin:     General: Skin is warm and dry.      Findings: No erythema.   Neurological:      Mental Status: He is alert and oriented to person, place, and time.   Psychiatric:         Behavior: " Behavior normal.         Thought Content: Thought content normal.         Judgment: Judgment normal.       Data Reviewed:           Results for orders placed during the hospital encounter of 04/13/20    Adult Transthoracic Echo Complete W/ Cont if Necessary Per Protocol    Interpretation Summary  · Study is technically adequate. Patient refused Definity contrast to evaluate for LV thrombus.  · Left ventricle systolic function is severely decreased at 21-25%. Left ventricular cavity is severely dilated. Restrictive diastolic function.  · Right ventricular systolic function is moderately reduced. Right ventricular cavity is moderately dilated.  · Dilated mitral annulus with moderate to severe mitral regurgitation.  · Dilated tricuspid annulus with at least moderate tricuspid regurgitation. PA systolic pressure is 52 mmHg and consistent with pulmonary hypertension.  · Dilated pulmonic annulus with moderate pulmonary regurgitation.  · Tubular ascending aorta demonstrates ectasia to 3.9 cm. The patient's ASI is 1.4 cm/meter squared.  · Since prior study, there has been interval dilatation of the left ventricle with a reduction in LVEF and worsening valvular regurgitation.      Transthoracic echocardogram: 06/18/2018  · The left ventricular cavity is moderately dilated.  · Left ventricular wall thickness is consistent with moderate concentric hypertrophy.  · Estimated EF = 33%. Global hypokinesis of the left ventricle  · Left ventricular diastolic dysfunction (grade I) consistent with impaired relaxation.  · Right ventricular cavity is mildly dilated.  · Left atrial cavity size is moderately dilated.  · Mild mitral valve regurgitation is present  · Mild-to-moderate pulmonic valve regurgitation is present.    Transthoracic echocardiogram: 12/02/2016      Transthoracic echocardiogram: 09/29/2016      Left heart catheterization: 07/17/2011  HEMODYNAMICS: Aortic pressure 152/81 Mean 110 Left ventricular pressure 152/18   LEFT  VENTRICULOGRAM: Shows mild left ventricular enlargement with overall preserved left ventricular systolic function with estimated ejection fraction of about 55%.   CORONARY ARTERIES: Right dominant system   LEFT MAIN: Free of any disease.   LAD: Medium caliber vessel which in the proximal portion had evidence of luminal irregularity with good MARQUISE 3 flow. The diagonal branch had minimal plaquing with evidence of good MARQUISE 3 flow.   CX: Medium caliber vessel which gave out a medium caliber ramus intermedius branch which was free of any obstructive stenosis. The circumflex artery after the origin of the ramus intermedius branch was free of any of obstructive stenosis with good MARQUISE 3 flow.   RCA: Medium caliber dominant vessel which was free of any obstructive stenosis with good MARQUISE 3 flow.   FINAL DX:   1. No evidence of any obstructive epicardial coronary artery disease.   2. Preserved left ventricular systolic function with estimated ejection fraction of about 55%.   LEONID GREER M.D.       Lab Results   Component Value Date    GLUCOSE 97 09/16/2020    BUN 10 09/16/2020    CREATININE 1.43 (H) 09/16/2020    EGFRIFAFRI 62 09/16/2020    BCR 7.0 09/16/2020    K 4.0 09/16/2020    CO2 26.6 09/16/2020    CALCIUM 9.5 09/16/2020    ALBUMIN 4.20 09/16/2020    AST 21 09/16/2020    ALT 21 09/16/2020     Lab Results   Component Value Date    WBC 6.61 09/16/2020    HGB 14.4 09/16/2020    HCT 42.0 09/16/2020    MCV 89.6 09/16/2020     09/16/2020     Lab Results   Component Value Date    CHOL 123 09/16/2020    TRIG 76 09/16/2020    HDL 28 (L) 09/16/2020    LDL 80 09/16/2020     Lab Results   Component Value Date    HGBA1C 5.9 (H) 05/02/2018     Lab Results   Component Value Date    TSH 1.120 05/02/2018      Ref. Range 7/14/2017 13:50 5/2/2018 15:42   proBNP Latest Ref Range: 0.0 - 900.0 pg/mL 229.0 2,620.0 (H)      Ref. Range 9/16/2020 10:22   proBNP Latest Ref Range: 0.0 - 900.0 pg/mL 2,198.0 (H)     The following  portions of the patient's history were reviewed and updated as appropriate: allergies, current medications, past family history, past medical history, past social history, past surgical history and problem list.  Old records reviewed and pertinent information is included in the above objective data.      Assessment/Plan:      Diagnosis Plan   1. Chronic systolic CHF (congestive heart failure) without clinical evidence of hypervolemia; he is well perfused [facility]     AHA Stage C ; NYHA Class II   LVEF: 20%  BETA-BLOCKER: Carvedilol 25 mg twice daily  CORLANOR: 5mg BID. HR improved.   ACE/ARB: Lisinopril 40 mg daily- discontinued by another provider  ENTRESTO: N/A  DIURETIC: Lasix to 40 mg daily PRN. Has had a few doses without improvement in   ALDOSTERONT ANTAGONIST: Spironolactone 25mg daily  IMDUR/HYDRALAZINE: Hydralazine 25mg/Imdur 30mg  DIGOXIN: discontinued by another provider.  Fluid restriction: 1.5 L daily  Sodium restriction: 2000 mg daily   6MWT: 06/05/2018   Cardiac Rehab: N/A  Pulmonary Rehab: N/A  ICD: indicated, but deferring due to his job.   CardioMEMS: N/A  Advanced HF evaluation (Transplant/LVAD): N/A@this time    Recommended daily weight monitoring.  Discussed patient action plan for heart failure.  Recommended avoiding NSAIDs use.  Discussed warning signs requiring additional medical attention for heart failure.    Activity: Approximately 150 minutes of moderate activity (such as walking program)  per week (20-30 minutes most days of the week)  Diet: Heart healthy foods - more fresh fruits and vegetables and whole grains; less red meat; more fish and poultry that is baked or grilled - not fried; less salt not to exceed 2000 mg daily - less processed food; No trans or saturated fat  Non Smoker  Diabetes management  Blood pressure management  Weight management: Patient's Body mass index is 40.14 kg/m². BMI is above normal parameters. Recommendations include: exercise counseling and nutrition  counseling.  Stress management       2. CHILEL Still present and not worse. Likely from CHF versus lung pathology. Has not had PFTs or CXR.            Follow up: 3 months.           This document has been electronically signed by ANNA MARIE Lantigua on May 24, 2021 10:41 CDT

## 2021-05-27 ENCOUNTER — HOSPITAL ENCOUNTER (OUTPATIENT)
Dept: MRI IMAGING | Facility: HOSPITAL | Age: 56
Discharge: HOME | End: 2021-05-27
Admitting: ORTHOPAEDIC SURGERY

## 2021-05-27 DIAGNOSIS — M25.561 RIGHT KNEE PAIN, UNSPECIFIED CHRONICITY: ICD-10-CM

## 2021-05-27 DIAGNOSIS — M23.91 INTERNAL DERANGEMENT OF RIGHT KNEE: ICD-10-CM

## 2021-05-27 PROCEDURE — 73721 MRI JNT OF LWR EXTRE W/O DYE: CPT

## 2021-06-10 ENCOUNTER — OFFICE VISIT (OUTPATIENT)
Dept: ORTHOPEDIC SURGERY | Facility: CLINIC | Age: 56
End: 2021-06-10

## 2021-06-10 VITALS
OXYGEN SATURATION: 97 % | BODY MASS INDEX: 39.96 KG/M2 | HEART RATE: 75 BPM | HEIGHT: 72 IN | DIASTOLIC BLOOD PRESSURE: 72 MMHG | SYSTOLIC BLOOD PRESSURE: 120 MMHG | WEIGHT: 295 LBS

## 2021-06-10 DIAGNOSIS — E11.9 TYPE 2 DIABETES MELLITUS WITHOUT COMPLICATION, WITHOUT LONG-TERM CURRENT USE OF INSULIN (HCC): ICD-10-CM

## 2021-06-10 DIAGNOSIS — M17.11 PRIMARY OSTEOARTHRITIS OF RIGHT KNEE: Primary | ICD-10-CM

## 2021-06-10 DIAGNOSIS — M25.561 RIGHT KNEE PAIN, UNSPECIFIED CHRONICITY: ICD-10-CM

## 2021-06-10 DIAGNOSIS — I42.0 DILATED CARDIOMYOPATHY (HCC): ICD-10-CM

## 2021-06-10 DIAGNOSIS — M23.91 INTERNAL DERANGEMENT OF RIGHT KNEE: ICD-10-CM

## 2021-06-10 DIAGNOSIS — E66.01 MORBID OBESITY WITH BMI OF 40.0-44.9, ADULT (HCC): ICD-10-CM

## 2021-06-10 DIAGNOSIS — I10 ESSENTIAL HYPERTENSION: ICD-10-CM

## 2021-06-10 DIAGNOSIS — I50.23 ACUTE ON CHRONIC SYSTOLIC CHF (CONGESTIVE HEART FAILURE) (HCC): ICD-10-CM

## 2021-06-10 PROCEDURE — 20610 DRAIN/INJ JOINT/BURSA W/O US: CPT | Performed by: ORTHOPAEDIC SURGERY

## 2021-06-10 PROCEDURE — 99213 OFFICE O/P EST LOW 20 MIN: CPT | Performed by: ORTHOPAEDIC SURGERY

## 2021-06-10 RX ADMIN — TRIAMCINOLONE ACETONIDE 40 MG: 40 INJECTION, SUSPENSION INTRA-ARTICULAR; INTRAMUSCULAR at 15:24

## 2021-06-10 RX ADMIN — LIDOCAINE HYDROCHLORIDE 2 ML: 10 INJECTION, SOLUTION INFILTRATION; PERINEURAL at 15:24

## 2021-06-10 NOTE — PROGRESS NOTES
"Collin eTresa is a 56 y.o. male returns for     Chief Complaint   Patient presents with   • Right Knee - Follow-up   • Results      05/27/21  MRI Knee Right Without Contrast      MRI @ [facility]  HISTORY OF PRESENT ILLNESS:  He continues to have pain on the medial aspect of his knee.  He has some pain with pivoting and twisting.  Mostly it is a dull ache but he has some occasional sharp stabbing pains also.     CONCURRENT MEDICAL HISTORY:    The following portions of the patient's history were reviewed and updated as appropriate: allergies, current medications, past family history, past medical history, past social history, past surgical history and problem list.     ROS  No fevers or chills.  No chest pain or shortness of air.  No GI or  disturbances.    PHYSICAL EXAMINATION:       /72   Pulse 75   Ht 182.9 cm (72\")   Wt 134 kg (295 lb)   SpO2 97%   BMI 40.01 kg/m²     Physical Exam  Constitutional:       General: He is not in acute distress.     Appearance: Normal appearance.   Pulmonary:      Effort: Pulmonary effort is normal. No respiratory distress.   Musculoskeletal:      Right knee:      Instability Tests: Medial Fernando test positive.   Neurological:      Mental Status: He is alert and oriented to person, place, and time.         GAIT:     []  Normal  [x]  Antalgic    Assistive device: [x]  None  []  Walker     []  Crutches  []  Cane     []  Wheelchair  []  Stretcher    Right Knee Exam     Muscle Strength   The patient has normal right knee strength.    Tenderness   The patient is experiencing tenderness in the medial joint line and medial retinaculum.    Range of Motion   Extension: -5   Flexion: 110     Tests   Fernando:  Medial - positive   Varus: negative Valgus: negative    Other   Erythema: absent  Sensation: normal  Pulse: present              XR Knee 1 or 2 View Right    Result Date: 5/18/2021  Narrative: Ordering Provider:  Román Cazares MD Ordering Diagnosis/Indication:  " Right knee pain, unspecified chronicity Procedure:  XR KNEE 1 OR 2 VW RIGHT Exam Date:  5/18/21 COMPARISON:  Not applicable, no relevant images available.     Impression:  AP bilateral standing of the knees with lateral of the right knee show acceptable position alignment of both knees with no evidence of acute bony normality.  Mild medial joint space narrowing is noted in both knees.  Relatively smooth articular surface is noted in each knee as well.  Minimal arthritic change noted in the patellofemoral compartment of the right knee.  No acute findings. Román Cazares MD 5/18/21     XR Knee Bilateral AP Standing    Result Date: 5/18/2021  Narrative: Ordering Provider:  Román Cazares MD Ordering Diagnosis/Indication:  Acute pain of left knee Procedure:  XR KNEE BILATERAL AP STANDING Exam Date:  5/18/21 COMPARISON:  Not applicable, no relevant images available.     Impression:  AP bilateral standing of the knees with lateral of the right knee show acceptable position alignment of both knees with no evidence of acute bony normality.  Mild medial joint space narrowing is noted in both knees.  Relatively smooth articular surface is noted in each knee as well.  Minimal arthritic change noted in the patellofemoral compartment of the right knee.  No acute findings. Román Cazares MD 5/18/21     MRI Knee Right Without Contrast    Result Date: 5/28/2021  Narrative: MRI of the right knee without contrast HISTORY: Right knee pain. Pain x1 month. No known injury. Medial pain. Multisequence multiplanar images of the right knee were obtained without contrast. COMPARISON: None. Correlation with radiographs of May 18, 2021. FINDINGS: Marked narrowing of the medial joint space. Degenerative signal change in each meniscus. No definite meniscal tear. Small bone bruise peripheral ventral aspect of the medial tibial plateau. Possible partial tear of the anterior cruciate ligament. Grade IV chondromalacia  patella lower pole of the patella. Small to moderate-sized joint effusion. Circumferential subcutaneous edema. The posterior cruciate ligament, collateral ligaments, quadriceps tendon and patellar tendon are intact.     Impression: CONCLUSION: Marked narrowing of the medial joint space. Small bone bruise peripheral ventral aspect of the medial tibial plateau. Possible partial tear of the anterior cruciate ligament. Grade IV chondromalacia patella lower pole of the patella. Small to moderate-sized joint effusion. Circumferential subcutaneous edema. [id number] Electronically signed by:  Shahid Andrews MD  5/28/2021 7:46 PM CDT Workstation: [employer]            ASSESSMENT:    Diagnoses and all orders for this visit:    Primary osteoarthritis of right knee  -     Miscellaneous DME  -     Large Joint Arthrocentesis: R knee    Right knee pain, unspecified chronicity  -     Miscellaneous DME  -     Large Joint Arthrocentesis: R knee    Internal derangement of right knee  -     Miscellaneous DME  -     Large Joint Arthrocentesis: R knee    Dilated cardiomyopathy (CMS/HCC)    Acute on chronic systolic CHF (congestive heart failure) (CMS/HCC)    Essential hypertension    Type 2 diabetes mellitus without complication, without long-term current use of insulin (CMS/[facility])    Morbid obesity with BMI of 40.0-44.9, adult (CMS/[facility])          PLAN    The MRI and results were reviewed with the patient.  He does have significant arthritic change in the medial compartment of his knee.  He also has some bone bruising involving the medial femoral condyle and medial tibial plateau.  He has chondromalacia involving the patella as well.    We discussed protected weightbearing for short period time to help with the bone bruising.    We also discussed proceeding with a steroid injection today.    Slowly progress activity as tolerated.    We discussed beginning the treatment algorithm for osteoarthritis of the right knee.    Large Joint  Arthrocentesis: R knee  Date/Time: 6/10/2021 3:24 PM  Consent given by: patient  Site marked: site marked  Timeout: Immediately prior to procedure a time out was called to verify the correct patient, procedure, equipment, support staff and site/side marked as required   Supporting Documentation  Indications: pain   Procedure Details  Location: knee - R knee  Preparation: Patient was prepped and draped in the usual sterile fashion  Needle size: 22 G  Approach: anteromedial  Medications administered: 40 mg triamcinolone acetonide 40 MG/ML; 2 mL lidocaine 1 %  Patient tolerance: patient tolerated the procedure well with no immediate complications          Return in about 8 weeks (around 8/5/2021) for recheck.    Román Cazares MD

## 2021-06-12 RX ORDER — LIDOCAINE HYDROCHLORIDE 10 MG/ML
2 INJECTION, SOLUTION INFILTRATION; PERINEURAL
Status: COMPLETED | OUTPATIENT
Start: 2021-06-10 | End: 2021-06-10

## 2021-06-12 RX ORDER — TRIAMCINOLONE ACETONIDE 40 MG/ML
40 INJECTION, SUSPENSION INTRA-ARTICULAR; INTRAMUSCULAR
Status: COMPLETED | OUTPATIENT
Start: 2021-06-10 | End: 2021-06-10

## 2021-07-15 ENCOUNTER — OFFICE VISIT (OUTPATIENT)
Dept: ORTHOPEDIC SURGERY | Facility: CLINIC | Age: 56
End: 2021-07-15

## 2021-07-15 VITALS
SYSTOLIC BLOOD PRESSURE: 120 MMHG | DIASTOLIC BLOOD PRESSURE: 82 MMHG | OXYGEN SATURATION: 96 % | BODY MASS INDEX: 42.19 KG/M2 | HEART RATE: 81 BPM | HEIGHT: 72 IN | WEIGHT: 311.5 LBS

## 2021-07-15 DIAGNOSIS — E11.9 TYPE 2 DIABETES MELLITUS WITHOUT COMPLICATION, WITHOUT LONG-TERM CURRENT USE OF INSULIN (HCC): ICD-10-CM

## 2021-07-15 DIAGNOSIS — M17.11 PRIMARY OSTEOARTHRITIS OF RIGHT KNEE: Primary | ICD-10-CM

## 2021-07-15 DIAGNOSIS — M23.91 INTERNAL DERANGEMENT OF RIGHT KNEE: ICD-10-CM

## 2021-07-15 DIAGNOSIS — I10 ESSENTIAL HYPERTENSION: ICD-10-CM

## 2021-07-15 DIAGNOSIS — T14.8XXA BONE BRUISE: ICD-10-CM

## 2021-07-15 DIAGNOSIS — E66.01 MORBID OBESITY WITH BMI OF 40.0-44.9, ADULT (HCC): ICD-10-CM

## 2021-07-15 PROCEDURE — 99213 OFFICE O/P EST LOW 20 MIN: CPT | Performed by: ORTHOPAEDIC SURGERY

## 2021-07-15 NOTE — PROGRESS NOTES
"Collin Teresa is a 56 y.o. male returns for     Chief Complaint   Patient presents with   • Right Knee - Follow-up       HISTORY OF PRESENT ILLNESS:  Follow up Right knee pain.  Steroid injection on 6/10/21.  Patient reports improvement for about 2 weeks.  Continues using cane for supprot.  He reports that he continues having pain even though he is walking with a cane.  Continues to have difficulty with activity.  He has been unable to return to work.     CONCURRENT MEDICAL HISTORY:    The following portions of the patient's history were reviewed and updated as appropriate: allergies, current medications, past family history, past medical history, past social history, past surgical history and problem list.     ROS  No fevers or chills.  No chest pain or shortness of air.  No GI or  disturbances.    PHYSICAL EXAMINATION:       /82   Pulse 81   Ht 182.9 cm (72\")   Wt (!) 141 kg (311 lb 8 oz)   SpO2 96%   BMI 42.25 kg/m²     Physical Exam  Constitutional:       General: He is not in acute distress.     Appearance: Normal appearance.   Pulmonary:      Effort: Pulmonary effort is normal. No respiratory distress.   Musculoskeletal:      Right knee:      Instability Tests: Medial Fernando test positive.   Neurological:      Mental Status: He is alert and oriented to person, place, and time.         GAIT:     []  Normal  [x]  Antalgic    Assistive device: [x]  None  []  Walker     []  Crutches  []  Cane     []  Wheelchair  []  Stretcher    Right Knee Exam     Muscle Strength   The patient has normal right knee strength.    Tenderness   The patient is experiencing tenderness in the medial joint line and medial retinaculum.    Range of Motion   Extension: -5   Flexion: 110     Tests   Fernando:  Medial - positive   Varus: negative Valgus: negative    Other   Erythema: absent  Sensation: normal  Pulse: present                        ASSESSMENT:    Diagnoses and all orders for this visit:    Primary " osteoarthritis of right knee    Internal derangement of right knee    Type 2 diabetes mellitus without complication, without long-term current use of insulin (CMS/[facility])    Essential hypertension    Morbid obesity with BMI of 40.0-44.9, adult (CMS/[facility])    Bone bruise          PLAN    He was strongly encouraged to decrease weightbearing and to utilize protected weightbearing with a cane or a walker.  We discussed that he had a sudden onset of acute symptoms more consistent with bone bruising or meniscal tear.  However, his MRI and x-rays show more advanced arthritic change as well.  I am not confident that a knee scope will help his situation.  However, I also do not feel moving directly to total knee arthroplasty is the best answer either.  I strongly encouraged continued protected weightbearing and see how his symptoms improve for another 4 weeks.  Definitive treatment will be discussed at the next visit.  He will continue off work unless he calls that he can return because his symptoms have improved..    Return in about 4 weeks (around 8/12/2021) for recheck.    Román Cazares MD

## 2021-08-02 RX ORDER — SPIRONOLACTONE 25 MG/1
25 TABLET ORAL DAILY
Qty: 90 TABLET | Refills: 3 | Status: SHIPPED | OUTPATIENT
Start: 2021-08-02 | End: 2022-08-23 | Stop reason: SDUPTHER

## 2021-08-17 ENCOUNTER — OFFICE VISIT (OUTPATIENT)
Dept: ORTHOPEDIC SURGERY | Facility: CLINIC | Age: 56
End: 2021-08-17

## 2021-08-17 VITALS
BODY MASS INDEX: 42.12 KG/M2 | SYSTOLIC BLOOD PRESSURE: 124 MMHG | DIASTOLIC BLOOD PRESSURE: 86 MMHG | HEIGHT: 72 IN | HEART RATE: 77 BPM | OXYGEN SATURATION: 97 % | RESPIRATION RATE: 20 BRPM | WEIGHT: 311 LBS

## 2021-08-17 DIAGNOSIS — E11.9 TYPE 2 DIABETES MELLITUS WITHOUT COMPLICATION, WITHOUT LONG-TERM CURRENT USE OF INSULIN (HCC): ICD-10-CM

## 2021-08-17 DIAGNOSIS — M23.91 INTERNAL DERANGEMENT OF RIGHT KNEE: Primary | ICD-10-CM

## 2021-08-17 DIAGNOSIS — I10 ESSENTIAL HYPERTENSION: ICD-10-CM

## 2021-08-17 DIAGNOSIS — M17.11 PRIMARY OSTEOARTHRITIS OF RIGHT KNEE: ICD-10-CM

## 2021-08-17 PROCEDURE — 99213 OFFICE O/P EST LOW 20 MIN: CPT | Performed by: ORTHOPAEDIC SURGERY

## 2021-08-17 PROCEDURE — 20610 DRAIN/INJ JOINT/BURSA W/O US: CPT | Performed by: ORTHOPAEDIC SURGERY

## 2021-08-17 RX ADMIN — TRIAMCINOLONE ACETONIDE 40 MG: 40 INJECTION, SUSPENSION INTRA-ARTICULAR; INTRAMUSCULAR at 13:20

## 2021-08-17 RX ADMIN — LIDOCAINE HYDROCHLORIDE 2 ML: 10 INJECTION, SOLUTION INFILTRATION; PERINEURAL at 13:20

## 2021-08-17 NOTE — PROGRESS NOTES
"Collin Teresa is a 56 y.o. male returns for     Chief Complaint   Patient presents with   • Right Knee - Follow-up        HISTORY OF PRESENT ILLNESS:  Patient in for follow up on right knee pain.   Patient reports, his worst pain is around 4am-5am.   He states, he is doing better, but is having swelling from his knee down to his ankle, at times.    He is thinking about the possibility of returning to work in the next couple of weeks.  He is slowly regaining strength and mobility.      CONCURRENT MEDICAL HISTORY:    The following portions of the patient's history were reviewed and updated as appropriate: allergies, current medications, past family history, past medical history, past social history, past surgical history and problem list.     ROS  No fevers or chills.  No chest pain or shortness of air.  No GI or  disturbances.    PHYSICAL EXAMINATION:       /86   Pulse 77   Resp 20   Ht 182.9 cm (72\")   Wt (!) 141 kg (311 lb)   SpO2 97%   BMI 42.18 kg/m²     Physical Exam  Constitutional:       General: He is not in acute distress.     Appearance: Normal appearance.   Pulmonary:      Effort: Pulmonary effort is normal. No respiratory distress.   Musculoskeletal:      Right knee:      Instability Tests: Medial Fernando test positive.   Neurological:      Mental Status: He is alert and oriented to person, place, and time.         GAIT:     []  Normal  [x]  Antalgic    Assistive device: []  None  []  Walker     []  Crutches  [x]  Cane     []  Wheelchair  []  Stretcher    Right Knee Exam     Muscle Strength   The patient has normal right knee strength.    Tenderness   The patient is experiencing tenderness in the medial joint line and medial retinaculum.    Range of Motion   Extension: -5   Flexion: 110     Tests   Fernando:  Medial - positive   Varus: negative Valgus: negative    Other   Erythema: absent  Sensation: normal  Pulse: present                      ASSESSMENT:    Diagnoses and all orders " for this visit:    Internal derangement of right knee  -     Large Joint Arthrocentesis: R knee    Primary osteoarthritis of right knee  -     Large Joint Arthrocentesis: R knee    Essential hypertension    Type 2 diabetes mellitus without complication, without long-term current use of insulin (CMS/[facility])        Large Joint Arthrocentesis: R knee  Date/Time: 8/17/2021 1:20 PM  Consent given by: patient  Site marked: site marked  Timeout: Immediately prior to procedure a time out was called to verify the correct patient, procedure, equipment, support staff and site/side marked as required   Supporting Documentation  Indications: pain   Procedure Details  Location: knee - R knee  Preparation: Patient was prepped and draped in the usual sterile fashion  Needle size: 22 G  Approach: anteromedial  Medications administered: 40 mg triamcinolone acetonide 40 MG/ML; 2 mL lidocaine 1 %  Patient tolerance: patient tolerated the procedure well with no immediate complications          PLAN    Long discussion was had with the patient regarding her further treatment options.  We discussed his swelling in the right leg.  He was encouraged to work with swelling control involving ice and elevation.  The x-ray and MRI were reviewed with the patient.  We discussed general strength and conditioning exercises and slowly increasing activity as pain allows.  We discussed proceeding with steroid injection today seeing how he responds.  Possibility of returning to work and possibility of surgical intervention depending on how he responds to the steroid injection.  He was strongly encouraged to continue with general weight management and healthy lifestyle choices so that his risk would be minimal if and when we get to the point of definitive surgical intervention.      Return in about 6 weeks (around 9/28/2021) for recheck.    Román Cazares MD

## 2021-08-19 RX ORDER — LIDOCAINE HYDROCHLORIDE 10 MG/ML
2 INJECTION, SOLUTION INFILTRATION; PERINEURAL
Status: COMPLETED | OUTPATIENT
Start: 2021-08-17 | End: 2021-08-17

## 2021-08-19 RX ORDER — TRIAMCINOLONE ACETONIDE 40 MG/ML
40 INJECTION, SUSPENSION INTRA-ARTICULAR; INTRAMUSCULAR
Status: COMPLETED | OUTPATIENT
Start: 2021-08-17 | End: 2021-08-17

## 2021-08-31 ENCOUNTER — OFFICE VISIT (OUTPATIENT)
Dept: CARDIOLOGY | Facility: CLINIC | Age: 56
End: 2021-08-31

## 2021-08-31 VITALS
TEMPERATURE: 98.5 F | SYSTOLIC BLOOD PRESSURE: 140 MMHG | OXYGEN SATURATION: 94 % | DIASTOLIC BLOOD PRESSURE: 90 MMHG | WEIGHT: 315 LBS | HEIGHT: 72 IN | BODY MASS INDEX: 42.66 KG/M2 | HEART RATE: 88 BPM

## 2021-08-31 DIAGNOSIS — I42.0 DILATED CARDIOMYOPATHY (HCC): Primary | ICD-10-CM

## 2021-08-31 LAB
QT INTERVAL: 400 MS
QTC INTERVAL: 484 MS

## 2021-08-31 PROCEDURE — 93000 ELECTROCARDIOGRAM COMPLETE: CPT | Performed by: INTERNAL MEDICINE

## 2021-08-31 PROCEDURE — 99214 OFFICE O/P EST MOD 30 MIN: CPT | Performed by: NURSE PRACTITIONER

## 2021-08-31 RX ORDER — PROMETHAZINE HYDROCHLORIDE 12.5 MG/1
12.5 TABLET ORAL EVERY 6 HOURS PRN
Qty: 30 TABLET | Refills: 3 | Status: SHIPPED | OUTPATIENT
Start: 2021-08-31

## 2021-08-31 RX ORDER — HYDRALAZINE HYDROCHLORIDE 25 MG/1
25 TABLET, FILM COATED ORAL 3 TIMES DAILY
Qty: 270 TABLET | Refills: 2 | Status: SHIPPED | OUTPATIENT
Start: 2021-08-31

## 2021-08-31 RX ORDER — BROMPHENIRAMINE MALEATE, PSEUDOEPHEDRINE HYDROCHLORIDE, AND DEXTROMETHORPHAN HYDROBROMIDE 2; 30; 10 MG/5ML; MG/5ML; MG/5ML
2.5 SYRUP ORAL 4 TIMES DAILY PRN
Qty: 473 ML | Refills: 3 | Status: SHIPPED | OUTPATIENT
Start: 2021-08-31 | End: 2022-01-20 | Stop reason: SDUPTHER

## 2021-08-31 RX ORDER — ISOSORBIDE MONONITRATE 30 MG/1
30 TABLET, EXTENDED RELEASE ORAL DAILY
Qty: 90 TABLET | Refills: 3 | Status: SHIPPED | OUTPATIENT
Start: 2021-08-31 | End: 2022-10-04

## 2021-08-31 NOTE — PROGRESS NOTES
Subjective:     Chief Complaint   Patient presents with   • Cardiomyopathy     chief complaint     Congestive Heart Failure  Presents for follow-up visit. The disease course has been worsening. Pertinent negatives include no abdominal pain, chest pain, claudication, edema, fatigue, orthopnea, palpitations, paroxysmal nocturnal dyspnea, shortness of breath or unexpected weight change. The symptoms have been improving. Past treatments include ACE inhibitors, aldosterone receptor blockers, beta blockers, digoxin and salt and fluid restriction. The treatment provided moderate relief. Compliance with prior treatments has been good. His past medical history is significant for DM and HTN. There is no history of CAD, chronic lung disease, hyperthyroidism or valvular heart disease. Compliance with total regimen is 51-75%. Compliance problems include adherence to exercise.  Compliance with diet is %. Compliance with exercise is 51-75%. Compliance with medications is %.     The patient is a 53-year-old gentleman with history of nonischemic cardiomyopathy, undergoing cardiac catheterization in 2011.  The patient has been on optimal medical therapy since that time.  In approximately December 2016, transthoracic echocardiogram demonstrated improvement in LVEF to 50%.  Dr. Sharma subsequently decreased Lasix to 40 mg daily.    On 05/02/2018, the patient presented for office evaluation with Dr. Sharma at which time he was found to have clinical evidence of hypervolemia thus receiving in office subcutaneous Lasix and ejection.   In addition, diuretic therapy was increased to Lasix 40 mg twice a day.    On 05/04/2018, the patient presented for evaluation with ANNA MARIE Post, thus receiving additional 40 mg subcutaneous Lasix, along with continuation of Lasix 40 mg twice a day oral.    On 05/09/2018 the patient presented back for reevaluation with ANNA MARIE Post at which time he presented euvolemic.  Lasix 40 mg  twice daily was continued with plans for follow-up today.    06/05/2018  The patient is reporting improvement in his overall dietary choices (compliance with dietary sodium restriction); compliance with fluid restrictions of 1 L per day; compliance with medications as prescribed.    6/10/19: 1 year followup. Has not been seen since last year. Reports medications are affecting his sex life (decreased libido, decreased ability to achieve erection). Patient appears euvolemic and without symptoms of fluid overload including edema, SOA, fatigue.     3/16/20: Mr. Teresa returns today after long interval. He does not keep regularly scheduled follow ups but calls when he wants to be seen. He has lost 20+lbs. And has been doing well. He is off of most medications now only taking Coreg, digoxin, and Aldactone. Can stop Digoxin. Will repeat echo.     1/29/2021: Mr. Teresa returns today after calling to be seen. He is doing well. Repeated echo last year with ongoing DCM with EF of 21%. He is still taking Coreg, Hydralazine, Imdur, and aldactone. CKD III, we have been cautious with ACE/ARB use.   He is having URI symptoms and frequent visits to PCP for abx and steroids. He has never smoked. He has exposure to rock dust at work. He feels it is beginning to cause him problems. He has not had PFTs. I offered a CXR today. He wants to delay this and get it done the next time he is here. We discussed disability. I brought it up. His DCM will catch up with him eventually, but for now he is maintaining his NYHA Class II symptoms. We discussed his echo. No ICD at this time due to his job.   Will add Corlanor as he is on 25mg BID Coreg and HR is still 90.     5/24/21:  3 month follow up. He has been doing well, still working. He is having knee pain and seeing Ortho. MRI Thursday.   CHILEL continues but is controlled. He has had to modify his work habits to allow for his breathing to recover.   Should he need knee surgery, he will be able to  have that from a Cardiology stand point. He is NYHA class II.   He had called last week to discuss unilateral leg swelling. Looking at it today, the right leg is just slightly larger than left. There is trace pitting edema in left and 1+ in right.   HR is improved with Corlanor. Weight is down.   We discussed disability again. This may be a good opportunity depending on what his MRI shows regarding disability claim. 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     8/31/21: 3 month follow up. Doing well. Breathing is good. BP is slightly elevated today, has not had medications yet.   Awaiting right knee replacement soon. Had injection last week with Dr. Cazares.    Labs soon with PCP.        Review of Systems   Constitutional: Negative for chills, decreased appetite, fatigue, fever and unexpected weight change.   HENT: Negative for congestion and sore throat.    Eyes: Negative.    Cardiovascular: Negative for chest pain, claudication, dyspnea on exertion, irregular heartbeat, leg swelling, orthopnea, palpitations and paroxysmal nocturnal dyspnea.   Respiratory: Negative for cough, shortness of breath and wheezing.    Endocrine: Negative.  Negative for polydipsia, polyphagia and polyuria.   Hematologic/Lymphatic: Negative for bleeding problem. Does not bruise/bleed easily.   Skin: Negative for dry skin, flushing and rash.   Musculoskeletal: Negative for falls and myalgias.   Gastrointestinal: Negative for bloating, abdominal pain, change in bowel habit and melena.   Genitourinary: Negative for frequency and hematuria.   Neurological: Negative for dizziness, light-headedness, loss of balance and weakness.   Psychiatric/Behavioral: Negative for altered mental status and memory loss. The patient is not nervous/anxious.      Current Outpatient Medications   Medication Sig Dispense Refill   • albuterol (PROAIR HFA) 108 (90 Base) MCG/ACT inhaler Inhale 2 puffs Every 4 (Four) Hours As  "Needed for Wheezing.     • albuterol sulfate  (90 Base) MCG/ACT inhaler Inhale 2 puffs Every 4 (Four) Hours As Needed for Wheezing or Shortness of Air. 6.7 g 3   • carvedilol (COREG) 25 MG tablet TAKE ONE TABLET BY MOUTH TWO TIMES A DAY WITH MEALS 180 tablet 3   • furosemide (LASIX) 40 MG tablet Take 1 tablet by mouth Daily As Needed (weight gain of 3lbs overnight or 5lbs in a week.). 90 tablet 3   • hydrALAZINE (APRESOLINE) 25 MG tablet Take 1 tablet by mouth 3 (Three) Times a Day. 270 tablet 2   • ibuprofen (ADVIL,MOTRIN) 800 MG tablet      • isosorbide mononitrate (IMDUR) 30 MG 24 hr tablet Take 1 tablet by mouth Daily. 90 tablet 3   • ivabradine HCl (CORLANOR) 5 MG tablet tablet Take 1 tablet by mouth 2 (Two) Times a Day With Meals. 60 tablet 11   • spironolactone (ALDACTONE) 25 MG tablet TAKE 1 TABLET BY MOUTH DAILY. 90 tablet 3   • brompheniramine-pseudoephedrine-DM 30-2-10 MG/5ML syrup Take 2.5 mL by mouth 4 (Four) Times a Day As Needed for Congestion or Allergies. 473 mL 3   • promethazine (PHENERGAN) 12.5 MG tablet Take 1 tablet by mouth Every 6 (Six) Hours As Needed for Nausea or Vomiting. 30 tablet 3     No current facility-administered medications for this visit.     Objective:     Vitals:    08/31/21 0948   BP: 140/90   BP Location: Left arm   Patient Position: Sitting   Cuff Size: Adult   Pulse: 88   Temp: 98.5 °F (36.9 °C)   SpO2: 94%   Weight: (!) 144 kg (317 lb 12.8 oz)   Height: 182.9 cm (72\")     Wt Readings from Last 3 Encounters:   08/31/21 (!) 144 kg (317 lb 12.8 oz)   08/17/21 (!) 141 kg (311 lb)   07/15/21 (!) 141 kg (311 lb 8 oz)     Physical Exam  Constitutional:       General: He is not in acute distress.     Appearance: He is well-developed.   HENT:      Head: Normocephalic and atraumatic.   Neck:      Vascular: No JVD.   Cardiovascular:      Rate and Rhythm: Normal rate and regular rhythm.      Pulses: Intact distal pulses.      Heart sounds: Normal heart sounds, S1 normal and S2 " normal. No murmur heard.     Pulmonary:      Effort: Pulmonary effort is normal. No respiratory distress.      Breath sounds: Normal breath sounds. No wheezing or rales.   Abdominal:      General: Bowel sounds are normal.      Palpations: Abdomen is soft.   Musculoskeletal:         General: Normal range of motion.      Cervical back: Normal range of motion.      Right lower leg: Edema present.      Left lower leg: Edema present.   Skin:     General: Skin is warm and dry.      Findings: No erythema.   Neurological:      Mental Status: He is alert and oriented to person, place, and time.   Psychiatric:         Behavior: Behavior normal.         Thought Content: Thought content normal.         Judgment: Judgment normal.       Data Reviewed:           Results for orders placed during the hospital encounter of 04/13/20    Adult Transthoracic Echo Complete W/ Cont if Necessary Per Protocol    Interpretation Summary  · Study is technically adequate. Patient refused Definity contrast to evaluate for LV thrombus.  · Left ventricle systolic function is severely decreased at 21-25%. Left ventricular cavity is severely dilated. Restrictive diastolic function.  · Right ventricular systolic function is moderately reduced. Right ventricular cavity is moderately dilated.  · Dilated mitral annulus with moderate to severe mitral regurgitation.  · Dilated tricuspid annulus with at least moderate tricuspid regurgitation. PA systolic pressure is 52 mmHg and consistent with pulmonary hypertension.  · Dilated pulmonic annulus with moderate pulmonary regurgitation.  · Tubular ascending aorta demonstrates ectasia to 3.9 cm. The patient's ASI is 1.4 cm/meter squared.  · Since prior study, there has been interval dilatation of the left ventricle with a reduction in LVEF and worsening valvular regurgitation.      Transthoracic echocardogram: 06/18/2018  · The left ventricular cavity is moderately dilated.  · Left ventricular wall thickness is  consistent with moderate concentric hypertrophy.  · Estimated EF = 33%. Global hypokinesis of the left ventricle  · Left ventricular diastolic dysfunction (grade I) consistent with impaired relaxation.  · Right ventricular cavity is mildly dilated.  · Left atrial cavity size is moderately dilated.  · Mild mitral valve regurgitation is present  · Mild-to-moderate pulmonic valve regurgitation is present.    Transthoracic echocardiogram: 12/02/2016      Transthoracic echocardiogram: 09/29/2016      Left heart catheterization: 07/17/2011  HEMODYNAMICS: Aortic pressure 152/81 Mean 110 Left ventricular pressure 152/18   LEFT VENTRICULOGRAM: Shows mild left ventricular enlargement with overall preserved left ventricular systolic function with estimated ejection fraction of about 55%.   CORONARY ARTERIES: Right dominant system   LEFT MAIN: Free of any disease.   LAD: Medium caliber vessel which in the proximal portion had evidence of luminal irregularity with good MARQUISE 3 flow. The diagonal branch had minimal plaquing with evidence of good MARQUISE 3 flow.   CX: Medium caliber vessel which gave out a medium caliber ramus intermedius branch which was free of any obstructive stenosis. The circumflex artery after the origin of the ramus intermedius branch was free of any of obstructive stenosis with good MARQUISE 3 flow.   RCA: Medium caliber dominant vessel which was free of any obstructive stenosis with good MARQUISE 3 flow.   FINAL DX:   1. No evidence of any obstructive epicardial coronary artery disease.   2. Preserved left ventricular systolic function with estimated ejection fraction of about 55%.   LEONID GREER M.D.       Lab Results   Component Value Date    GLUCOSE 97 09/16/2020    BUN 10 09/16/2020    CREATININE 1.43 (H) 09/16/2020    EGFRIFAFRI 62 09/16/2020    BCR 7.0 09/16/2020    K 4.0 09/16/2020    CO2 26.6 09/16/2020    CALCIUM 9.5 09/16/2020    ALBUMIN 4.20 09/16/2020    AST 21 09/16/2020    ALT 21 09/16/2020      Lab Results   Component Value Date    WBC 6.61 09/16/2020    HGB 14.4 09/16/2020    HCT 42.0 09/16/2020    MCV 89.6 09/16/2020     09/16/2020     Lab Results   Component Value Date    CHOL 123 09/16/2020    TRIG 76 09/16/2020    HDL 28 (L) 09/16/2020    LDL 80 09/16/2020     Lab Results   Component Value Date    HGBA1C 5.9 (H) 05/02/2018     Lab Results   Component Value Date    TSH 1.120 05/02/2018      Ref. Range 7/14/2017 13:50 5/2/2018 15:42   proBNP Latest Ref Range: 0.0 - 900.0 pg/mL 229.0 2,620.0 (H)      Ref. Range 9/16/2020 10:22   proBNP Latest Ref Range: 0.0 - 900.0 pg/mL 2,198.0 (H)     The following portions of the patient's history were reviewed and updated as appropriate: allergies, current medications, past family history, past medical history, past social history, past surgical history and problem list.  Old records reviewed and pertinent information is included in the above objective data.      Assessment/Plan:      Diagnosis Plan   1. Chronic systolic CHF (congestive heart failure) without clinical evidence of hypervolemia; he is well perfused [facility]    AHA Stage C ; NYHA Class II   LVEF: 20%  BETA-BLOCKER: Carvedilol 25 mg twice daily  CORLANOR: 5mg BID. HR improved.   ACE/ARB: Lisinopril 40 mg daily- discontinued by another provider  ENTRESTO: N/A  DIURETIC: Lasix to 40 mg daily PRN. Take about twice weekly.   ALDOSTERONT ANTAGONIST: Spironolactone 25mg daily  IMDUR/HYDRALAZINE: Hydralazine 25mg/Imdur 30mg  DIGOXIN: discontinued by another provider.  Fluid restriction: 1.5 L daily  Sodium restriction: 2000 mg daily   6MWT: 06/05/2018   Cardiac Rehab: N/A  Pulmonary Rehab: N/A  ICD: indicated, but deferring due to his job.   CardioMEMS: N/A  Advanced HF evaluation (Transplant/LVAD): N/A@this time    Recommended daily weight monitoring.  Discussed patient action plan for heart failure.  Recommended avoiding NSAIDs use.  Discussed warning signs requiring additional medical attention for  heart failure.    Activity: Approximately 150 minutes of moderate activity (such as walking program)  per week (20-30 minutes most days of the week)  Diet: Heart healthy foods - more fresh fruits and vegetables and whole grains; less red meat; more fish and poultry that is baked or grilled - not fried; less salt not to exceed 2000 mg daily - less processed food; No trans or saturated fat  Non Smoker  Diabetes management  Blood pressure management  Weight management: Patient's Body mass index is 43.1 kg/m². BMI is above normal parameters. Recommendations include: exercise counseling and nutrition counseling.  Stress management    1-2+ pitting edema today.        2. CHILEL Still present and not worse. Likely from CHF versus lung pathology. Has not had PFTs or CXR.   Walked a long way in today.     Treated recently with sinusitis. Negative Covid. Will give him Bromphed and phenergan versus phen/codeine cough syrup.        3. Preop Evaluation Pre-Op Evaluation Assessment  56 y.o. male with planned surgery right knee replacement.     Known risk factors for perioperative complications: Congestive heart failure.      Cardiac Risk Estimation: per the Revised Cardiac Risk Index (Circ. 100:1043, 1999), the patient's risk factors for cardiac complications include history of congestive heart failure, putting him in: RCI RISK CLASS II (1 risk factor, risk of major cardiac compl. appr. 1.3%).     Pre-Op Evaluation Plan  1. Preoperative workup as follows none.  2. Change in medication regimen before surgery: none, continue medication regimen including morning of surgery, with sip of water.  3. Prophylaxis for cardiac events with perioperative beta-blockers: not indicated.  4. Invasive hemodynamic monitoring perioperatively: not indicated.  5. Deep vein thrombosis prophylaxis:regimen to be chosen by surgical team.      Cardiovascular Risk Estimates provided by the RCRI are provisional and no guarantee of outcome. [facility] does  "not provide \"surgical clearance,\" but only provides a risk assessment and management options.  The final decision for operative intervention is at the discretion of the operating physician.    Various activity scales provide the clinician with a set of questions to determine a patient's functional capacity. Indicators of functional status include the following:  ?Can take care of self, such as eat, dress, or use the toilet (1 MET)  ?Can walk up a flight of steps or a hill or walk on level ground at 3 to 4 mph (4 METs)  ?Can do heavy work around the house, such as scrubbing floors or lifting or moving heavy furniture, or climb two flights of stairs (between 4 and 10 METs)  ?Can participate in strenuous sports such as swimming, singles tennis, football, basketball, and skiing (>10 METs)         Follow up: 3 months.           This document has been electronically signed by ANNA MARIE Lantigua on August 31, 2021 10:06 CDT        "

## 2021-09-21 ENCOUNTER — OFFICE VISIT (OUTPATIENT)
Dept: ORTHOPEDIC SURGERY | Facility: CLINIC | Age: 56
End: 2021-09-21

## 2021-09-21 VITALS — WEIGHT: 315 LBS | HEIGHT: 72 IN | BODY MASS INDEX: 42.66 KG/M2

## 2021-09-21 DIAGNOSIS — I42.0 DILATED CARDIOMYOPATHY (HCC): ICD-10-CM

## 2021-09-21 DIAGNOSIS — E11.9 TYPE 2 DIABETES MELLITUS WITHOUT COMPLICATION, WITHOUT LONG-TERM CURRENT USE OF INSULIN (HCC): ICD-10-CM

## 2021-09-21 DIAGNOSIS — M23.91 INTERNAL DERANGEMENT OF RIGHT KNEE: ICD-10-CM

## 2021-09-21 DIAGNOSIS — I10 ESSENTIAL HYPERTENSION: ICD-10-CM

## 2021-09-21 DIAGNOSIS — E66.01 MORBID OBESITY WITH BMI OF 40.0-44.9, ADULT (HCC): ICD-10-CM

## 2021-09-21 DIAGNOSIS — M17.11 PRIMARY OSTEOARTHRITIS OF RIGHT KNEE: Primary | ICD-10-CM

## 2021-09-21 PROCEDURE — 99213 OFFICE O/P EST LOW 20 MIN: CPT | Performed by: ORTHOPAEDIC SURGERY

## 2021-09-21 NOTE — PROGRESS NOTES
"Collin Teresa is a 56 y.o. male returns for     Chief Complaint   Patient presents with   • Right Knee - Follow-up       HISTORY OF PRESENT ILLNESS:  Patient in for follow up of right knee pain  He is in to be released to try and go back to work.   Pain is slowly improving.  He is mobilizing better.  He wants to try to return to work.       CONCURRENT MEDICAL HISTORY:    The following portions of the patient's history were reviewed and updated as appropriate: allergies, current medications, past family history, past medical history, past social history, past surgical history and problem list.     ROS  No fevers or chills.  No chest pain or shortness of air.  No GI or  disturbances.    PHYSICAL EXAMINATION:       Ht 182.9 cm (72\")   Wt (!) 144 kg (317 lb)   BMI 42.99 kg/m²     Physical Exam  Constitutional:       General: He is not in acute distress.     Appearance: Normal appearance.   Pulmonary:      Effort: Pulmonary effort is normal. No respiratory distress.   Musculoskeletal:      Right knee:      Instability Tests: Medial Fernando test positive.   Neurological:      Mental Status: He is alert and oriented to person, place, and time.         GAIT:     []  Normal  [x]  Antalgic    Assistive device: [x]  None  []  Walker     []  Crutches  []  Cane     []  Wheelchair  []  Stretcher    Right Knee Exam     Muscle Strength   The patient has normal right knee strength.    Tenderness   The patient is experiencing tenderness in the medial joint line and medial retinaculum.    Range of Motion   Extension: -5   Flexion: 110     Tests   Fernando:  Medial - positive   Varus: negative Valgus: negative    Other   Erythema: absent  Sensation: normal  Pulse: present  Swelling: mild                      ASSESSMENT:    Diagnoses and all orders for this visit:    Primary osteoarthritis of right knee    Internal derangement of right knee    Type 2 diabetes mellitus without complication, without long-term current use of " insulin (HCC)    Essential hypertension    Dilated cardiomyopathy (HCC)    Morbid obesity with BMI of 40.0-44.9, adult (HCC)          PLAN    Continue strength and conditioning as tolerated.  No true restrictions.  We discussed slowly progressing activity as pain allows.  He was strongly encouraged to continue with healthy lifestyle choices in general conditioning.  We discussed the probability of eventual total knee arthroplasty.  He will return to work with no restrictions.  Follow-up in 6 weeks to reevaluate and see how he is progressing.    Return in about 6 weeks (around 11/2/2021) for recheck.    Román Cazares MD

## 2022-01-19 RX ORDER — FUROSEMIDE 40 MG/1
40 TABLET ORAL DAILY PRN
Qty: 90 TABLET | Refills: 3 | Status: SHIPPED | OUTPATIENT
Start: 2022-01-19

## 2022-01-19 NOTE — PROGRESS NOTES
Subjective:     Chief Complaint   Patient presents with   • Congestive Heart Failure   CC: CHF    Congestive Heart Failure  Presents for follow-up visit. The disease course has been worsening. Pertinent negatives include no abdominal pain, chest pain, claudication, edema, fatigue, orthopnea, palpitations, paroxysmal nocturnal dyspnea, shortness of breath or unexpected weight change. The symptoms have been improving. Past treatments include ACE inhibitors, aldosterone receptor blockers, beta blockers, digoxin and salt and fluid restriction. The treatment provided moderate relief. Compliance with prior treatments has been good. His past medical history is significant for DM and HTN. There is no history of CAD, chronic lung disease, hyperthyroidism or valvular heart disease. Compliance with total regimen is 51-75%. Compliance problems include adherence to exercise.  Compliance with diet is %. Compliance with exercise is 51-75%. Compliance with medications is %.     The patient is a 53-year-old gentleman with history of nonischemic cardiomyopathy, undergoing cardiac catheterization in 2011.  The patient has been on optimal medical therapy since that time.  In approximately December 2016, transthoracic echocardiogram demonstrated improvement in LVEF to 50%.  Dr. Sharma subsequently decreased Lasix to 40 mg daily.    On 05/02/2018, the patient presented for office evaluation with Dr. Sharma at which time he was found to have clinical evidence of hypervolemia thus receiving in office subcutaneous Lasix and ejection.   In addition, diuretic therapy was increased to Lasix 40 mg twice a day.    On 05/04/2018, the patient presented for evaluation with ANNA MARIE Post, thus receiving additional 40 mg subcutaneous Lasix, along with continuation of Lasix 40 mg twice a day oral.    On 05/09/2018 the patient presented back for reevaluation with ANNA MARIE Post at which time he presented euvolemic.  Lasix 40 mg  twice daily was continued with plans for follow-up today.    06/05/2018  The patient is reporting improvement in his overall dietary choices (compliance with dietary sodium restriction); compliance with fluid restrictions of 1 L per day; compliance with medications as prescribed.    6/10/19: 1 year followup. Has not been seen since last year. Reports medications are affecting his sex life (decreased libido, decreased ability to achieve erection). Patient appears euvolemic and without symptoms of fluid overload including edema, SOA, fatigue.     3/16/20: Mr. Teresa returns today after long interval. He does not keep regularly scheduled follow ups but calls when he wants to be seen. He has lost 20+lbs. And has been doing well. He is off of most medications now only taking Coreg, digoxin, and Aldactone. Can stop Digoxin. Will repeat echo.     1/29/2021: Mr. Teresa returns today after calling to be seen. He is doing well. Repeated echo last year with ongoing DCM with EF of 21%. He is still taking Coreg, Hydralazine, Imdur, and aldactone. CKD III, we have been cautious with ACE/ARB use.   He is having URI symptoms and frequent visits to PCP for abx and steroids. He has never smoked. He has exposure to rock dust at work. He feels it is beginning to cause him problems. He has not had PFTs. I offered a CXR today. He wants to delay this and get it done the next time he is here. We discussed disability. I brought it up. His DCM will catch up with him eventually, but for now he is maintaining his NYHA Class II symptoms. We discussed his echo. No ICD at this time due to his job.   Will add Corlanor as he is on 25mg BID Coreg and HR is still 90.     5/24/21:  3 month follow up. He has been doing well, still working. He is having knee pain and seeing Ortho. MRI Thursday.   CHILEL continues but is controlled. He has had to modify his work habits to allow for his breathing to recover.   Should he need knee surgery, he will be able to  have that from a Cardiology stand point. He is NYHA class II.   He had called last week to discuss unilateral leg swelling. Looking at it today, the right leg is just slightly larger than left. There is trace pitting edema in left and 1+ in right.   HR is improved with Corlanor. Weight is down.   We discussed disability again. This may be a good opportunity depending on what his MRI shows regarding disability claim. 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     8/31/21: 3 month follow up. Doing well. Breathing is good. BP is slightly elevated today, has not had medications yet.   Awaiting right knee replacement soon. Had injection last week with Dr. Cazares.    Labs soon with PCP.     1/20/22: You have chosen to receive care through a telehealth visit.  Do you consent to use a video/audio connection for your medical care today? Yes  Video visit due to snow/ice.     6 month follow up for CHF. He has been doing well. Dr. Navarro's is still following him for knee discomfort. Changing PCPs to Zakiya George. 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   He has no leg swelling today. He is doing well.   Ongoing sinusitis which is chronic.        Review of Systems   Constitutional: Negative for chills, decreased appetite, fatigue, fever and unexpected weight change.   HENT: Negative for congestion and sore throat.    Eyes: Negative.    Cardiovascular: Negative for chest pain, claudication, dyspnea on exertion, irregular heartbeat, leg swelling, orthopnea, palpitations and paroxysmal nocturnal dyspnea.   Respiratory: Negative for cough, shortness of breath and wheezing.    Endocrine: Negative.  Negative for polydipsia, polyphagia and polyuria.   Hematologic/Lymphatic: Negative for bleeding problem. Does not bruise/bleed easily.   Skin: Negative for dry skin, flushing and rash.   Musculoskeletal: Negative for falls and myalgias.   Gastrointestinal: Negative for bloating, abdominal pain, change in bowel habit and  "melena.   Genitourinary: Negative for frequency and hematuria.   Neurological: Negative for dizziness, light-headedness, loss of balance and weakness.   Psychiatric/Behavioral: Negative for altered mental status and memory loss. The patient is not nervous/anxious.      Current Outpatient Medications   Medication Sig Dispense Refill   • albuterol (PROAIR HFA) 108 (90 Base) MCG/ACT inhaler Inhale 2 puffs Every 4 (Four) Hours As Needed for Wheezing.     • albuterol sulfate  (90 Base) MCG/ACT inhaler Inhale 2 puffs Every 4 (Four) Hours As Needed for Wheezing or Shortness of Air. 6.7 g 3   • brompheniramine-pseudoephedrine-DM 30-2-10 MG/5ML syrup Take 2.5 mL by mouth 4 (Four) Times a Day As Needed for Congestion or Allergies. 473 mL 3   • carvedilol (COREG) 25 MG tablet TAKE ONE TABLET BY MOUTH TWO TIMES A DAY WITH MEALS 180 tablet 3   • furosemide (LASIX) 40 MG tablet Take 1 tablet by mouth Daily As Needed (weight gain of 3lbs overnight or 5lbs in a week.). 90 tablet 3   • hydrALAZINE (APRESOLINE) 25 MG tablet Take 1 tablet by mouth 3 (Three) Times a Day. 270 tablet 2   • isosorbide mononitrate (IMDUR) 30 MG 24 hr tablet Take 1 tablet by mouth Daily. 90 tablet 3   • ivabradine HCl (CORLANOR) 5 MG tablet tablet Take 1 tablet by mouth 2 (Two) Times a Day With Meals. 60 tablet 11   • promethazine (PHENERGAN) 12.5 MG tablet Take 1 tablet by mouth Every 6 (Six) Hours As Needed for Nausea or Vomiting. 30 tablet 3   • spironolactone (ALDACTONE) 25 MG tablet TAKE 1 TABLET BY MOUTH DAILY. 90 tablet 3   • ibuprofen (ADVIL,MOTRIN) 800 MG tablet      • promethazine (PHENERGAN) 25 MG tablet TAKE ONE TABLET BY MOUTH EVERY SIX HOURS AS NEEDED FOR NAUSEA       No current facility-administered medications for this visit.     Objective:     Vitals:    01/20/22 0846   Weight: (!) 143 kg (315 lb)   Height: 182.9 cm (72\")     Wt Readings from Last 3 Encounters:   01/20/22 (!) 143 kg (315 lb)   09/21/21 (!) 144 kg (317 lb)   08/31/21 " (!) 144 kg (317 lb 12.8 oz)     Physical Exam  Constitutional:       Appearance: He is well-developed.   HENT:      Head: Normocephalic and atraumatic.   Eyes:      General:         Right eye: No discharge.      Conjunctiva/sclera: Conjunctivae normal.   Neck:      Vascular: No JVD.   Pulmonary:      Effort: Pulmonary effort is normal. No respiratory distress.   Abdominal:      General: There is no distension.      Palpations: Abdomen is soft.   Musculoskeletal:         General: Normal range of motion.      Cervical back: Normal range of motion.      Right lower leg: No edema.      Left lower leg: No edema.   Skin:     General: Skin is dry.   Neurological:      Mental Status: He is alert and oriented to person, place, and time.   Psychiatric:         Behavior: Behavior normal.         Thought Content: Thought content normal.         Judgment: Judgment normal.       Data Reviewed:           Results for orders placed during the hospital encounter of 04/13/20    Adult Transthoracic Echo Complete W/ Cont if Necessary Per Protocol    Interpretation Summary  · Study is technically adequate. Patient refused Definity contrast to evaluate for LV thrombus.  · Left ventricle systolic function is severely decreased at 21-25%. Left ventricular cavity is severely dilated. Restrictive diastolic function.  · Right ventricular systolic function is moderately reduced. Right ventricular cavity is moderately dilated.  · Dilated mitral annulus with moderate to severe mitral regurgitation.  · Dilated tricuspid annulus with at least moderate tricuspid regurgitation. PA systolic pressure is 52 mmHg and consistent with pulmonary hypertension.  · Dilated pulmonic annulus with moderate pulmonary regurgitation.  · Tubular ascending aorta demonstrates ectasia to 3.9 cm. The patient's ASI is 1.4 cm/meter squared.  · Since prior study, there has been interval dilatation of the left ventricle with a reduction in LVEF and worsening valvular  regurgitation.      Transthoracic echocardogram: 06/18/2018  · The left ventricular cavity is moderately dilated.  · Left ventricular wall thickness is consistent with moderate concentric hypertrophy.  · Estimated EF = 33%. Global hypokinesis of the left ventricle  · Left ventricular diastolic dysfunction (grade I) consistent with impaired relaxation.  · Right ventricular cavity is mildly dilated.  · Left atrial cavity size is moderately dilated.  · Mild mitral valve regurgitation is present  · Mild-to-moderate pulmonic valve regurgitation is present.    Transthoracic echocardiogram: 12/02/2016      Transthoracic echocardiogram: 09/29/2016      Left heart catheterization: 07/17/2011  HEMODYNAMICS: Aortic pressure 152/81 Mean 110 Left ventricular pressure 152/18   LEFT VENTRICULOGRAM: Shows mild left ventricular enlargement with overall preserved left ventricular systolic function with estimated ejection fraction of about 55%.   CORONARY ARTERIES: Right dominant system   LEFT MAIN: Free of any disease.   LAD: Medium caliber vessel which in the proximal portion had evidence of luminal irregularity with good MARQUISE 3 flow. The diagonal branch had minimal plaquing with evidence of good MARQUISE 3 flow.   CX: Medium caliber vessel which gave out a medium caliber ramus intermedius branch which was free of any obstructive stenosis. The circumflex artery after the origin of the ramus intermedius branch was free of any of obstructive stenosis with good MARQUISE 3 flow.   RCA: Medium caliber dominant vessel which was free of any obstructive stenosis with good MARQUISE 3 flow.   FINAL DX:   1. No evidence of any obstructive epicardial coronary artery disease.   2. Preserved left ventricular systolic function with estimated ejection fraction of about 55%.   LEONID GREER M.D.       Lab Results   Component Value Date    GLUCOSE 97 09/16/2020    BUN 10 09/16/2020    CREATININE 1.43 (H) 09/16/2020    EGFRIFAFRI 62 09/16/2020    BCR 7.0  09/16/2020    K 4.0 09/16/2020    CO2 26.6 09/16/2020    CALCIUM 9.5 09/16/2020    ALBUMIN 4.20 09/16/2020    AST 21 09/16/2020    ALT 21 09/16/2020     Lab Results   Component Value Date    WBC 6.61 09/16/2020    HGB 14.4 09/16/2020    HCT 42.0 09/16/2020    MCV 89.6 09/16/2020     09/16/2020     Lab Results   Component Value Date    CHOL 123 09/16/2020    TRIG 76 09/16/2020    HDL 28 (L) 09/16/2020    LDL 80 09/16/2020     Lab Results   Component Value Date    HGBA1C 5.9 (H) 05/02/2018     Lab Results   Component Value Date    TSH 1.120 05/02/2018      Ref. Range 7/14/2017 13:50 5/2/2018 15:42   proBNP Latest Ref Range: 0.0 - 900.0 pg/mL 229.0 2,620.0 (H)      Ref. Range 9/16/2020 10:22   proBNP Latest Ref Range: 0.0 - 900.0 pg/mL 2,198.0 (H)     The following portions of the patient's history were reviewed and updated as appropriate: allergies, current medications, past family history, past medical history, past social history, past surgical history and problem list.  Old records reviewed and pertinent information is included in the above objective data.      Assessment/Plan:      Diagnosis Plan   1. Chronic systolic CHF (congestive heart failure) without clinical evidence of hypervolemia; he is well perfused [facility]    AHA Stage C ; NYHA Class II   LVEF: 20%  BETA-BLOCKER: Carvedilol 25 mg twice daily  CORLANOR: 5mg BID. HR improved.   ACE/ARB: Lisinopril 40 mg daily- discontinued by another provider, CKD II  ENTRESTO: N/A  DIURETIC: Lasix to 40 mg daily PRN. Take about once or twice a month for leg swelling.   ALDOSTERONT ANTAGONIST: Spironolactone 25mg daily  IMDUR/HYDRALAZINE: Hydralazine 25mg/Imdur 30mg  DIGOXIN: discontinued by another provider.  Fluid restriction: 1.5 L daily  Sodium restriction: 2000 mg daily   6MWT: 06/05/2018   Cardiac Rehab: N/A  Pulmonary Rehab: N/A  ICD: indicated, but deferring due to his job.   CardioMEMS: N/A  Advanced HF evaluation (Transplant/LVAD): N/A@this  "time    Recommended daily weight monitoring.  Discussed patient action plan for heart failure.  Recommended avoiding NSAIDs use.  Discussed warning signs requiring additional medical attention for heart failure.    Activity: Approximately 150 minutes of moderate activity (such as walking program)  per week (20-30 minutes most days of the week)  Diet: Heart healthy foods - more fresh fruits and vegetables and whole grains; less red meat; more fish and poultry that is baked or grilled - not fried; less salt not to exceed 2000 mg daily - less processed food; No trans or saturated fat  Non Smoker  Diabetes management  Blood pressure management  Weight management: Patient's Body mass index is 42.72 kg/m². BMI is above normal parameters. Recommendations include: exercise counseling and nutrition counseling.  Stress management            2. Preop Evaluation Pre-Op Evaluation Assessment  56 y.o. male with planned surgery right knee replacement.     Known risk factors for perioperative complications: Congestive heart failure.      Cardiac Risk Estimation: per the Revised Cardiac Risk Index (Circ. 100:1043, 1999), the patient's risk factors for cardiac complications include history of congestive heart failure, putting him in: RCI RISK CLASS II (1 risk factor, risk of major cardiac compl. appr. 1.3%).     Pre-Op Evaluation Plan  1. Preoperative workup as follows none.  2. Change in medication regimen before surgery: none, continue medication regimen including morning of surgery, with sip of water.  3. Prophylaxis for cardiac events with perioperative beta-blockers: not indicated.  4. Invasive hemodynamic monitoring perioperatively: not indicated.  5. Deep vein thrombosis prophylaxis:regimen to be chosen by surgical team.      Cardiovascular Risk Estimates provided by the RCRI are provisional and no guarantee of outcome. [facility] does not provide \"surgical clearance,\" but only provides a risk assessment and management " options.  The final decision for operative intervention is at the discretion of the operating physician.    Various activity scales provide the clinician with a set of questions to determine a patient's functional capacity. Indicators of functional status include the following:  ?Can take care of self, such as eat, dress, or use the toilet (1 MET)  ?Can walk up a flight of steps or a hill or walk on level ground at 3 to 4 mph (4 METs)  ?Can do heavy work around the house, such as scrubbing floors or lifting or moving heavy furniture, or climb two flights of stairs (between 4 and 10 METs)  ?Can participate in strenuous sports such as swimming, singles tennis, football, basketball, and skiing (>10 METs)         Follow up: 3 months.           This document has been electronically signed by ANNA MARIE Lantigua on January 20, 2022 09:20 CST

## 2022-01-20 ENCOUNTER — TELEMEDICINE (OUTPATIENT)
Dept: CARDIOLOGY | Facility: CLINIC | Age: 57
End: 2022-01-20

## 2022-01-20 VITALS — BODY MASS INDEX: 42.66 KG/M2 | WEIGHT: 315 LBS | HEIGHT: 72 IN

## 2022-01-20 DIAGNOSIS — I42.0 DILATED CARDIOMYOPATHY: Primary | ICD-10-CM

## 2022-01-20 PROCEDURE — 99213 OFFICE O/P EST LOW 20 MIN: CPT | Performed by: NURSE PRACTITIONER

## 2022-01-20 RX ORDER — PROMETHAZINE HYDROCHLORIDE 25 MG/1
TABLET ORAL
COMMUNITY
Start: 2021-10-27

## 2022-01-20 RX ORDER — ALBUTEROL SULFATE 90 UG/1
2 AEROSOL, METERED RESPIRATORY (INHALATION) EVERY 4 HOURS PRN
Qty: 6.7 G | Refills: 11 | Status: SHIPPED | OUTPATIENT
Start: 2022-01-20

## 2022-01-20 RX ORDER — BROMPHENIRAMINE MALEATE, PSEUDOEPHEDRINE HYDROCHLORIDE, AND DEXTROMETHORPHAN HYDROBROMIDE 2; 30; 10 MG/5ML; MG/5ML; MG/5ML
2.5 SYRUP ORAL 4 TIMES DAILY PRN
Qty: 473 ML | Refills: 3 | Status: SHIPPED | OUTPATIENT
Start: 2022-01-20

## 2022-03-23 RX ORDER — IVABRADINE 5 MG/1
TABLET, FILM COATED ORAL
Qty: 60 TABLET | Refills: 11 | Status: SHIPPED | OUTPATIENT
Start: 2022-03-23

## 2022-04-25 ENCOUNTER — TELEMEDICINE (OUTPATIENT)
Dept: CARDIOLOGY | Facility: CLINIC | Age: 57
End: 2022-04-25

## 2022-04-25 VITALS — BODY MASS INDEX: 42.66 KG/M2 | HEIGHT: 72 IN | WEIGHT: 315 LBS

## 2022-04-25 DIAGNOSIS — I50.22 CHRONIC SYSTOLIC CHF (CONGESTIVE HEART FAILURE): Primary | ICD-10-CM

## 2022-04-25 PROCEDURE — 99213 OFFICE O/P EST LOW 20 MIN: CPT | Performed by: NURSE PRACTITIONER

## 2022-04-25 RX ORDER — CARVEDILOL 25 MG/1
25 TABLET ORAL 2 TIMES DAILY WITH MEALS
Qty: 180 TABLET | Refills: 3 | Status: SHIPPED | OUTPATIENT
Start: 2022-04-25

## 2022-04-25 NOTE — PROGRESS NOTES
Subjective:     Chief Complaint   Patient presents with   • Congestive Heart Failure     Chief Complaint   CC: CHF    Congestive Heart Failure  Presents for follow-up visit. The disease course has been worsening. Pertinent negatives include no abdominal pain, chest pain, claudication, edema, fatigue, orthopnea, palpitations, paroxysmal nocturnal dyspnea, shortness of breath or unexpected weight change. The symptoms have been improving. Past treatments include ACE inhibitors, aldosterone receptor blockers, beta blockers, digoxin and salt and fluid restriction. The treatment provided moderate relief. Compliance with prior treatments has been good. His past medical history is significant for DM and HTN. There is no history of CAD, chronic lung disease, hyperthyroidism or valvular heart disease. Compliance with total regimen is 51-75%. Compliance problems include adherence to exercise.  Compliance with diet is %. Compliance with exercise is 51-75%. Compliance with medications is %.     The patient is a 53-year-old gentleman with history of nonischemic cardiomyopathy, undergoing cardiac catheterization in 2011.  The patient has been on optimal medical therapy since that time.  In approximately December 2016, transthoracic echocardiogram demonstrated improvement in LVEF to 50%.  Dr. Sharma subsequently decreased Lasix to 40 mg daily.    On 05/02/2018, the patient presented for office evaluation with Dr. Sharma at which time he was found to have clinical evidence of hypervolemia thus receiving in office subcutaneous Lasix and ejection.   In addition, diuretic therapy was increased to Lasix 40 mg twice a day.    On 05/04/2018, the patient presented for evaluation with ANNA MARIE Post, thus receiving additional 40 mg subcutaneous Lasix, along with continuation of Lasix 40 mg twice a day oral.    On 05/09/2018 the patient presented back for reevaluation with ANNA MARIE Post at which time he presented  euvolemic.  Lasix 40 mg twice daily was continued with plans for follow-up today.    06/05/2018  The patient is reporting improvement in his overall dietary choices (compliance with dietary sodium restriction); compliance with fluid restrictions of 1 L per day; compliance with medications as prescribed.    6/10/19: 1 year followup. Has not been seen since last year. Reports medications are affecting his sex life (decreased libido, decreased ability to achieve erection). Patient appears euvolemic and without symptoms of fluid overload including edema, SOA, fatigue.     3/16/20: Mr. Teresa returns today after long interval. He does not keep regularly scheduled follow ups but calls when he wants to be seen. He has lost 20+lbs. And has been doing well. He is off of most medications now only taking Coreg, digoxin, and Aldactone. Can stop Digoxin. Will repeat echo.     1/29/2021: Mr. Teresa returns today after calling to be seen. He is doing well. Repeated echo last year with ongoing DCM with EF of 21%. He is still taking Coreg, Hydralazine, Imdur, and aldactone. CKD III, we have been cautious with ACE/ARB use.   He is having URI symptoms and frequent visits to PCP for abx and steroids. He has never smoked. He has exposure to rock dust at work. He feels it is beginning to cause him problems. He has not had PFTs. I offered a CXR today. He wants to delay this and get it done the next time he is here. We discussed disability. I brought it up. His DCM will catch up with him eventually, but for now he is maintaining his NYHA Class II symptoms. We discussed his echo. No ICD at this time due to his job.   Will add Corlanor as he is on 25mg BID Coreg and HR is still 90.     5/24/21:  3 month follow up. He has been doing well, still working. He is having knee pain and seeing Ortho. MRI Thursday.   CHILEL continues but is controlled. He has had to modify his work habits to allow for his breathing to recover.   Should he need knee  surgery, he will be able to have that from a Cardiology stand point. He is NYHA class II.   He had called last week to discuss unilateral leg swelling. Looking at it today, the right leg is just slightly larger than left. There is trace pitting edema in left and 1+ in right.   HR is improved with Corlanor. Weight is down.   We discussed disability again. This may be a good opportunity depending on what his MRI shows regarding disability claim. 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     8/31/21: 3 month follow up. Doing well. Breathing is good. BP is slightly elevated today, has not had medications yet.   Awaiting right knee replacement soon. Had injection last week with Dr. Cazares.    Labs soon with PCP.     1/20/22: You have chosen to receive care through a telehealth visit.  Do you consent to use a video/audio connection for your medical care today? Yes  Video visit due to snow/ice.     6 month follow up for CHF. He has been doing well. Dr. Navarro's is still following him for knee discomfort. Changing PCPs to Zakiya George. 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   He has no leg swelling today. He is doing well.   Ongoing sinusitis which is chronic.     4/25/22: You have chosen to receive care through a telehealth visit.  Do you consent to use a video/audio connection for your medical care today? Yes    Mr. Teresa has been doing well. This is his 6 month follow up. He has no swelling or shortness of breath. 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 He is compliant with his medications.          Review of Systems   Constitutional: Negative for chills, decreased appetite, fatigue, fever and unexpected weight change.   HENT: Negative for congestion and sore throat.    Eyes: Negative.    Cardiovascular: Negative for chest pain, claudication, dyspnea on exertion, irregular heartbeat, leg swelling, orthopnea, palpitations and paroxysmal nocturnal dyspnea.   Respiratory:  Negative for cough, shortness of breath and wheezing.    Endocrine: Negative.  Negative for polydipsia, polyphagia and polyuria.   Hematologic/Lymphatic: Negative for bleeding problem. Does not bruise/bleed easily.   Skin: Negative for dry skin, flushing and rash.   Musculoskeletal: Negative for falls and myalgias.   Gastrointestinal: Negative for bloating, abdominal pain, change in bowel habit and melena.   Genitourinary: Negative for frequency and hematuria.   Neurological: Negative for dizziness, light-headedness, loss of balance and weakness.   Psychiatric/Behavioral: Negative for altered mental status and memory loss. The patient is not nervous/anxious.      Current Outpatient Medications   Medication Sig Dispense Refill   • albuterol sulfate  (90 Base) MCG/ACT inhaler Inhale 2 puffs Every 4 (Four) Hours As Needed for Wheezing or Shortness of Air. 6.7 g 11   • brompheniramine-pseudoephedrine-DM 30-2-10 MG/5ML syrup Take 2.5 mL by mouth 4 (Four) Times a Day As Needed for Congestion or Allergies. 473 mL 3   • carvedilol (COREG) 25 MG tablet Take 1 tablet by mouth 2 (Two) Times a Day With Meals. 180 tablet 3   • Corlanor 5 MG tablet tablet TAKE ONE TABLET BY MOUTH TWO TIMES A DAY WITH MEALS 60 tablet 11   • furosemide (LASIX) 40 MG tablet Take 1 tablet by mouth Daily As Needed (weight gain of 3lbs overnight or 5lbs in a week.). 90 tablet 3   • hydrALAZINE (APRESOLINE) 25 MG tablet Take 1 tablet by mouth 3 (Three) Times a Day. 270 tablet 2   • isosorbide mononitrate (IMDUR) 30 MG 24 hr tablet Take 1 tablet by mouth Daily. 90 tablet 3   • promethazine (PHENERGAN) 12.5 MG tablet Take 1 tablet by mouth Every 6 (Six) Hours As Needed for Nausea or Vomiting. 30 tablet 3   • promethazine (PHENERGAN) 25 MG tablet TAKE ONE TABLET BY MOUTH EVERY SIX HOURS AS NEEDED FOR NAUSEA     • spironolactone (ALDACTONE) 25 MG tablet TAKE 1 TABLET BY MOUTH DAILY. 90 tablet 3     No current facility-administered medications for  "this visit.     Objective:     Vitals:    04/25/22 1616   Weight: (!) 143 kg (315 lb)   Height: 182.9 cm (72\")     Wt Readings from Last 3 Encounters:   04/25/22 (!) 143 kg (315 lb)   01/20/22 (!) 143 kg (315 lb)   09/21/21 (!) 144 kg (317 lb)     Physical Exam  Constitutional:       Appearance: He is well-developed.   HENT:      Head: Normocephalic and atraumatic.   Eyes:      General:         Right eye: No discharge.      Conjunctiva/sclera: Conjunctivae normal.   Neck:      Vascular: No JVD.   Pulmonary:      Effort: Pulmonary effort is normal. No respiratory distress.   Abdominal:      General: There is no distension.      Palpations: Abdomen is soft.   Musculoskeletal:         General: Normal range of motion.      Cervical back: Normal range of motion.      Right lower leg: No edema.      Left lower leg: No edema.   Skin:     General: Skin is dry.   Neurological:      Mental Status: He is alert and oriented to person, place, and time.   Psychiatric:         Behavior: Behavior normal.         Thought Content: Thought content normal.         Judgment: Judgment normal.       Data Reviewed:           Results for orders placed during the hospital encounter of 04/13/20    Adult Transthoracic Echo Complete W/ Cont if Necessary Per Protocol    Interpretation Summary  · Study is technically adequate. Patient refused Definity contrast to evaluate for LV thrombus.  · Left ventricle systolic function is severely decreased at 21-25%. Left ventricular cavity is severely dilated. Restrictive diastolic function.  · Right ventricular systolic function is moderately reduced. Right ventricular cavity is moderately dilated.  · Dilated mitral annulus with moderate to severe mitral regurgitation.  · Dilated tricuspid annulus with at least moderate tricuspid regurgitation. PA systolic pressure is 52 mmHg and consistent with pulmonary hypertension.  · Dilated pulmonic annulus with moderate pulmonary regurgitation.  · Tubular ascending " aorta demonstrates ectasia to 3.9 cm. The patient's ASI is 1.4 cm/meter squared.  · Since prior study, there has been interval dilatation of the left ventricle with a reduction in LVEF and worsening valvular regurgitation.      Transthoracic echocardogram: 06/18/2018  · The left ventricular cavity is moderately dilated.  · Left ventricular wall thickness is consistent with moderate concentric hypertrophy.  · Estimated EF = 33%. Global hypokinesis of the left ventricle  · Left ventricular diastolic dysfunction (grade I) consistent with impaired relaxation.  · Right ventricular cavity is mildly dilated.  · Left atrial cavity size is moderately dilated.  · Mild mitral valve regurgitation is present  · Mild-to-moderate pulmonic valve regurgitation is present.    Transthoracic echocardiogram: 12/02/2016      Transthoracic echocardiogram: 09/29/2016      Left heart catheterization: 07/17/2011  HEMODYNAMICS: Aortic pressure 152/81 Mean 110 Left ventricular pressure 152/18   LEFT VENTRICULOGRAM: Shows mild left ventricular enlargement with overall preserved left ventricular systolic function with estimated ejection fraction of about 55%.   CORONARY ARTERIES: Right dominant system   LEFT MAIN: Free of any disease.   LAD: Medium caliber vessel which in the proximal portion had evidence of luminal irregularity with good MARQUISE 3 flow. The diagonal branch had minimal plaquing with evidence of good MARQUISE 3 flow.   CX: Medium caliber vessel which gave out a medium caliber ramus intermedius branch which was free of any obstructive stenosis. The circumflex artery after the origin of the ramus intermedius branch was free of any of obstructive stenosis with good MARQUISE 3 flow.   RCA: Medium caliber dominant vessel which was free of any obstructive stenosis with good MARQUISE 3 flow.   FINAL DX:   1. No evidence of any obstructive epicardial coronary artery disease.   2. Preserved left ventricular systolic function with estimated ejection  fraction of about 55%.   LEONID GREER M.D.       Lab Results   Component Value Date    GLUCOSE 97 09/16/2020    BUN 10 09/16/2020    CREATININE 1.43 (H) 09/16/2020    EGFRIFAFRI 62 09/16/2020    BCR 7.0 09/16/2020    K 4.0 09/16/2020    CO2 26.6 09/16/2020    CALCIUM 9.5 09/16/2020    ALBUMIN 4.20 09/16/2020    AST 21 09/16/2020    ALT 21 09/16/2020     Lab Results   Component Value Date    WBC 6.61 09/16/2020    HGB 14.4 09/16/2020    HCT 42.0 09/16/2020    MCV 89.6 09/16/2020     09/16/2020     Lab Results   Component Value Date    CHOL 123 09/16/2020    TRIG 76 09/16/2020    HDL 28 (L) 09/16/2020    LDL 80 09/16/2020     Lab Results   Component Value Date    HGBA1C 5.9 (H) 05/02/2018     Lab Results   Component Value Date    TSH 1.120 05/02/2018      Ref. Range 7/14/2017 13:50 5/2/2018 15:42   proBNP Latest Ref Range: 0.0 - 900.0 pg/mL 229.0 2,620.0 (H)      Ref. Range 9/16/2020 10:22   proBNP Latest Ref Range: 0.0 - 900.0 pg/mL 2,198.0 (H)     The following portions of the patient's history were reviewed and updated as appropriate: allergies, current medications, past family history, past medical history, past social history, past surgical history and problem list.  Old records reviewed and pertinent information is included in the above objective data.      Assessment/Plan:      Diagnosis Plan   1. Chronic systolic CHF (congestive heart failure) without clinical evidence of hypervolemia; he is well perfused [facility]    AHA Stage C ; NYHA Class II   LVEF: 20%  BETA-BLOCKER: Carvedilol 25 mg twice daily  CORLANOR: 5mg BID. HR improved.   ACE/ARB: Lisinopril 40 mg daily- discontinued by another provider, CKD II  ENTRESTO: N/A  DIURETIC: Lasix to 40 mg daily PRN. Take about once or twice a month for leg swelling.   ALDOSTERONT ANTAGONIST: Spironolactone 25mg daily  IMDUR/HYDRALAZINE: Hydralazine 25mg/Imdur 30mg  DIGOXIN: discontinued by another provider.  Fluid restriction: 1.5 L daily  Sodium restriction:  2000 mg daily   6MWT: 06/05/2018   Cardiac Rehab: N/A  Pulmonary Rehab: N/A  ICD: indicated, but deferring due to his job.   CardioMEMS: N/A  Advanced HF evaluation (Transplant/LVAD): N/A@this time    Recommended daily weight monitoring.  Discussed patient action plan for heart failure.  Recommended avoiding NSAIDs use.  Discussed warning signs requiring additional medical attention for heart failure.    Activity: Approximately 150 minutes of moderate activity (such as walking program)  per week (20-30 minutes most days of the week)  Diet: Heart healthy foods - more fresh fruits and vegetables and whole grains; less red meat; more fish and poultry that is baked or grilled - not fried; less salt not to exceed 2000 mg daily - less processed food; No trans or saturated fat  Non Smoker  Diabetes management  Blood pressure management  Weight management: Patient's Body mass index is 42.72 kg/m². BMI is above normal parameters. Recommendations include: exercise counseling and nutrition counseling.  Stress management            2. Preop Evaluation Pre-Op Evaluation Assessment  57 y.o. male with planned surgery right knee replacement.     Known risk factors for perioperative complications: Congestive heart failure.      Cardiac Risk Estimation: per the Revised Cardiac Risk Index (Circ. 100:1043, 1999), the patient's risk factors for cardiac complications include history of congestive heart failure, putting him in: RCI RISK CLASS II (1 risk factor, risk of major cardiac compl. appr. 1.3%).     Pre-Op Evaluation Plan  1. Preoperative workup as follows none.  2. Change in medication regimen before surgery: none, continue medication regimen including morning of surgery, with sip of water.  3. Prophylaxis for cardiac events with perioperative beta-blockers: not indicated.  4. Invasive hemodynamic monitoring perioperatively: not indicated.  5. Deep vein thrombosis prophylaxis:regimen to be chosen by surgical  "team.      Cardiovascular Risk Estimates provided by the [place] are provisional and no guarantee of outcome. [facility] does not provide \"surgical clearance,\" but only provides a risk assessment and management options.  The final decision for operative intervention is at the discretion of the operating physician.    Various activity scales provide the clinician with a set of questions to determine a patient's functional capacity. Indicators of functional status include the following:  ?Can take care of self, such as eat, dress, or use the toilet (1 MET)  ?Can walk up a flight of steps or a hill or walk on level ground at 3 to 4 mph (4 METs)  ?Can do heavy work around the house, such as scrubbing floors or lifting or moving heavy furniture, or climb two flights of stairs (between 4 and 10 METs)  ?Can participate in strenuous sports such as swimming, singles tennis, football, basketball, and skiing (>10 METs)         Follow up: 3 months.           This document has been electronically signed by ANNA MARIE Lantigua on April 25, 2022 16:27 CDT        "

## 2022-08-23 RX ORDER — SPIRONOLACTONE 25 MG/1
25 TABLET ORAL DAILY
Qty: 90 TABLET | Refills: 0 | Status: SHIPPED | OUTPATIENT
Start: 2022-08-23

## 2022-10-04 RX ORDER — ISOSORBIDE MONONITRATE 30 MG/1
30 TABLET, EXTENDED RELEASE ORAL DAILY
Qty: 90 TABLET | Refills: 3 | Status: SHIPPED | OUTPATIENT
Start: 2022-10-04

## 2023-05-30 RX ORDER — HYDRALAZINE HYDROCHLORIDE 25 MG/1
TABLET, FILM COATED ORAL
Qty: 270 TABLET | Refills: 2 | OUTPATIENT
Start: 2023-05-30

## 2023-05-30 RX ORDER — IVABRADINE 5 MG/1
TABLET, FILM COATED ORAL
Qty: 60 TABLET | Refills: 11 | OUTPATIENT
Start: 2023-05-30

## 2023-05-30 RX ORDER — CARVEDILOL 25 MG/1
TABLET ORAL
Qty: 180 TABLET | Refills: 3 | OUTPATIENT
Start: 2023-05-30

## 2023-05-30 NOTE — TELEPHONE ENCOUNTER
PT NEEDS APPOINTMENT. HAS NOT BEEN SEEN SINCE 2021. AND ANNA MARIE CARNES DOES NOT WORK IN CARDIOLOGY ANYMORE.   
bed

## 2023-09-18 ENCOUNTER — APPOINTMENT (OUTPATIENT)
Dept: GENERAL RADIOLOGY | Facility: HOSPITAL | Age: 58
DRG: 291 | End: 2023-09-18
Payer: COMMERCIAL

## 2023-09-18 ENCOUNTER — HOSPITAL ENCOUNTER (INPATIENT)
Facility: HOSPITAL | Age: 58
LOS: 6 days | Discharge: HOME | DRG: 291 | End: 2023-09-24
Attending: STUDENT IN AN ORGANIZED HEALTH CARE EDUCATION/TRAINING PROGRAM
Payer: COMMERCIAL

## 2023-09-18 ENCOUNTER — LAB (OUTPATIENT)
Dept: LAB | Facility: HOSPITAL | Age: 58
End: 2023-09-18
Payer: COMMERCIAL

## 2023-09-18 DIAGNOSIS — R06.00 DYSPNEA, UNSPECIFIED TYPE: Primary | ICD-10-CM

## 2023-09-18 DIAGNOSIS — I48.91 ATRIAL FIBRILLATION, UNSPECIFIED TYPE: ICD-10-CM

## 2023-09-18 DIAGNOSIS — R07.9 CHEST PAIN, UNSPECIFIED TYPE: ICD-10-CM

## 2023-09-18 DIAGNOSIS — R79.89 ELEVATED TROPONIN: ICD-10-CM

## 2023-09-18 DIAGNOSIS — I50.23 ACUTE ON CHRONIC SYSTOLIC CONGESTIVE HEART FAILURE: Primary | ICD-10-CM

## 2023-09-18 DIAGNOSIS — R06.00 DYSPNEA, UNSPECIFIED TYPE: ICD-10-CM

## 2023-09-18 DIAGNOSIS — I10 BENIGN HYPERTENSION: ICD-10-CM

## 2023-09-18 DIAGNOSIS — I42.8 NON-ISCHEMIC CARDIOMYOPATHY: ICD-10-CM

## 2023-09-18 DIAGNOSIS — I50.23 ACUTE ON CHRONIC SYSTOLIC CHF (CONGESTIVE HEART FAILURE): ICD-10-CM

## 2023-09-18 PROBLEM — I50.9 CHF EXACERBATION: Status: ACTIVE | Noted: 2023-09-18

## 2023-09-18 LAB
ALBUMIN SERPL-MCNC: 3.9 G/DL (ref 3.5–5.2)
ALBUMIN/GLOB SERPL: 1.2 G/DL
ALP SERPL-CCNC: 68 U/L (ref 39–117)
ALT SERPL W P-5'-P-CCNC: 22 U/L (ref 1–41)
ANION GAP SERPL CALCULATED.3IONS-SCNC: 11 MMOL/L (ref 5–15)
AST SERPL-CCNC: 21 U/L (ref 1–40)
BASOPHILS # BLD AUTO: 0.04 10*3/MM3 (ref 0–0.2)
BASOPHILS NFR BLD AUTO: 0.6 % (ref 0–1.5)
BILIRUB SERPL-MCNC: 1.1 MG/DL (ref 0–1.2)
BUN SERPL-MCNC: 33 MG/DL (ref 6–20)
BUN/CREAT SERPL: 14.9 (ref 7–25)
CALCIUM SPEC-SCNC: 9.6 MG/DL (ref 8.6–10.5)
CHLORIDE SERPL-SCNC: 104 MMOL/L (ref 98–107)
CO2 SERPL-SCNC: 26 MMOL/L (ref 22–29)
CREAT SERPL-MCNC: 2.22 MG/DL (ref 0.76–1.27)
DEPRECATED RDW RBC AUTO: 51.8 FL (ref 37–54)
EGFRCR SERPLBLD CKD-EPI 2021: 33.5 ML/MIN/1.73
EOSINOPHIL # BLD AUTO: 0.1 10*3/MM3 (ref 0–0.4)
EOSINOPHIL NFR BLD AUTO: 1.5 % (ref 0.3–6.2)
ERYTHROCYTE [DISTWIDTH] IN BLOOD BY AUTOMATED COUNT: 14.4 % (ref 12.3–15.4)
GLOBULIN UR ELPH-MCNC: 3.3 GM/DL
GLUCOSE SERPL-MCNC: 105 MG/DL (ref 65–99)
HCT VFR BLD AUTO: 48.4 % (ref 37.5–51)
HGB BLD-MCNC: 15.6 G/DL (ref 13–17.7)
HOLD SPECIMEN: NORMAL
IMM GRANULOCYTES # BLD AUTO: 0.01 10*3/MM3 (ref 0–0.05)
IMM GRANULOCYTES NFR BLD AUTO: 0.1 % (ref 0–0.5)
LYMPHOCYTES # BLD AUTO: 2.27 10*3/MM3 (ref 0.7–3.1)
LYMPHOCYTES NFR BLD AUTO: 33.7 % (ref 19.6–45.3)
MAGNESIUM SERPL-MCNC: 2.3 MG/DL (ref 1.6–2.6)
MCH RBC QN AUTO: 31.4 PG (ref 26.6–33)
MCHC RBC AUTO-ENTMCNC: 32.2 G/DL (ref 31.5–35.7)
MCV RBC AUTO: 97.4 FL (ref 79–97)
MONOCYTES # BLD AUTO: 0.51 10*3/MM3 (ref 0.1–0.9)
MONOCYTES NFR BLD AUTO: 7.6 % (ref 5–12)
NEUTROPHILS NFR BLD AUTO: 3.81 10*3/MM3 (ref 1.7–7)
NEUTROPHILS NFR BLD AUTO: 56.5 % (ref 42.7–76)
NRBC BLD AUTO-RTO: 0 /100 WBC (ref 0–0.2)
NT-PROBNP SERPL-MCNC: 3683 PG/ML (ref 0–900)
PLATELET # BLD AUTO: 198 10*3/MM3 (ref 140–450)
PMV BLD AUTO: 10.4 FL (ref 6–12)
POTASSIUM SERPL-SCNC: 4.6 MMOL/L (ref 3.5–5.2)
PROT SERPL-MCNC: 7.2 G/DL (ref 6–8.5)
RBC # BLD AUTO: 4.97 10*6/MM3 (ref 4.14–5.8)
SODIUM SERPL-SCNC: 141 MMOL/L (ref 136–145)
TROPONIN T SERPL HS-MCNC: 35 NG/L
TROPONIN T SERPL HS-MCNC: 38 NG/L
WBC NRBC COR # BLD: 6.74 10*3/MM3 (ref 3.4–10.8)
WHOLE BLOOD HOLD COAG: NORMAL
WHOLE BLOOD HOLD SPECIMEN: NORMAL

## 2023-09-18 PROCEDURE — 80053 COMPREHEN METABOLIC PANEL: CPT

## 2023-09-18 PROCEDURE — G0378 HOSPITAL OBSERVATION PER HR: HCPCS

## 2023-09-18 PROCEDURE — 83880 ASSAY OF NATRIURETIC PEPTIDE: CPT

## 2023-09-18 PROCEDURE — 93005 ELECTROCARDIOGRAM TRACING: CPT | Performed by: STUDENT IN AN ORGANIZED HEALTH CARE EDUCATION/TRAINING PROGRAM

## 2023-09-18 PROCEDURE — 84484 ASSAY OF TROPONIN QUANT: CPT | Performed by: STUDENT IN AN ORGANIZED HEALTH CARE EDUCATION/TRAINING PROGRAM

## 2023-09-18 PROCEDURE — 99285 EMERGENCY DEPT VISIT HI MDM: CPT

## 2023-09-18 PROCEDURE — 85025 COMPLETE CBC W/AUTO DIFF WBC: CPT

## 2023-09-18 PROCEDURE — 84484 ASSAY OF TROPONIN QUANT: CPT

## 2023-09-18 PROCEDURE — 93010 ELECTROCARDIOGRAM REPORT: CPT | Performed by: INTERNAL MEDICINE

## 2023-09-18 PROCEDURE — 71045 X-RAY EXAM CHEST 1 VIEW: CPT

## 2023-09-18 PROCEDURE — 83735 ASSAY OF MAGNESIUM: CPT | Performed by: STUDENT IN AN ORGANIZED HEALTH CARE EDUCATION/TRAINING PROGRAM

## 2023-09-18 RX ORDER — ACETAMINOPHEN 160 MG/5ML
650 SOLUTION ORAL EVERY 4 HOURS PRN
Status: DISCONTINUED | OUTPATIENT
Start: 2023-09-18 | End: 2023-09-24 | Stop reason: HOSPADM

## 2023-09-18 RX ORDER — CARVEDILOL 25 MG/1
25 TABLET ORAL 2 TIMES DAILY WITH MEALS
Status: DISCONTINUED | OUTPATIENT
Start: 2023-09-18 | End: 2023-09-24 | Stop reason: HOSPADM

## 2023-09-18 RX ORDER — ACETAMINOPHEN 325 MG/1
650 TABLET ORAL EVERY 4 HOURS PRN
Status: DISCONTINUED | OUTPATIENT
Start: 2023-09-18 | End: 2023-09-24 | Stop reason: HOSPADM

## 2023-09-18 RX ORDER — ALBUTEROL SULFATE 2.5 MG/3ML
2.5 SOLUTION RESPIRATORY (INHALATION) EVERY 4 HOURS PRN
Status: DISCONTINUED | OUTPATIENT
Start: 2023-09-18 | End: 2023-09-24 | Stop reason: HOSPADM

## 2023-09-18 RX ORDER — CHOLECALCIFEROL (VITAMIN D3) 125 MCG
5 CAPSULE ORAL NIGHTLY PRN
Status: DISCONTINUED | OUTPATIENT
Start: 2023-09-18 | End: 2023-09-24 | Stop reason: HOSPADM

## 2023-09-18 RX ORDER — APIXABAN 5 MG/1
5 TABLET, FILM COATED ORAL EVERY 12 HOURS SCHEDULED
COMMUNITY
Start: 2023-08-15

## 2023-09-18 RX ORDER — AMIODARONE HYDROCHLORIDE 200 MG/1
200 TABLET ORAL EVERY 12 HOURS SCHEDULED
Status: DISCONTINUED | OUTPATIENT
Start: 2023-09-18 | End: 2023-09-24 | Stop reason: HOSPADM

## 2023-09-18 RX ORDER — NALOXONE HCL 0.4 MG/ML
0.4 VIAL (ML) INJECTION
Status: DISCONTINUED | OUTPATIENT
Start: 2023-09-18 | End: 2023-09-21

## 2023-09-18 RX ORDER — FUROSEMIDE 10 MG/ML
40 INJECTION INTRAMUSCULAR; INTRAVENOUS 2 TIMES DAILY
Status: DISCONTINUED | OUTPATIENT
Start: 2023-09-19 | End: 2023-09-19

## 2023-09-18 RX ORDER — AMIODARONE HYDROCHLORIDE 200 MG/1
1 TABLET ORAL EVERY 12 HOURS SCHEDULED
COMMUNITY
Start: 2023-08-15

## 2023-09-18 RX ORDER — SODIUM CHLORIDE 0.9 % (FLUSH) 0.9 %
10 SYRINGE (ML) INJECTION EVERY 12 HOURS SCHEDULED
Status: DISCONTINUED | OUTPATIENT
Start: 2023-09-18 | End: 2023-09-24 | Stop reason: HOSPADM

## 2023-09-18 RX ORDER — MAGNESIUM OXIDE 400 MG/1
400 TABLET ORAL DAILY
COMMUNITY

## 2023-09-18 RX ORDER — ASPIRIN 81 MG/1
81 TABLET, CHEWABLE ORAL DAILY
Status: DISCONTINUED | OUTPATIENT
Start: 2023-09-19 | End: 2023-09-24 | Stop reason: HOSPADM

## 2023-09-18 RX ORDER — LORAZEPAM 0.5 MG/1
0.5 TABLET ORAL EVERY 8 HOURS PRN
Status: DISCONTINUED | OUTPATIENT
Start: 2023-09-18 | End: 2023-09-20

## 2023-09-18 RX ORDER — ASPIRIN 81 MG/1
81 TABLET, CHEWABLE ORAL DAILY
COMMUNITY

## 2023-09-18 RX ORDER — ACETAMINOPHEN 650 MG/1
650 SUPPOSITORY RECTAL EVERY 4 HOURS PRN
Status: DISCONTINUED | OUTPATIENT
Start: 2023-09-18 | End: 2023-09-24 | Stop reason: HOSPADM

## 2023-09-18 RX ORDER — ISOSORBIDE MONONITRATE 30 MG/1
30 TABLET, EXTENDED RELEASE ORAL DAILY
Status: DISCONTINUED | OUTPATIENT
Start: 2023-09-19 | End: 2023-09-24 | Stop reason: HOSPADM

## 2023-09-18 RX ORDER — NITROGLYCERIN 0.4 MG/1
0.4 TABLET SUBLINGUAL
Status: DISCONTINUED | OUTPATIENT
Start: 2023-09-18 | End: 2023-09-24 | Stop reason: HOSPADM

## 2023-09-18 RX ORDER — SPIRONOLACTONE 25 MG/1
25 TABLET ORAL DAILY
Status: DISCONTINUED | OUTPATIENT
Start: 2023-09-19 | End: 2023-09-24 | Stop reason: HOSPADM

## 2023-09-18 RX ORDER — ONDANSETRON 4 MG/1
4 TABLET, FILM COATED ORAL EVERY 6 HOURS PRN
Status: DISCONTINUED | OUTPATIENT
Start: 2023-09-18 | End: 2023-09-24 | Stop reason: HOSPADM

## 2023-09-18 RX ORDER — ONDANSETRON 2 MG/ML
4 INJECTION INTRAMUSCULAR; INTRAVENOUS EVERY 6 HOURS PRN
Status: DISCONTINUED | OUTPATIENT
Start: 2023-09-18 | End: 2023-09-24 | Stop reason: HOSPADM

## 2023-09-18 RX ORDER — MORPHINE SULFATE 2 MG/ML
2 INJECTION, SOLUTION INTRAMUSCULAR; INTRAVENOUS EVERY 4 HOURS PRN
Status: DISCONTINUED | OUTPATIENT
Start: 2023-09-18 | End: 2023-09-21

## 2023-09-18 RX ORDER — SODIUM CHLORIDE 0.9 % (FLUSH) 0.9 %
10 SYRINGE (ML) INJECTION AS NEEDED
Status: DISCONTINUED | OUTPATIENT
Start: 2023-09-18 | End: 2023-09-24 | Stop reason: HOSPADM

## 2023-09-18 RX ORDER — HYDRALAZINE HYDROCHLORIDE 25 MG/1
25 TABLET, FILM COATED ORAL 3 TIMES DAILY
Status: DISCONTINUED | OUTPATIENT
Start: 2023-09-18 | End: 2023-09-19

## 2023-09-18 RX ORDER — SODIUM CHLORIDE 9 MG/ML
40 INJECTION, SOLUTION INTRAVENOUS AS NEEDED
Status: DISCONTINUED | OUTPATIENT
Start: 2023-09-18 | End: 2023-09-24 | Stop reason: HOSPADM

## 2023-09-18 RX ADMIN — HYDRALAZINE HYDROCHLORIDE 25 MG: 25 TABLET, FILM COATED ORAL at 23:44

## 2023-09-18 RX ADMIN — APIXABAN 5 MG: 5 TABLET, FILM COATED ORAL at 23:44

## 2023-09-18 RX ADMIN — AMIODARONE HYDROCHLORIDE 200 MG: 200 TABLET ORAL at 23:44

## 2023-09-18 RX ADMIN — CARVEDILOL 25 MG: 25 TABLET, FILM COATED ORAL at 23:44

## 2023-09-18 RX ADMIN — Medication 10 ML: at 23:34

## 2023-09-18 RX ADMIN — SACUBITRIL AND VALSARTAN 1 TABLET: 49; 51 TABLET, FILM COATED ORAL at 23:43

## 2023-09-18 NOTE — LETTER
September 20, 2023     Patient: Collin Teresa   YOB: 1965   Date of Visit: 9/18/2023       To Whom It May Concern:    Pt has been admitted to hospital from 09/18/2023 at 18:56 until present time. Please excuse.        Sincerely,        ANNA MARIE Vora/ MAY Conrad, RN-BC, BSN.

## 2023-09-19 ENCOUNTER — APPOINTMENT (OUTPATIENT)
Dept: CARDIOLOGY | Facility: HOSPITAL | Age: 58
DRG: 291 | End: 2023-09-19
Payer: COMMERCIAL

## 2023-09-19 PROBLEM — I50.9 CHF EXACERBATION: Status: RESOLVED | Noted: 2023-09-18 | Resolved: 2023-09-19

## 2023-09-19 PROBLEM — R79.89 ELEVATED TROPONIN: Status: ACTIVE | Noted: 2023-09-19

## 2023-09-19 PROBLEM — R77.8 ELEVATED TROPONIN: Status: ACTIVE | Noted: 2023-09-19

## 2023-09-19 PROBLEM — R09.89 PULMONARY VASCULAR CONGESTION: Status: ACTIVE | Noted: 2023-09-19

## 2023-09-19 PROBLEM — N17.9 AKI (ACUTE KIDNEY INJURY): Status: ACTIVE | Noted: 2023-09-19

## 2023-09-19 LAB
ANION GAP SERPL CALCULATED.3IONS-SCNC: 13 MMOL/L (ref 5–15)
BASOPHILS # BLD AUTO: 0.05 10*3/MM3 (ref 0–0.2)
BASOPHILS NFR BLD AUTO: 0.7 % (ref 0–1.5)
BH CV ECHO MEAS - ACS: 2.49 CM
BH CV ECHO MEAS - AO MAX PG: 5.5 MMHG
BH CV ECHO MEAS - AO MEAN PG: 3 MMHG
BH CV ECHO MEAS - AO ROOT DIAM: 3.9 CM
BH CV ECHO MEAS - AO V2 MAX: 117 CM/SEC
BH CV ECHO MEAS - AO V2 VTI: 23.1 CM
BH CV ECHO MEAS - AVA(I,D): 2.7 CM2
BH CV ECHO MEAS - EDV(CUBED): 286.1 ML
BH CV ECHO MEAS - EDV(MOD-SP2): 263 ML
BH CV ECHO MEAS - EDV(MOD-SP4): 192 ML
BH CV ECHO MEAS - EF(MOD-BP): 26.7 %
BH CV ECHO MEAS - EF(MOD-SP2): 24 %
BH CV ECHO MEAS - EF(MOD-SP4): 26 %
BH CV ECHO MEAS - ESV(CUBED): 190.4 ML
BH CV ECHO MEAS - ESV(MOD-SP2): 200 ML
BH CV ECHO MEAS - ESV(MOD-SP4): 142 ML
BH CV ECHO MEAS - FS: 12.7 %
BH CV ECHO MEAS - IVS/LVPW: 1.07 CM
BH CV ECHO MEAS - IVSD: 1.29 CM
BH CV ECHO MEAS - LA DIMENSION: 5.9 CM
BH CV ECHO MEAS - LAT PEAK E' VEL: 9 CM/SEC
BH CV ECHO MEAS - LV DIASTOLIC VOL/BSA (35-75): 76.4 CM2
BH CV ECHO MEAS - LV MASS(C)D: 385.8 GRAMS
BH CV ECHO MEAS - LV MAX PG: 2.3 MMHG
BH CV ECHO MEAS - LV MEAN PG: 1 MMHG
BH CV ECHO MEAS - LV SYSTOLIC VOL/BSA (12-30): 56.5 CM2
BH CV ECHO MEAS - LV V1 MAX: 75.8 CM/SEC
BH CV ECHO MEAS - LV V1 VTI: 14.8 CM
BH CV ECHO MEAS - LVIDD: 6.6 CM
BH CV ECHO MEAS - LVIDS: 5.8 CM
BH CV ECHO MEAS - LVOT AREA: 4.2 CM2
BH CV ECHO MEAS - LVOT DIAM: 2.31 CM
BH CV ECHO MEAS - LVPWD: 1.2 CM
BH CV ECHO MEAS - MED PEAK E' VEL: 4.6 CM/SEC
BH CV ECHO MEAS - MR MAX PG: 44.2 MMHG
BH CV ECHO MEAS - MR MAX VEL: 332.6 CM/SEC
BH CV ECHO MEAS - MV DEC SLOPE: 293 CM/SEC2
BH CV ECHO MEAS - MV E MAX VEL: 70.3 CM/SEC
BH CV ECHO MEAS - MV MAX PG: 3.3 MMHG
BH CV ECHO MEAS - MV MEAN PG: 1.08 MMHG
BH CV ECHO MEAS - MV P1/2T: 89.5 MSEC
BH CV ECHO MEAS - MV V2 VTI: 18.6 CM
BH CV ECHO MEAS - MVA(P1/2T): 2.46 CM2
BH CV ECHO MEAS - MVA(VTI): 3.3 CM2
BH CV ECHO MEAS - PA V2 MAX: 84.2 CM/SEC
BH CV ECHO MEAS - RAP SYSTOLE: 10 MMHG
BH CV ECHO MEAS - RVDD: 4.6 CM
BH CV ECHO MEAS - RVSP: 43.4 MMHG
BH CV ECHO MEAS - SI(MOD-SP2): 25.1 ML/M2
BH CV ECHO MEAS - SI(MOD-SP4): 19.9 ML/M2
BH CV ECHO MEAS - SV(LVOT): 62.1 ML
BH CV ECHO MEAS - SV(MOD-SP2): 63 ML
BH CV ECHO MEAS - SV(MOD-SP4): 50 ML
BH CV ECHO MEAS - TR MAX PG: 33.4 MMHG
BH CV ECHO MEAS - TR MAX VEL: 289.1 CM/SEC
BH CV ECHO MEASUREMENTS AVERAGE E/E' RATIO: 10.34
BUN SERPL-MCNC: 29 MG/DL (ref 6–20)
BUN/CREAT SERPL: 15.3 (ref 7–25)
CALCIUM SPEC-SCNC: 9.4 MG/DL (ref 8.6–10.5)
CHLORIDE SERPL-SCNC: 103 MMOL/L (ref 98–107)
CO2 SERPL-SCNC: 24 MMOL/L (ref 22–29)
CREAT SERPL-MCNC: 1.9 MG/DL (ref 0.76–1.27)
DEPRECATED RDW RBC AUTO: 49.3 FL (ref 37–54)
EGFRCR SERPLBLD CKD-EPI 2021: 40.4 ML/MIN/1.73
EOSINOPHIL # BLD AUTO: 0.1 10*3/MM3 (ref 0–0.4)
EOSINOPHIL NFR BLD AUTO: 1.4 % (ref 0.3–6.2)
ERYTHROCYTE [DISTWIDTH] IN BLOOD BY AUTOMATED COUNT: 14 % (ref 12.3–15.4)
GEN 5 2HR TROPONIN T REFLEX: 35 NG/L
GLUCOSE SERPL-MCNC: 89 MG/DL (ref 65–99)
HCT VFR BLD AUTO: 45.9 % (ref 37.5–51)
HGB BLD-MCNC: 14.7 G/DL (ref 13–17.7)
IMM GRANULOCYTES # BLD AUTO: 0.02 10*3/MM3 (ref 0–0.05)
IMM GRANULOCYTES NFR BLD AUTO: 0.3 % (ref 0–0.5)
LYMPHOCYTES # BLD AUTO: 1.93 10*3/MM3 (ref 0.7–3.1)
LYMPHOCYTES NFR BLD AUTO: 26.3 % (ref 19.6–45.3)
MCH RBC QN AUTO: 30.7 PG (ref 26.6–33)
MCHC RBC AUTO-ENTMCNC: 32 G/DL (ref 31.5–35.7)
MCV RBC AUTO: 95.8 FL (ref 79–97)
MONOCYTES # BLD AUTO: 0.44 10*3/MM3 (ref 0.1–0.9)
MONOCYTES NFR BLD AUTO: 6 % (ref 5–12)
NEUTROPHILS NFR BLD AUTO: 4.81 10*3/MM3 (ref 1.7–7)
NEUTROPHILS NFR BLD AUTO: 65.3 % (ref 42.7–76)
NRBC BLD AUTO-RTO: 0 /100 WBC (ref 0–0.2)
PLATELET # BLD AUTO: 192 10*3/MM3 (ref 140–450)
PMV BLD AUTO: 11.1 FL (ref 6–12)
POTASSIUM SERPL-SCNC: 4 MMOL/L (ref 3.5–5.2)
RBC # BLD AUTO: 4.79 10*6/MM3 (ref 4.14–5.8)
SODIUM SERPL-SCNC: 140 MMOL/L (ref 136–145)
STJ: 3.3 CM
TROPONIN T DELTA: 0 NG/L
TROPONIN T SERPL HS-MCNC: 35 NG/L
WBC NRBC COR # BLD: 7.35 10*3/MM3 (ref 3.4–10.8)

## 2023-09-19 PROCEDURE — G0378 HOSPITAL OBSERVATION PER HR: HCPCS

## 2023-09-19 PROCEDURE — 85025 COMPLETE CBC W/AUTO DIFF WBC: CPT

## 2023-09-19 PROCEDURE — 93010 ELECTROCARDIOGRAM REPORT: CPT | Performed by: INTERNAL MEDICINE

## 2023-09-19 PROCEDURE — 93306 TTE W/DOPPLER COMPLETE: CPT

## 2023-09-19 PROCEDURE — 25010000002 FUROSEMIDE PER 20 MG

## 2023-09-19 PROCEDURE — 36415 COLL VENOUS BLD VENIPUNCTURE: CPT

## 2023-09-19 PROCEDURE — 80048 BASIC METABOLIC PNL TOTAL CA: CPT

## 2023-09-19 PROCEDURE — 93005 ELECTROCARDIOGRAM TRACING: CPT | Performed by: INTERNAL MEDICINE

## 2023-09-19 PROCEDURE — 25010000002 DOBUTAMINE PER 250 MG: Performed by: INTERNAL MEDICINE

## 2023-09-19 PROCEDURE — 84484 ASSAY OF TROPONIN QUANT: CPT

## 2023-09-19 RX ORDER — FUROSEMIDE 10 MG/ML
40 INJECTION INTRAMUSCULAR; INTRAVENOUS
Status: DISCONTINUED | OUTPATIENT
Start: 2023-09-20 | End: 2023-09-24 | Stop reason: HOSPADM

## 2023-09-19 RX ORDER — ALBUMIN (HUMAN) 12.5 G/50ML
25 SOLUTION INTRAVENOUS
Status: DISCONTINUED | OUTPATIENT
Start: 2023-09-20 | End: 2023-09-24 | Stop reason: HOSPADM

## 2023-09-19 RX ORDER — DOBUTAMINE HYDROCHLORIDE 100 MG/100ML
8 INJECTION INTRAVENOUS CONTINUOUS
Status: DISCONTINUED | OUTPATIENT
Start: 2023-09-19 | End: 2023-09-24 | Stop reason: HOSPADM

## 2023-09-19 RX ADMIN — APIXABAN 5 MG: 5 TABLET, FILM COATED ORAL at 08:20

## 2023-09-19 RX ADMIN — Medication 10 ML: at 08:20

## 2023-09-19 RX ADMIN — ASPIRIN 81 MG: 81 TABLET, CHEWABLE ORAL at 08:20

## 2023-09-19 RX ADMIN — SACUBITRIL AND VALSARTAN 1 TABLET: 49; 51 TABLET, FILM COATED ORAL at 08:19

## 2023-09-19 RX ADMIN — AMIODARONE HYDROCHLORIDE 200 MG: 200 TABLET ORAL at 08:19

## 2023-09-19 RX ADMIN — Medication 10 ML: at 20:57

## 2023-09-19 RX ADMIN — HYDRALAZINE HYDROCHLORIDE 25 MG: 25 TABLET, FILM COATED ORAL at 15:45

## 2023-09-19 RX ADMIN — FUROSEMIDE 40 MG: 10 INJECTION, SOLUTION INTRAVENOUS at 08:19

## 2023-09-19 RX ADMIN — ISOSORBIDE MONONITRATE 30 MG: 30 TABLET, EXTENDED RELEASE ORAL at 08:19

## 2023-09-19 RX ADMIN — AMIODARONE HYDROCHLORIDE 200 MG: 200 TABLET ORAL at 20:57

## 2023-09-19 RX ADMIN — CARVEDILOL 25 MG: 25 TABLET, FILM COATED ORAL at 08:20

## 2023-09-19 RX ADMIN — DOBUTAMINE HYDROCHLORIDE 8 MCG/KG/MIN: 100 INJECTION INTRAVENOUS at 22:38

## 2023-09-19 RX ADMIN — FUROSEMIDE 40 MG: 10 INJECTION, SOLUTION INTRAVENOUS at 14:13

## 2023-09-19 RX ADMIN — HYDRALAZINE HYDROCHLORIDE 25 MG: 25 TABLET, FILM COATED ORAL at 08:19

## 2023-09-19 RX ADMIN — SACUBITRIL AND VALSARTAN 1 TABLET: 49; 51 TABLET, FILM COATED ORAL at 20:57

## 2023-09-19 RX ADMIN — SPIRONOLACTONE 25 MG: 25 TABLET ORAL at 08:19

## 2023-09-19 RX ADMIN — APIXABAN 5 MG: 5 TABLET, FILM COATED ORAL at 20:57

## 2023-09-19 NOTE — PROGRESS NOTES
[facility]   INPATIENT PROGRESS NOTE      Patient Name: Collin Teresa  Date of Admission: 9/18/2023  Today's Date: 09/19/23  Length of Stay: 0  Primary Care Physician: Zakiya George DO    Subjective   Chief Complaint: soa  HPI   58-year-old male with systolic CHF EF 21-25% per echo 4/13/2020 most recent in the system, dilated cardiomyopathy, chronic A-fib on Eliquis, diabetes mellitus type 2, essential hypertension who follows with Dr Ricardo in the outpatient setting who presented to the emergency department with complaints of increased shortness of breath worse with exertion.  He is found to be in decompensated heart failure received diuresis in the ED.  At the time of my evaluation he reports he feels better with less dyspnea and is resting comfortably on the ED stretcher in no acute distress.        Review of Systems   Respiratory:  Positive for shortness of breath.    Cardiovascular:  Positive for leg swelling.      All pertinent negatives and positives are as above. All other systems have been reviewed and are negative unless otherwise stated.     Objective    Temp:  [98 °F (36.7 °C)] 98 °F (36.7 °C)  Heart Rate:  [] 64  Resp:  [16-20] 20  BP: (113-159)/() 132/64  Physical Exam  Vitals reviewed.   Constitutional:       Appearance: Normal appearance.   HENT:      Head: Normocephalic and atraumatic.      Nose: Nose normal.      Mouth/Throat:      Pharynx: Oropharynx is clear.   Eyes:      Conjunctiva/sclera: Conjunctivae normal.   Cardiovascular:      Rate and Rhythm: Normal rate and regular rhythm.      Pulses: Normal pulses.      Heart sounds: Normal heart sounds.   Pulmonary:      Effort: Pulmonary effort is normal.      Breath sounds: Rales present.   Abdominal:      General: Bowel sounds are normal. There is no distension.      Tenderness: There is no abdominal tenderness.   Musculoskeletal:         General: Normal range of  motion.   Skin:     General: Skin is warm.      Capillary Refill: Capillary refill takes less than 2 seconds.   Neurological:      Mental Status: He is alert and oriented to person, place, and time. Mental status is at baseline.   Psychiatric:         Thought Content: Thought content normal.           Results Review:  I have reviewed the labs, radiology results, and diagnostic studies.    Laboratory Data:   Results from last 7 days   Lab Units 09/19/23  0521 09/18/23  0320   WBC 10*3/mm3 7.35 6.74   HEMOGLOBIN g/dL 14.7 15.6   HEMATOCRIT % 45.9 48.4   PLATELETS 10*3/mm3 192 198        Results from last 7 days   Lab Units 09/19/23  0521 09/18/23  1520   SODIUM mmol/L 140 141   POTASSIUM mmol/L 4.0 4.6   CHLORIDE mmol/L 103 104   CO2 mmol/L 24.0 26.0   BUN mg/dL 29* 33*   CREATININE mg/dL 1.90* 2.22*   CALCIUM mg/dL 9.4 9.6   BILIRUBIN mg/dL  --  1.1   ALK PHOS U/L  --  68   ALT (SGPT) U/L  --  22   AST (SGOT) U/L  --  21   GLUCOSE mg/dL 89 105*       Culture Data:   No results found for: BLOODCX  No results found for: URINECX  No results found for: RESPCX  No results found for: WOUNDCX  No results found for: STOOLCX  No components found for: BODYFLD    Radiology Data:   Imaging Results (Last 24 Hours)       Procedure Component Value Units Date/Time    XR Chest 1 View [[id number]] Collected: 09/18/23 2220     Updated: 09/18/23 2223    Narrative:      XR CHEST 1 VIEW    HISTORY: elevated BNP    COMPARISON: None.    FINDINGS:  Frontal view of the chest was obtained.    There is mild vascular congestion.. The cardiac silhouette is enlarged. There is  no significant pleural effusion or pneumothorax.    The osseous structures and surrounding soft tissues demonstrate no acute  abnormality.    Upper abdomen is unremarkable.        Impression:      1. Mild vascular congestion without focal infiltrate.                I have reviewed the patient's current medications.     Assessment/Plan     I have utilized all available  immediate resources to obtain, update, or review the patient's current medications (including all prescriptions, over-the-counter products, herbals, cannabis/cannabidiol products, and vitamin/mineral/dietary (nutritional) supplements).      Active Hospital Problems    Diagnosis     AMRIT (acute kidney injury)     Elevated troponin     Pulmonary vascular congestion     Peripheral edema      Assessment/plan  1.  Acute on chronic heart failure with reduced EF  -Patient with chronic kidney disease, creatinine improved to 1.9 today.  -Continue Lasix 40 mg IV twice daily for diuresis  -Patient follows with Dr. Ricardo, appreciate his consultation    2.  CKD presumed stage III  -Most recent labs are from 2020 showed creatinine around 1.4 baseline  -Renally adjust medications and avoid nephrotoxins    3.  Diabetes mellitus type 2  -Blood sugars 3 times daily AC with sliding scale insulin coverage    4.  Dilated cardiomyopathy  -Continue home meds, appreciate cardiology's assistance    5.  Essential hypertension  -Continue home meds    6.  Chronic A-fib  -Continue Eliquis, carvedilol, Pacerone    VTE prophylaxis Eliquis          Medical Decision Making  Number and Complexity of problems: Multiple moderate, 1 high    Conditions and Status:        Condition is improving.     [facility] Data  External documents reviewed: Game Closure, Care Everywhere  My EKG interpretation: EKG shows A-fib with controlled ventricular rate.  No acute ischemic changes noted.  My plain film interpretation: Chest x-ray shows mild pulmonary vascular congestion  Tests considered but not ordered: None today       Discussed with: Patient, Dr Ricardo     Treatment Plan  As ABOVE    Care Planning  Shared decision making: With patient  Code status and discussions: full    Disposition  Social Determinants of Health that impact treatment or disposition: n/a  I expect the patient to be discharged to home in 2-3 days.      I confirmed that the patient's Advance Care Plan is  present, code status is documented, or surrogate decision maker is listed in the patient's medical record.   ________________________________        Copied text in this note has been reviewed and is accurate as of 09/19/23       Electronically signed by ANNA MARIE Bell, 09/19/23, 10:58 CDT.

## 2023-09-19 NOTE — H&P
[facility] Admission      Date of Admission: 9/18/2023      Primary Care Physician: Zakiya George DO    Chief Complaint: Dyspnoea     HPI: This pleasant gentleman has experienced 2 weeks of worsening dyspnea.  He felt that approximately a week ago, with the use of his diuretics, he was getting better, however, he has proceeded to deteriorate again clinically.  Patient has developed worsening of his peripheral edema as well but denies any other cardiorespiratory symptoms on review.      Concurrent Medical History:  has a past medical history of Abdominal pain, Cardiomegaly, Chest pain, Essential hypertension, History of EKG (09/19/2016), History of toxic effects, Morbid obesity, and Peripheral edema.    Past Surgical History:  has a past surgical history that includes Cardiac catheterization and Esophagogastroduodenoscopy.    Family History: family history includes Diabetes in an other family member; Hypertension in an other family member.     Social History:  reports that he has never smoked. He has never used smokeless tobacco. He reports that he does not drink alcohol and does not use drugs.    Allergies:   Allergies   Allergen Reactions    Dexamethasone Other (See Comments)       Medications:   Prior to Admission medications    Medication Sig Start Date End Date Taking? Authorizing Provider   amiodarone (PACERONE) 200 MG tablet Take 1 tablet by mouth Every 12 (Twelve) Hours. 8/15/23  Yes Provider, MD Yaneth   carvedilol (COREG) 25 MG tablet TAKE ONE TABLET BY MOUTH TWO TIMES A DAY WITH MEALS 6/20/23  Yes Norma Olivarez APRN   Corlanor 5 MG tablet tablet TAKE ONE TABLET BY MOUTH TWO TIMES A DAY WITH MEALS 3/23/22  Yes Noemi Key APRN   Eliquis 5 MG tablet tablet Take 1 tablet by mouth Every 12 (Twelve) Hours. 8/15/23  Yes Provider, MD Yaneth   furosemide (LASIX) 40 MG tablet Take 1 tablet by mouth Daily As Needed (weight gain of 3lbs  "overnight or 5lbs in a week.). 1/19/22  Yes Noemi Key APRN   hydrALAZINE (APRESOLINE) 25 MG tablet Take 1 tablet by mouth 3 (Three) Times a Day. 8/31/21  Yes Noemi Key APRN   isosorbide mononitrate (IMDUR) 30 MG 24 hr tablet TAKE 1 TABLET BY MOUTH DAILY. 10/4/22  Yes Norma Olivarez APRN   spironolactone (ALDACTONE) 25 MG tablet Take 1 tablet by mouth Daily. 8/23/22  Yes Norma Olivarez APRN   albuterol sulfate  (90 Base) MCG/ACT inhaler Inhale 2 puffs Every 4 (Four) Hours As Needed for Wheezing or Shortness of Air. 1/20/22   Noemi Key APRN   aspirin 81 MG chewable tablet Chew 1 tablet Daily.    ProviderYaneth MD   brompheniramine-pseudoephedrine-DM 30-2-10 MG/5ML syrup Take 2.5 mL by mouth 4 (Four) Times a Day As Needed for Congestion or Allergies. 1/20/22   Noemi Key APRN   magnesium oxide (MAG-OX) 400 MG tablet Take 1 tablet by mouth Daily.    ProviderYaneth MD   promethazine (PHENERGAN) 12.5 MG tablet Take 1 tablet by mouth Every 6 (Six) Hours As Needed for Nausea or Vomiting. 8/31/21 9/18/23  Noemi Key APRN   promethazine (PHENERGAN) 25 MG tablet TAKE ONE TABLET BY MOUTH EVERY SIX HOURS AS NEEDED FOR NAUSEA 10/27/21 9/18/23  Provider, MD Yaneth       Review of Systems:  Review of Systems   Constitutional:  Negative for chills, diaphoresis and fever.   HENT:  Negative for congestion, ear pain, rhinorrhea, sinus pressure, sneezing and sore throat.         \"Allergies\"   Respiratory:  Positive for shortness of breath. Negative for cough, wheezing and stridor.         Chronic cough and phlegm   Cardiovascular:  Positive for leg swelling. Negative for chest pain and palpitations.   Gastrointestinal:  Negative for abdominal distention, abdominal pain, constipation, diarrhea, nausea and vomiting.   Genitourinary:  Negative for difficulty urinating, dysuria, enuresis, frequency, hematuria and urgency.   Musculoskeletal:  Negative for " arthralgias, joint swelling and myalgias.   Skin:  Negative for color change, rash and wound.   Neurological:  Negative for dizziness, syncope, light-headedness and headaches.   Psychiatric/Behavioral:  Negative for agitation, confusion and hallucinations.     Otherwise complete ROS is negative except as mentioned above.    Physical Exam:   Temp:  [98 °F (36.7 °C)] 98 °F (36.7 °C)  Heart Rate:  [] 64  Resp:  [16-20] 20  BP: (113-159)/() 132/64  Physical Exam  Vitals and nursing note reviewed.   Constitutional:       General: He is awake. He is not in acute distress.     Appearance: He is morbidly obese. He is not toxic-appearing or diaphoretic.   HENT:      Head: Normocephalic and atraumatic.      Nose: Nose normal. No congestion or rhinorrhea.      Mouth/Throat:      Pharynx: Oropharynx is clear. No oropharyngeal exudate or posterior oropharyngeal erythema.   Eyes:      General:         Right eye: No discharge.         Left eye: No discharge.      Conjunctiva/sclera: Conjunctivae normal.      Pupils: Pupils are equal, round, and reactive to light.   Cardiovascular:      Rate and Rhythm: Normal rate and regular rhythm.      Heart sounds: Normal heart sounds, S1 normal and S2 normal. Heart sounds not distant.     No friction rub. No gallop.   Pulmonary:      Effort: Pulmonary effort is normal. No respiratory distress.      Breath sounds: Normal breath sounds. No stridor. No rhonchi or rales.   Abdominal:      General: There is no distension.      Palpations: Abdomen is soft. There is no mass.      Hernia: No hernia is present.   Musculoskeletal:         General: No tenderness or signs of injury.      Right lower leg: 3+ Pitting Edema present.      Left lower leg: 3+ Pitting Edema present.   Skin:     Capillary Refill: Capillary refill takes 2 to 3 seconds.      Coloration: Skin is not jaundiced.      Findings: No bruising, erythema or rash.   Neurological:      General: No focal deficit present.       Mental Status: He is alert and oriented to person, place, and time. Mental status is at baseline.      Cranial Nerves: No cranial nerve deficit.   Psychiatric:         Behavior: Behavior normal. Behavior is cooperative.         Thought Content: Thought content normal.         Judgment: Judgment normal.         Results Reviewed:  I have personally reviewed current lab, radiology, and data and agree with results.  Lab Results (last 24 hours)       Procedure Component Value Units Date/Time    Basic Metabolic Panel [[id number]]  (Abnormal) Collected: 09/19/23 0521    Specimen: Blood Updated: 09/19/23 0632     Glucose 89 mg/dL      BUN 29 mg/dL      Creatinine 1.90 mg/dL      Sodium 140 mmol/L      Potassium 4.0 mmol/L      Comment: Slight hemolysis detected by analyzer. Results may be affected.        Chloride 103 mmol/L      CO2 24.0 mmol/L      Calcium 9.4 mg/dL      BUN/Creatinine Ratio 15.3     Anion Gap 13.0 mmol/L      eGFR 40.4 mL/min/1.73     Narrative:      GFR Normal >60  Chronic Kidney Disease <60  Kidney Failure <15      CBC & Differential [[id number]]  (Normal) Collected: 09/19/23 0521    Specimen: Blood Updated: 09/19/23 0615    Narrative:      The following orders were created for panel order CBC & Differential.  Procedure                               Abnormality         Status                     ---------                               -----------         ------                     CBC Auto Differential[[id number]]        Normal              Final result                 Please view results for these tests on the individual orders.    CBC Auto Differential [[id number]]  (Normal) Collected: 09/19/23 0521    Specimen: Blood Updated: 09/19/23 0615     WBC 7.35 10*3/mm3      RBC 4.79 10*6/mm3      Hemoglobin 14.7 g/dL      Hematocrit 45.9 %      MCV 95.8 fL      MCH 30.7 pg      MCHC 32.0 g/dL      RDW 14.0 %      RDW-SD 49.3 fl      MPV 11.1 fL      Platelets 192 10*3/mm3      Neutrophil % 65.3 %       Lymphocyte % 26.3 %      Monocyte % 6.0 %      Eosinophil % 1.4 %      Basophil % 0.7 %      Immature Grans % 0.3 %      Neutrophils, Absolute 4.81 10*3/mm3      Lymphocytes, Absolute 1.93 10*3/mm3      Monocytes, Absolute 0.44 10*3/mm3      Eosinophils, Absolute 0.10 10*3/mm3      Basophils, Absolute 0.05 10*3/mm3      Immature Grans, Absolute 0.02 10*3/mm3      nRBC 0.0 /100 WBC     Extra Tubes [[id number]] Collected: 09/18/23 1924    Specimen: Blood, Venous Line Updated: 09/18/23 2031    Narrative:      The following orders were created for panel order Extra Tubes.  Procedure                               Abnormality         Status                     ---------                               -----------         ------                     Lavender Top[[id number]]                                     Final result               Gold Top - SST[[id number]]                                   Final result               Light Blue Top[[id number]]                                   Final result                 Please view results for these tests on the individual orders.    Gold Top - SST [[id number]] Collected: 09/18/23 1924    Specimen: Blood Updated: 09/18/23 2031     Extra Tube Hold for add-ons.     Comment: Auto resulted.       Light Blue Top [[id number]] Collected: 09/18/23 1924    Specimen: Blood Updated: 09/18/23 2031     Extra Tube Hold for add-ons.     Comment: Auto resulted       Lavender Top [[id number]] Collected: 09/18/23 1924    Specimen: Blood Updated: 09/18/23 2031     Extra Tube hold for add-on     Comment: Auto resulted       Single High Sensitivity Troponin T [[id number]]  (Abnormal) Collected: 09/18/23 1924    Specimen: Blood Updated: 09/18/23 2001     HS Troponin T 38 ng/L     Narrative:      High Sensitive Troponin T Reference Range:  <10.0 ng/L- Negative Female for AMI  <15.0 ng/L- Negative Male for AMI  >=10 - Abnormal Female indicating possible myocardial injury.  >=15 - Abnormal Male indicating possible  myocardial injury.   Clinicians would have to utilize clinical acumen, EKG, Troponin, and serial changes to determine if it is an Acute Myocardial Infarction or myocardial injury due to an underlying chronic condition.         Magnesium [[id number]]  (Normal) Collected: 09/18/23 1924    Specimen: Blood Updated: 09/18/23 1945     Magnesium 2.3 mg/dL           Imaging Results (Last 24 Hours)       Procedure Component Value Units Date/Time    XR Chest 1 View [[id number]] Collected: 09/18/23 2220     Updated: 09/18/23 2223    Narrative:      XR CHEST 1 VIEW    HISTORY: elevated BNP    COMPARISON: None.    FINDINGS:  Frontal view of the chest was obtained.    There is mild vascular congestion.. The cardiac silhouette is enlarged. There is  no significant pleural effusion or pneumothorax.    The osseous structures and surrounding soft tissues demonstrate no acute  abnormality.    Upper abdomen is unremarkable.        Impression:      1. Mild vascular congestion without focal infiltrate.                  Assessment:    Active Hospital Problems    Diagnosis     AMRIT (acute kidney injury)     Elevated troponin     Pulmonary vascular congestion     Peripheral edema          Medical Decision Making  Number and Complexity of problems: Full - high  Differential Diagnosis: As above    Conditions and Status:        Condition is at treatment goal.     [facility] Data  External documents reviewed: Chart review and care everywhere clinical summaries from [facility]  My EKG interpretation: Sinus arrhythmia with left bundle branch block pattern and QTc prolongation.  There are no acute ST-T wave changes  My plain film interpretation: Chest x-ray reveals pulmonary vascular congestion but no other acute intrathoracic pathology  Tests considered but not ordered: Nil     Decision rules/scores evaluated (example [id number], Wells, etc): NYHA     Discussed with: the patient     Treatment Plan  Pulmonary vascular congestion and peripheral edema  We  will start the patient on IV Lasix, will have to be careful with the dosing of this regimen as the patient has an acute kidney injury.  I will also start the patient on Entresto, although it can, we will have to be careful to monitor the patient's renal function.  Optimization of goal-directed medical therapies for the patient's CHF should be continued by my day colleagues/cardiology and consideration for starting the patient on Invokana (probably as an outpatient) would be prudent as a step toward the goal.  To repeat the patient's lab work focusing particularly on his renal function as well as trending of BNP.  I have ordered an echocardiogram to be performed in the a.m.  Daily weights will be performed.  Patient will be on a fluid restriction.  I have ordered strict intake and output  AMRIT  As noted above we will have to monitor the patient's renal function throughout admission and carefully consider how aggressive we are with diuresis.  Elevated troponin  We will perform serial troponins and EKGs to ensure there is no component of ischemic heart disease in this patient's presentation.  We will involve the cardiologist start the patient on ACS protocol as clinically appropriate.        Care Planning  Shared decision making: with the patient  Code status and discussions: Full    Disposition  Social Determinants of Health that impact treatment or disposition: Nil  I expect the patient to be discharged to Home in 2-4 days.       Dickson Nichols MD    Electronically signed by Dickson Nichols MD, 09/19/23, 8:51 AM CDT.

## 2023-09-19 NOTE — ED NOTES
"Nursing report ED to floor  Collin Teresa  58 y.o.  male    HPI:   Chief Complaint   Patient presents with    Leg Swelling    Shortness of Breath    Abnormal Lab       Admitting doctor:   Dickson Nichols MD    Consulting provider(s):  Consults       No orders found from 8/20/2023 to 9/19/2023.             Admitting diagnosis:   The encounter diagnosis was Acute on chronic systolic congestive heart failure.    Code status:   Current Code Status       Date Active Code Status Order ID Comments User Context       Not on file            Allergies:   Dexamethasone    Intake and Output  No intake or output data in the 24 hours ending 09/18/23 2138    Weight:       09/18/23 1848   Weight: (!) 143 kg (316 lb)       Most recent vitals:   Vitals:    09/18/23 1848 09/18/23 1958 09/18/23 2052 09/18/23 2100   BP: 137/75 133/97 154/96 157/92   BP Location: Right arm      Patient Position: Sitting      Pulse: (!) 44 65 72 84   Resp: 16 18 18 20   Temp: 98 °F (36.7 °C)      TempSrc: Oral      SpO2: 96% 98% 98% 100%   Weight: (!) 143 kg (316 lb)      Height: 177.8 cm (70\")        Oxygen Therapy: room air    Active LDAs/IV Access:   Lines, Drains & Airways       Active LDAs       Name Placement date Placement time Site Days    Peripheral IV 09/18/23 1922 Anterior;Left Forearm 09/18/23 1922  Forearm  less than 1                    Labs (abnormal labs have a star):   Labs Reviewed   SINGLE HSTROPONIN T - Abnormal; Notable for the following components:       Result Value    HS Troponin T 38 (*)     All other components within normal limits    Narrative:     High Sensitive Troponin T Reference Range:  <10.0 ng/L- Negative Female for AMI  <15.0 ng/L- Negative Male for AMI  >=10 - Abnormal Female indicating possible myocardial injury.  >=15 - Abnormal Male indicating possible myocardial injury.   Clinicians would have to utilize clinical acumen, EKG, Troponin, and serial changes to determine if it is an Acute Myocardial " Infarction or myocardial injury due to an underlying chronic condition.        MAGNESIUM - Normal   EXTRA TUBES    Narrative:     The following orders were created for panel order Extra Tubes.  Procedure                               Abnormality         Status                     ---------                               -----------         ------                     Lavender Top[[id number]]                                     Final result               Gold Top - SST[[id number]]                                   Final result               Light Blue Top[[id number]]                                   Final result                 Please view results for these tests on the individual orders.   LAVENDER TOP   GOLD TOP - SST   LIGHT BLUE TOP       Meds given in ED:   Medications - No data to display  No current facility-administered medications for this encounter.       NIH Stroke Scale:       Isolation/Infection(s):  No active isolations   No active infections     COVID Testing  Collected   Resulted     Nursing report ED to floor:  Mental status: alert and orientedx4  Ambulatory status: independent  Precautions: n/a    ED nurse phone extentsifi- [phone]

## 2023-09-19 NOTE — ED PROVIDER NOTES
Subjective   History of Present Illness  Patient presents on advice from his primary care after labs revealed elevated creatinine and elevated troponin level today.  Patient went to his primary care today because on Friday, he became very short of breath and fatigued and has had a clear productive cough.  He endorses increasing bilateral lower extremity edema the last 2 weeks.  He saw Dr. Ricardo 2 weeks ago and was told he had a irregular heart rate at that time and started on some new medications.  Patient follows Dr. Ricardo for CHF.  He denies fever chills, abdominal pain, nausea or vomiting.    Review of Systems   Constitutional:  Positive for activity change and fatigue. Negative for chills and fever.   Respiratory:  Positive for cough.    Cardiovascular:  Positive for leg swelling.   Gastrointestinal:  Negative for nausea.   All other systems reviewed and are negative.    Past Medical History:   Diagnosis Date    Abdominal pain     Cardiomegaly     Chest pain     Essential hypertension     History of EKG 09/19/2016    care center    History of toxic effects     carbon monxide    Morbid obesity     Peripheral edema        Allergies   Allergen Reactions    Dexamethasone Other (See Comments)       Past Surgical History:   Procedure Laterality Date    CARDIAC CATHETERIZATION      2011 no cad    ENDOSCOPY         Family History   Problem Relation Age of Onset    Diabetes Other     Hypertension Other     Coronary artery disease Neg Hx        Social History     Socioeconomic History    Marital status:    Tobacco Use    Smoking status: Never    Smokeless tobacco: Never   Vaping Use    Vaping Use: Never used   Substance and Sexual Activity    Alcohol use: No    Drug use: No    Sexual activity: Defer           Objective   Physical Exam  Vitals and nursing note reviewed.   HENT:      Head: Normocephalic.      Mouth/Throat:      Mouth: Mucous membranes are moist.   Eyes:      Extraocular Movements: Extraocular  movements intact.   Cardiovascular:      Rate and Rhythm: Normal rate. Rhythm irregular.      Pulses: Normal pulses.   Pulmonary:      Effort: Pulmonary effort is normal.      Breath sounds: Examination of the right-lower field reveals rales. Examination of the left-lower field reveals rales. Rales present.   Chest:      Chest wall: No tenderness.   Abdominal:      Palpations: Abdomen is soft.   Musculoskeletal:      Cervical back: Normal range of motion.      Right lower leg: Edema (2+ prox knee distally) present.      Left lower leg: Edema (2+ prox knee distally) present.   Skin:     General: Skin is warm.      Capillary Refill: Capillary refill takes less than 2 seconds.   Neurological:      General: No focal deficit present.      Mental Status: He is alert.   Psychiatric:         Mood and Affect: Mood normal.       ECG 12 Lead      Date/Time: 9/18/2023 6:49 PM  Performed by: Kevin Mcdaniel MD  Authorized by: Kevin Mcdaniel MD   Interpreted by physician  Comparison: compared with previous ECG from 8/21/2021  Rhythm: atrial fibrillation  Comments: A-fib ventricular rate 81 bpm,  ms, QTc 47 ms, no ST elevations.  No STEMI.  A-fib is new from previous EKG on file.  This is my independent interpretation    ECG 12 Lead      Date/Time: 9/18/2023 7:25 PM  Performed by: Keivn Mcdaniel MD  Authorized by: Kevin Mcdaniel MD   Interpreted by physician  Comparison: compared with previous ECG from 9/18/2023  Rhythm: atrial fibrillation  Comments: A fib ventricular rate 80 bpm,  ms, QTc 512 ms, no ST elevations.  No STEMI.  A-fib persists.  This is my independent interpretation.             ED Course           Vitals:    09/19/23 0330 09/19/23 0400 09/19/23 0430 09/19/23 0544   BP: 159/85 141/74 144/77 152/65   Pulse: 62 66 62 61   Resp: 18 18 18 20   Temp:       TempSrc:       SpO2: 96% 97% 95% 97%   Weight:       Height:          Lab Results (last 24 hours)       Procedure Component Value  Units Date/Time    Extra Tubes [[id number]] Collected: 09/18/23 1924    Specimen: Blood, Venous Line Updated: 09/18/23 2031    Narrative:      The following orders were created for panel order Extra Tubes.  Procedure                               Abnormality         Status                     ---------                               -----------         ------                     Lavender Top[[id number]]                                     Final result               Gold Top - SST[[id number]]                                   Final result               Light Blue Top[[id number]]                                   Final result                 Please view results for these tests on the individual orders.    Gold Top - SST [[id number]] Collected: 09/18/23 1924    Specimen: Blood Updated: 09/18/23 2031     Extra Tube Hold for add-ons.     Comment: Auto resulted.       Light Blue Top [[id number]] Collected: 09/18/23 1924    Specimen: Blood Updated: 09/18/23 2031     Extra Tube Hold for add-ons.     Comment: Auto resulted       Lavender Top [[id number]] Collected: 09/18/23 1924    Specimen: Blood Updated: 09/18/23 2031     Extra Tube hold for add-on     Comment: Auto resulted       Single High Sensitivity Troponin T [[id number]]  (Abnormal) Collected: 09/18/23 1924    Specimen: Blood Updated: 09/18/23 2001     HS Troponin T 38 ng/L     Narrative:      High Sensitive Troponin T Reference Range:  <10.0 ng/L- Negative Female for AMI  <15.0 ng/L- Negative Male for AMI  >=10 - Abnormal Female indicating possible myocardial injury.  >=15 - Abnormal Male indicating possible myocardial injury.   Clinicians would have to utilize clinical acumen, EKG, Troponin, and serial changes to determine if it is an Acute Myocardial Infarction or myocardial injury due to an underlying chronic condition.         Magnesium [[id number]]  (Normal) Collected: 09/18/23 1924    Specimen: Blood Updated: 09/18/23 1945     Magnesium 2.3 mg/dL            XR Chest 1  View    Result Date: 9/18/2023  1. Mild vascular congestion without focal infiltrate.                                     Medical Decision Making  Patient with lower extremity edema, mild pulmonary vascular congestion, elevated BNP, and elevated creatinine.  No recent baseline labs for comparison.  Patient with stable vital signs on room air while in department.  Discussed case with hospitalist who accepts patient for admission.    Problems Addressed:  Acute on chronic systolic congestive heart failure: complicated acute illness or injury    Amount and/or Complexity of Data Reviewed  Labs: ordered.  Radiology: ordered.  ECG/medicine tests: ordered and independent interpretation performed.    Risk  Decision regarding hospitalization.        Final diagnoses:   Acute on chronic systolic congestive heart failure       ED Disposition  ED Disposition       ED Disposition   Decision to Admit    Condition   --    Comment   Level of Care: Telemetry [5]   Diagnosis: CHF exacerbation [382051]   Admitting Physician: AUREA ENCARNACION [[id number]]   Attending Physician: AUREA ENCARNACION [[id number]]                 No follow-up provider specified.       Medication List      No changes were made to your prescriptions during this visit.       This document has been electronically signed by Kevin Mcdaniel MD on September 19, 2023 05:55 CDT  .si  [email or web address]  Part of this note may be an electronic transcription/translation of spoken language to printed text using the Dragon Dictation System.          Kevin Mcdaniel MD  09/19/23 0572     No

## 2023-09-20 ENCOUNTER — APPOINTMENT (OUTPATIENT)
Dept: ULTRASOUND IMAGING | Facility: HOSPITAL | Age: 58
DRG: 291 | End: 2023-09-20
Payer: COMMERCIAL

## 2023-09-20 ENCOUNTER — APPOINTMENT (OUTPATIENT)
Dept: NUCLEAR MEDICINE | Facility: HOSPITAL | Age: 58
DRG: 291 | End: 2023-09-20
Payer: COMMERCIAL

## 2023-09-20 PROBLEM — I50.9 HEART FAILURE, UNSPECIFIED: Status: ACTIVE | Noted: 2023-09-20

## 2023-09-20 LAB
ANION GAP SERPL CALCULATED.3IONS-SCNC: 11 MMOL/L (ref 5–15)
BACTERIA UR QL AUTO: ABNORMAL /HPF
BILIRUB UR QL STRIP: NEGATIVE
BUN SERPL-MCNC: 23 MG/DL (ref 6–20)
BUN/CREAT SERPL: 12.1 (ref 7–25)
CALCIUM SPEC-SCNC: 9 MG/DL (ref 8.6–10.5)
CHLORIDE SERPL-SCNC: 100 MMOL/L (ref 98–107)
CLARITY UR: CLEAR
CO2 SERPL-SCNC: 26 MMOL/L (ref 22–29)
COLOR UR: YELLOW
CREAT SERPL-MCNC: 1.9 MG/DL (ref 0.76–1.27)
EGFRCR SERPLBLD CKD-EPI 2021: 40.4 ML/MIN/1.73
GLUCOSE SERPL-MCNC: 116 MG/DL (ref 65–99)
GLUCOSE UR STRIP-MCNC: NEGATIVE MG/DL
HGB UR QL STRIP.AUTO: NEGATIVE
HYALINE CASTS UR QL AUTO: ABNORMAL /LPF
KETONES UR QL STRIP: NEGATIVE
LEUKOCYTE ESTERASE UR QL STRIP.AUTO: NEGATIVE
MAGNESIUM SERPL-MCNC: 1.9 MG/DL (ref 1.6–2.6)
NITRITE UR QL STRIP: NEGATIVE
NT-PROBNP SERPL-MCNC: 1097 PG/ML (ref 0–900)
PH UR STRIP.AUTO: 7 [PH] (ref 5–9)
POTASSIUM SERPL-SCNC: 3.6 MMOL/L (ref 3.5–5.2)
PROT UR QL STRIP: NEGATIVE
RBC # UR STRIP: ABNORMAL /HPF
REF LAB TEST METHOD: ABNORMAL
SODIUM SERPL-SCNC: 137 MMOL/L (ref 136–145)
SODIUM UR-SCNC: 86 MMOL/L
SP GR UR STRIP: 1.01 (ref 1–1.03)
SQUAMOUS #/AREA URNS HPF: ABNORMAL /HPF
UROBILINOGEN UR QL STRIP: NORMAL
WBC # UR STRIP: ABNORMAL /HPF
WHOLE BLOOD HOLD SPECIMEN: NORMAL
WHOLE BLOOD HOLD SPECIMEN: NORMAL

## 2023-09-20 PROCEDURE — 36415 COLL VENOUS BLD VENIPUNCTURE: CPT

## 2023-09-20 PROCEDURE — 0 TECHNETIUM SESTAMIBI

## 2023-09-20 PROCEDURE — 80048 BASIC METABOLIC PNL TOTAL CA: CPT | Performed by: INTERNAL MEDICINE

## 2023-09-20 PROCEDURE — 84300 ASSAY OF URINE SODIUM: CPT | Performed by: INTERNAL MEDICINE

## 2023-09-20 PROCEDURE — 78452 HT MUSCLE IMAGE SPECT MULT: CPT | Performed by: INTERNAL MEDICINE

## 2023-09-20 PROCEDURE — 83735 ASSAY OF MAGNESIUM: CPT | Performed by: INTERNAL MEDICINE

## 2023-09-20 PROCEDURE — P9047 ALBUMIN (HUMAN), 25%, 50ML: HCPCS | Performed by: INTERNAL MEDICINE

## 2023-09-20 PROCEDURE — 25010000002 DOBUTAMINE PER 250 MG: Performed by: INTERNAL MEDICINE

## 2023-09-20 PROCEDURE — 76775 US EXAM ABDO BACK WALL LIM: CPT

## 2023-09-20 PROCEDURE — 25010000002 ALBUMIN HUMAN 25% PER 50 ML: Performed by: INTERNAL MEDICINE

## 2023-09-20 PROCEDURE — A9500 TC99M SESTAMIBI: HCPCS

## 2023-09-20 PROCEDURE — 81001 URINALYSIS AUTO W/SCOPE: CPT | Performed by: INTERNAL MEDICINE

## 2023-09-20 PROCEDURE — 93017 CV STRESS TEST TRACING ONLY: CPT

## 2023-09-20 PROCEDURE — 83880 ASSAY OF NATRIURETIC PEPTIDE: CPT

## 2023-09-20 PROCEDURE — 78452 HT MUSCLE IMAGE SPECT MULT: CPT

## 2023-09-20 PROCEDURE — 25010000002 FUROSEMIDE PER 20 MG: Performed by: INTERNAL MEDICINE

## 2023-09-20 RX ADMIN — AMIODARONE HYDROCHLORIDE 200 MG: 200 TABLET ORAL at 21:45

## 2023-09-20 RX ADMIN — FUROSEMIDE 40 MG: 40 INJECTION, SOLUTION INTRAMUSCULAR; INTRAVENOUS at 17:14

## 2023-09-20 RX ADMIN — APIXABAN 5 MG: 5 TABLET, FILM COATED ORAL at 08:19

## 2023-09-20 RX ADMIN — FUROSEMIDE 40 MG: 40 INJECTION, SOLUTION INTRAMUSCULAR; INTRAVENOUS at 08:59

## 2023-09-20 RX ADMIN — ISOSORBIDE MONONITRATE 30 MG: 30 TABLET, EXTENDED RELEASE ORAL at 08:19

## 2023-09-20 RX ADMIN — DOBUTAMINE HYDROCHLORIDE 8 MCG/KG/MIN: 100 INJECTION INTRAVENOUS at 10:48

## 2023-09-20 RX ADMIN — DOBUTAMINE HYDROCHLORIDE 8 MCG/KG/MIN: 100 INJECTION INTRAVENOUS at 06:20

## 2023-09-20 RX ADMIN — SPIRONOLACTONE 25 MG: 25 TABLET ORAL at 08:19

## 2023-09-20 RX ADMIN — ASPIRIN 81 MG: 81 TABLET, CHEWABLE ORAL at 08:19

## 2023-09-20 RX ADMIN — SACUBITRIL AND VALSARTAN 1 TABLET: 49; 51 TABLET, FILM COATED ORAL at 08:19

## 2023-09-20 RX ADMIN — DOBUTAMINE HYDROCHLORIDE 8 MCG/KG/MIN: 100 INJECTION INTRAVENOUS at 02:11

## 2023-09-20 RX ADMIN — APIXABAN 5 MG: 5 TABLET, FILM COATED ORAL at 21:45

## 2023-09-20 RX ADMIN — TECHNETIUM TC 99M SESTAMIBI 1 DOSE: 1 INJECTION INTRAVENOUS at 12:50

## 2023-09-20 RX ADMIN — Medication 10 ML: at 21:46

## 2023-09-20 RX ADMIN — DOBUTAMINE HYDROCHLORIDE 8 MCG/KG/MIN: 100 INJECTION INTRAVENOUS at 15:14

## 2023-09-20 RX ADMIN — DOBUTAMINE HYDROCHLORIDE 8 MCG/KG/MIN: 100 INJECTION INTRAVENOUS at 19:43

## 2023-09-20 RX ADMIN — ALBUMIN (HUMAN) 25 G: 0.25 INJECTION, SOLUTION INTRAVENOUS at 08:19

## 2023-09-20 RX ADMIN — CARVEDILOL 25 MG: 25 TABLET, FILM COATED ORAL at 17:14

## 2023-09-20 RX ADMIN — AMIODARONE HYDROCHLORIDE 200 MG: 200 TABLET ORAL at 08:19

## 2023-09-20 RX ADMIN — ALBUMIN (HUMAN) 25 G: 0.25 INJECTION, SOLUTION INTRAVENOUS at 16:19

## 2023-09-20 RX ADMIN — CARVEDILOL 25 MG: 25 TABLET, FILM COATED ORAL at 08:19

## 2023-09-20 RX ADMIN — Medication 10 ML: at 08:20

## 2023-09-20 NOTE — PROGRESS NOTES
Risk-benefit treatment options were discussed with the patient, all question answered to patient's and family's satisfaction and an informed consent was obtained for the procedure.    Myocardial perfusion scintigraphy (MPS)  was recommended to the patient as the best non-invasive modality for the assessment of obstructive CAD.  I  did spend some time discussing the procedure, as well as risks and benefits.  I also informed the patient that the risk of nonfatal MI or major cardiac complication is about  0.02%. The patient was also informed about the risks and benefits of MPS. Patient was also provided with a handout describing the test, as well as patient instructions prior to testing.      I also discussed the results of MPS as divided into risks.The NPV of this test is as high as 98%.  I also specifically discussed the risk of cardiac death with the following percentages:    Low-risk MPS:                          0.5% per year  Mildly abnormal MPS:              2.7%  Moderately abnormal:             2.9%  Severely abnormal:                 4.2%

## 2023-09-20 NOTE — PROGRESS NOTES
Patient will be continued on IV dobutamine infusion and albumin with Lasix.  Patient last coronary angiogram was done in 2011 which did not reveal of any evidence of any obstructive epicardial coronary artery disease.  Patient would undergo a myocardial perfusion Lexiscan Cardiolite stress test.    Patient would benefit from nephrology evaluation.  Patient has remained in atrial fibrillation.

## 2023-09-20 NOTE — CONSULTS
Adult Nutrition  Assessment/PES    Patient Name:  Collin Teresa  YOB: 1965  MRN: [id number]  Admit Date:  9/18/2023    Assessment Date:  9/20/2023    Comments: The patient is on a a heart healthy, regular texture, thin liquids diet with a 1500 ml fluid restriction. Ht: 71 inches wt: 308 BMI:42.96 His average meal intake has been 30-40% since admission. He admitted with a diagnosis of atrial fibrillation, CHF, Diabetes mellitus type 2, hypertension, and dilated cardiomyopathy Current labs: 9/20/23 bun 23, creatinine 1.90, GFR 40.4, 9/18/23 Hgba1c 6.4, HDL 28 meds: lasix, spironolactone Estimated need for calories: 15-20 kcals/kg= 4374-1571 kcals, fluid: 1500 ml fluid restriction protein: 0.8-1.0 grams/kg= 111-139.7 grams. Provided patient with low sodium diet education.      Reason for Assessment CHF low sodium diet education      Row Name 09/20/23 1448          Reason for Assessment    Reason For Assessment diagnosis/disease state     Diagnosis cardiac disease     Identified At Risk by Screening Criteria need for education                    Nutrition/Diet History       Row Name 09/20/23 1448          Nutrition/Diet History    Typical Intake (Food/Fluid/EN/PN) 30-40% since admit     Typical Activity Patterns sedentary     Factors Affecting Nutritional Intake restricted diet                    Labs/Tests/Procedures/Meds       Row Name 09/20/23 1449          Labs/Procedures/Meds    Lab Results Reviewed reviewed     Lab Results Comments 9/18/23 Bun 23, creatinine 1.90, GFR 40.3, 9/18/23 hgba1c 6.4        Diagnostic Tests/Procedures    Diagnostic Test/Procedure Reviewed reviewed        Medications    Pertinent Medications Reviewed reviewed     Pertinent Medications Comments lasix, sprironolactone                    Physical Findings       Row Name 09/20/23 1452          Physical Findings    Overall Physical Appearance Obese as evidenced by BMI of 42.96                    Estimated/Assessed Needs -  "Anthropometrics       Row Name 09/20/23 1452          Anthropometrics    Height 180.3 cm (71\")     Weight for Calculation 140 kg (308 lb)        Estimated/Assessed Needs    Additional Documentation KCAL/KG (Group);Fluid Requirements (Group);Protein Requirements (Group)        KCAL/KG    KCAL/KG 15 Kcal/Kg (kcal);20 Kcal/Kg (kcal)     15 Kcal/Kg (kcal) 2095.62     20 Kcal/Kg (kcal) 2794.16        Protein Requirements    Weight Used For Protein Calculations 140 kg (308 lb)     Est Protein Requirement Amount (gms/kg) 1.0 gm protein  0.8-1.0 grams/kg= 111-139.7 grams protein     Estimated Protein Requirements (gms/day) 139.71        Fluid Requirements    Fluid Requirements (mL/day) 1500     Estimated Fluid Requirement Method other (see comments)  1500 ml fluid restriction     RDA Method (mL) 1500                    Nutrition Prescription Ordered       Row Name 09/20/23 1456          Nutrition Prescription PO    Current PO Diet Regular     Fluid Consistency Thin     Common Modifiers Cardiac;Fluid Restriction     Fluid Restriction mL per Day 1500 mL                    Evaluation of Received Nutrient/Fluid Intake       Row Name 09/20/23 1457          PO Evaluation    Number of Days PO Intake Evaluated 1 day     Number of Meals 2     % PO Intake 37.5  35-45% of meals.                       Problem/Interventions:   Problem 1       Row Name 09/20/23 1459          Nutrition Diagnoses Problem 1    Problem 1 Knowledge Deficit     Etiology (related to) MNT for Treatment/Condition     Signs/Symptoms (evidenced by) Potential Information Deficit                          Intervention Goal       Row Name 09/20/23 1500          Intervention Goal    General Provide information regarding MNT for treatment/condition     PO Maintain intake     Weight Maintain weight                    Nutrition Intervention       Row Name 09/20/23 1500          Nutrition Intervention    RD/Tech Action Other (comment)  provided nutrition education for a low " sodium diet                    Nutrition Prescription       Row Name 09/20/23 1501          Nutrition Prescription PO    PO Prescription Other (comment)  Continue current diet     Fluid Consistency Thin     Common Modifiers Cardiac                    Education/Evaluation       Row Name 09/20/23 1502          Education    Education Provided education regarding  low sodium diet     Provided education regarding Other (comment)  low sodium diet education for CHF        Monitor/Evaluation    Monitor PO intake;Pertinent labs;Weight     Education Follow-up Reinforce PRN                     Electronically signed by:  Bj Oro RD  09/20/23 15:03 CDT

## 2023-09-20 NOTE — PLAN OF CARE
Goal Outcome Evaluation:  Plan of Care Reviewed With: patient           Outcome Evaluation: ProBNP 1097. Stress test started. NPO at midnight. Pt reports no pain at this time. Will continue to monitor.

## 2023-09-20 NOTE — CONSULTS
Cardiology Consultation Note.        Patient Name: Collin Teresa  Age/Sex: 58 y.o. male  : 1965  MRN: [id number]    Date of consultation: 2023  Consulting Physician: Juma Ricardo MD  Primary care Physician: Zakiya George DO  Requesting Physician:   Gita Joy APRN   Reason for consultation: Shortness of breath bilateral lower extremity edema      Subjective:       Chief Complaint: Shortness of breath    History of Present Illness:  Collin Teresa is a 58 y.o. male     Body mass index is 43.4 kg/m².  With a past medical history significant for coronary angiogram done in  which did not reveal of any evidence of any obstructive epicardial coronary artery disease, nonischemic cardiomyopathy with an ejection fraction of 20 to 25%, moderate to severe mitral regurgitation, arterial hypertension, hypertensive heart disease, obesity with a body mass index of 40, chronic kidney disease.    Patient has had previous evaluation at the [facility]F clinic and was on Aldactone digoxin ACE inhibitor and a beta-blocker and subsequently patient ACE inhibitor was stopped secondary to the patient renal insufficiency and patient was started on hydralazine and isosorbide.  Patient was on Corlanor 5 mg twice a day for the tachyarrhythmia.    Patient was evaluated by the primary care physician for upper respiratory tract infection.  Patient had elevated heart rate and was found to be in atrial fibrillation and was recommended cardiology evaluation.    Patient clearly has new onset of atrial fibrillation.  Patient was on Eliquis and amiodarone.  Patient had worsening shortness of breath and was reevaluated by the primary care.    Patient on subsequent evaluation underwent further blood test evaluation which had revealed worsening renal function of her creatinine of 1.90 and elevated troponin of 38.    Due to the patient elevated troponin with left ventricular systolic dysfunction with atrial fibrillation patient  was subsequently recommended to present to the emergency room.    Patient on questioning denies any symptoms of chest pain.    Patient denies any lightheaded dizziness near syncope.    Patient has been working 2 jobs and has had decreased exercise tolerance with worsening shortness of breath.  Patient denies excessive intake of salt.    Patient 10 point review of system except for what stated in the history of present illness and negative.      Concurrent Medical History:  1.  Shortness of breath with bilateral lower extremity edema.  2.  Elevated troponin.  3.  Atrial fibrillation with a controlled ventricular rate.  4.  Coronary angiogram done in 2011 which did not reveal of any evidence of any obstructive epicardial coronary artery disease.  5.  Nonischemic cardiomyopathy with left ventricular systolic dysfunction with an ejection fraction of 20 to 25%.  6.  Arterial hypertension.  7.  Hypertensive heart disease.  8.  Nonrheumatic trace mitral regurgitation and mild tricuspid regurgitation.  9.  Obesity with a body mass index of 40.  10.  Non-insulin-dependent diabetes.  11.  Chronic kidney disease.      Past Surgical History:  1.  Coronary angiogram done in 2011.  2.  EGD      Family History: Family history significant for coronary artery disease in father and arterial hypertension and diabetes.      Social History: No history of tobacco alcohol intake Works in the coal mines         Cardiac Risk Factors:  1.  Male.  2.  Obesity with a body mass index of 40.  3.  Arterial hypertension.  4.  Diabetes.      Allergies:  Allergies   Allergen Reactions    Dexamethasone Other (See Comments)       Medication:  Medications Prior to Admission   Medication Sig Dispense Refill Last Dose    amiodarone (PACERONE) 200 MG tablet Take 1 tablet by mouth Every 12 (Twelve) Hours.       carvedilol (COREG) 25 MG tablet TAKE ONE TABLET BY MOUTH TWO TIMES A DAY WITH MEALS 180 tablet 0 9/18/2023    Corlanor 5 MG tablet tablet TAKE ONE  TABLET BY MOUTH TWO TIMES A DAY WITH MEALS 60 tablet 11 9/18/2023    Eliquis 5 MG tablet tablet Take 1 tablet by mouth Every 12 (Twelve) Hours.       furosemide (LASIX) 40 MG tablet Take 1 tablet by mouth Daily As Needed (weight gain of 3lbs overnight or 5lbs in a week.). 90 tablet 3 9/18/2023    hydrALAZINE (APRESOLINE) 25 MG tablet Take 1 tablet by mouth 3 (Three) Times a Day. 270 tablet 2 9/18/2023    isosorbide mononitrate (IMDUR) 30 MG 24 hr tablet TAKE 1 TABLET BY MOUTH DAILY. 90 tablet 3 9/18/2023    spironolactone (ALDACTONE) 25 MG tablet Take 1 tablet by mouth Daily. 90 tablet 0 9/18/2023    albuterol sulfate  (90 Base) MCG/ACT inhaler Inhale 2 puffs Every 4 (Four) Hours As Needed for Wheezing or Shortness of Air. 6.7 g 11 Unknown    aspirin 81 MG chewable tablet Chew 1 tablet Daily.       brompheniramine-pseudoephedrine-DM 30-2-10 MG/5ML syrup Take 2.5 mL by mouth 4 (Four) Times a Day As Needed for Congestion or Allergies. 473 mL 3 Unknown    magnesium oxide (MAG-OX) 400 MG tablet Take 1 tablet by mouth Daily.              Review of Systems:       Constitutional:  Denies recent weight loss, weight gain, fever or chills, no change in exercise tolerance.     HENT:  Denies any hearing loss, epistaxis, hoarseness, or difficulty speaking.     Eyes: Wears eyeglasses or contact lenses     Respiratory:  Denies dyspnea with exertion,no cough, wheezing, or hemoptysis.     Cardiovascular: Positive for shortness of breath with bilateral lower extremity edema.  Negative for palpitations, chest pain, orthopnea, PND, , syncope, or claudication.     Gastrointestinal:  Denies change in bowel habits, dyspepsia, ulcer disease, hematochezia, or melena.  No nausea, no vomiting, no hematemesis, no diarrhea or constipation.    Endocrine: Negative for cold intolerance, heat intolerance, polydipsia, polyphagia or polyuria. Denies any history of weight change or unintended weight loss.    Genitourinary: Negative for  hematuria.  No frequent urination or nocturia.      Musculoskeletal: Denies any history of arthritic symptoms or back problems .  No joint pain, joint stiffness, joint swelling, muscle pain, muscle weakness or neck pain.    Skin:  Denies any change in hair or nails, rashes, or skin lesions.     Allergic/Immunologic: Negative.  Negative for environmental allergies, food allergies or immunocompromised state.     Neurological:  Denies any history of recurrent headaches, strokes, TIA, or seizure disorder.     Hematological: Denies excessive bleeding, easy bruising, fatigue, lymphadenopathy or petechiae or any bleeding disorders.     Psychiatric/Behavioral: Denies any history of depression, substance abuse, or change in cognitive function. Denies any psychomotor reaction or tangential thought.  No depression, homicidal ideations or suicidal ideations.          Objective:     Objective:  Temp:  [98 °F (36.7 °C)-98.1 °F (36.7 °C)] 98.1 °F (36.7 °C)  Heart Rate:  [] 53  Resp:  [18-20] 18  BP: (108-159)/() 120/67      Body mass index is 43.4 kg/m².           Physical Exam:   General Appearance:    Alert, oriented, cooperative, in no acute distress.   Head:    Normocephalic, atraumatic, without obvious abnormality.   Eyes:           MIRI.  Lids and lashes normal, conjunctivae and sclerae normal, no icterus, no pallor.   Ears:    Ears appear intact with no abnormalities noted.   Throat:   Mucous membranes pink and moist.   Neck:   Supple, trachea midline, no carotid bruit, no organomegaly or JVD.   Lungs:     Clear to auscultation and percussion, respirations regular, even and unlabored. No wheezes, rales or rhonchi.    Heart:    Regular rhythm and normal rate, normal S1 and S2, no murmur, no gallop, no rub, no click.   Abdomen:     Soft, nontender, nondistended, no guarding, no rebound tenderness, normal bowel sounds in all four quadrants, no masses, liver and spleen nonpalpable.   Genitalia:    Deferred.    Extremities:   Moves all extremities well, no edema, no cyanosis, no  redness, no clubbing.   Pulses:   Pulses palpable and equal bilaterally.   Skin:   Moist and warm. No bleeding, bruising or rash.   Neurologic/Psychiatric:   Alert and oriented to person, place, and time.  Motor, power and tone in upper and lower extremities are grossly intact. No focal neurological deficits. Normal cognitive function. No psychomotor reaction or tangential thought. No depression, homicidal ideations and suicidal ideations.       Medication Review:   Current Facility-Administered Medications   Medication Dose Route Frequency Provider Last Rate Last Admin    acetaminophen (TYLENOL) tablet 650 mg  650 mg Oral Q4H PRN Dickson Nichols MD        Or    acetaminophen (TYLENOL) 160 MG/5ML oral solution 650 mg  650 mg Oral Q4H PRN Dickson Nichols MD        Or    acetaminophen (TYLENOL) suppository 650 mg  650 mg Rectal Q4H PRN Dickson Nichols MD        albuterol (PROVENTIL) nebulizer solution 0.083% 2.5 mg/3mL  2.5 mg Nebulization Q4H PRN Dickson Nichols MD        amiodarone (PACERONE) tablet 200 mg  200 mg Oral Q12H Dickson Nichols MD   200 mg at 09/19/23 0819    apixaban (ELIQUIS) tablet 5 mg  5 mg Oral Q12H Dickson Nichols MD   5 mg at 09/19/23 0820    aspirin chewable tablet 81 mg  81 mg Oral Daily Dickson Nichols MD   81 mg at 09/19/23 0820    carvedilol (COREG) tablet 25 mg  25 mg Oral BID With Meals Dickson Nichols MD   25 mg at 09/19/23 0820    furosemide (LASIX) injection 40 mg  40 mg Intravenous BID Dickson Nichols MD   40 mg at 09/19/23 1413    hydrALAZINE (APRESOLINE) tablet 25 mg  25 mg Oral TID Dickson Nichols MD   25 mg at 09/19/23 1545    isosorbide mononitrate (IMDUR) 24 hr tablet 30 mg  30 mg Oral Daily Dickson Nichols MD   30 mg at 09/19/23 0819    LORazepam (ATIVAN) tablet 0.5 mg  0.5 mg Oral Q8H PRN Dickson Nichols MD        melatonin  tablet 5 mg  5 mg Oral Nightly PRN Dickson Nichols MD        morphine injection 2 mg  2 mg Intravenous Q4H PRN Dickson Nichols MD        And    naloxone (NARCAN) injection 0.4 mg  0.4 mg Intravenous Q5 Min PRN Dickson Nichols MD        nitroglycerin (NITROSTAT) SL tablet 0.4 mg  0.4 mg Sublingual Q5 Min PRN Dickson Nichols MD        ondansetron (ZOFRAN) tablet 4 mg  4 mg Oral Q6H PRN Dickson Nichols MD        Or    ondansetron (ZOFRAN) injection 4 mg  4 mg Intravenous Q6H PRN Dickson iNchols MD        sacubitril-valsartan (ENTRESTO) 49-51 MG tablet 1 tablet  1 tablet Oral Q12H Dickson Nichols MD   1 tablet at 09/19/23 0819    sodium chloride 0.9 % flush 10 mL  10 mL Intravenous Q12H Dickson Nichols MD   10 mL at 09/19/23 0820    sodium chloride 0.9 % flush 10 mL  10 mL Intravenous PRN Dickson Nichols MD        sodium chloride 0.9 % infusion 40 mL  40 mL Intravenous PRN Dickson Nichols MD        spironolactone (ALDACTONE) tablet 25 mg  25 mg Oral Daily Dickson Nichols MD   25 mg at 09/19/23 0819       Lab Review:     Results from last 7 days   Lab Units 09/19/23  0521 09/18/23  1520   SODIUM mmol/L 140 141   POTASSIUM mmol/L 4.0 4.6   CHLORIDE mmol/L 103 104   CO2 mmol/L 24.0 26.0   BUN mg/dL 29* 33*   CREATININE mg/dL 1.90* 2.22*   CALCIUM mg/dL 9.4 9.6   BILIRUBIN mg/dL  --  1.1   ALK PHOS U/L  --  68   ALT (SGPT) U/L  --  22   AST (SGOT) U/L  --  21   GLUCOSE mg/dL 89 105*     Results from last 7 days   Lab Units 09/19/23  0925 09/19/23  0521 09/18/23  1924   HSTROP T ng/L 35* 35* 38*         Results from last 7 days   Lab Units 09/19/23  0521   WBC 10*3/mm3 7.35   HEMOGLOBIN g/dL 14.7   HEMATOCRIT % 45.9   PLATELETS 10*3/mm3 192         Results from last 7 days   Lab Units 09/18/23  1924   MAGNESIUM mg/dL 2.3                   EKG:   ECG/EMG Results (last 24 hours)       Procedure Component Value Units Date/Time    ECG 12 Lead  [[id number]] Resulted: 09/19/23 0555     Updated: 09/19/23 0555    ECG 12 Lead [[id number]] Resulted: 09/19/23 0555     Updated: 09/19/23 0555    ECG 12 Lead Rhythm Change [[id number]] Collected: 09/18/23 1925     Updated: 09/19/23 0747     QT Interval 444 ms      QTC Interval 512 ms     Narrative:      Test Reason : Rhythm Change  Blood Pressure :   */*   mmHG  Vent. Rate :  80 BPM     Atrial Rate : 101 BPM     P-R Int :   * ms          QRS Dur : 130 ms      QT Int : 444 ms       P-R-T Axes :   * -73 110 degrees     QTc Int : 512 ms    Sinus tachycardia with AV dissociation and Wide QRS rhythm  Left axis deviation  Nonspecific intraventricular block  Inferior infarct , age undetermined  Anterolateral infarct , age undetermined  Abnormal ECG  When compared with ECG of 31-AUG-2021 09:47,  Wide QRS rhythm has replaced Sinus rhythm    Referred By:            Confirmed By:     Adult Transthoracic Echo Complete With Contrast if Necessary Per Protocol [[id number]] Resulted: 09/19/23 1411     Updated: 09/19/23 1411     EF(MOD-bp) 26.7 %      LVIDd 6.6 cm      LVIDs 5.8 cm      IVSd 1.29 cm      LVPWd 1.20 cm      FS 12.7 %      IVS/LVPW 1.07 cm      ESV(cubed) 190.4 ml      LV Sys Vol (BSA corrected) 56.5 cm2      EDV(cubed) 286.1 ml      LV Wilde Vol (BSA corrected) 76.4 cm2      LVOT area 4.2 cm2      LV mass(C)d 385.8 grams      LVOT diam 2.31 cm      EDV(MOD-sp2) 263.0 ml      EDV(MOD-sp4) 192.0 ml      ESV(MOD-sp2) 200.0 ml      ESV(MOD-sp4) 142.0 ml      SV(MOD-sp2) 63.0 ml      SV(MOD-sp4) 50.0 ml      SI(MOD-sp2) 25.1 ml/m2      SI(MOD-sp4) 19.9 ml/m2      EF(MOD-sp2) 24.0 %      EF(MOD-sp4) 26.0 %      MV E max juan 70.3 cm/sec      Med Peak E' Juan 4.6 cm/sec      Lat Peak E' Juan 9.0 cm/sec      Avg E/e' ratio 10.34     SV(LVOT) 62.1 ml      RVIDd 4.6 cm      LA dimension (2D)  5.9 cm      LV V1 max 75.8 cm/sec      LV V1 max PG 2.30 mmHg      LV V1 mean PG 1.00 mmHg      LV V1 VTI 14.8 cm      Ao pk juan 117.0 cm/sec       Ao max PG 5.5 mmHg      Ao mean PG 3.0 mmHg      Ao V2 VTI 23.1 cm      ASHLEE(I,D) 2.7 cm2      MV max PG 3.3 mmHg      MV mean PG 1.08 mmHg      MV V2 VTI 18.6 cm      MV P1/2t 89.5 msec      MVA(P1/2t) 2.46 cm2      MVA(VTI) 3.3 cm2      MV dec slope 293.0 cm/sec2      MR max jarred 332.6 cm/sec      MR max PG 44.2 mmHg      TR max jarred 289.1 cm/sec      TR max PG 33.4 mmHg      RVSP(TR) 43.4 mmHg      RAP systole 10.0 mmHg      PA V2 max 84.2 cm/sec      Ao root diam 3.9 cm      ACS 2.49 cm      STJ 3.3 cm     Narrative:        Estimated right ventricular systolic pressure from tricuspid   regurgitation is mildly elevated (35-45 mmHg).      Impression:                  ECHO:  Results for orders placed during the hospital encounter of 04/13/20    Adult Transthoracic Echo Complete W/ Cont if Necessary Per Protocol    Interpretation Summary  · Study is technically adequate. Patient refused Definity contrast to evaluate for LV thrombus.  · Left ventricle systolic function is severely decreased at 21-25%. Left ventricular cavity is severely dilated. Restrictive diastolic function.  · Right ventricular systolic function is moderately reduced. Right ventricular cavity is moderately dilated.  · Dilated mitral annulus with moderate to severe mitral regurgitation.  · Dilated tricuspid annulus with at least moderate tricuspid regurgitation. PA systolic pressure is 52 mmHg and consistent with pulmonary hypertension.  · Dilated pulmonic annulus with moderate pulmonary regurgitation.  · Tubular ascending aorta demonstrates ectasia to 3.9 cm. The patient's ASI is 1.4 cm/meter squared.  · Since prior study, there has been interval dilatation of the left ventricle with a reduction in LVEF and worsening valvular regurgitation.       Imaging:  Imaging Results (Last 24 Hours)       Procedure Component Value Units Date/Time    XR Chest 1 View [[id number]] Collected: 09/18/23 2220     Updated: 09/18/23 2223    Narrative:      XR CHEST 1  VIEW    HISTORY: elevated BNP    COMPARISON: None.    FINDINGS:  Frontal view of the chest was obtained.    There is mild vascular congestion.. The cardiac silhouette is enlarged. There is  no significant pleural effusion or pneumothorax.    The osseous structures and surrounding soft tissues demonstrate no acute  abnormality.    Upper abdomen is unremarkable.        Impression:      1. Mild vascular congestion without focal infiltrate.                I personally viewed and interpreted the patient's EKG/Telemetry data.    Assessment:   1.  Shortness of breath with bilateral lower extremity edema.  2.  Elevated troponin.  3.  Previous coronary angiogram done in 2011 which did not reveal of any evidence of any obstructive epicardial coronary artery disease.  4.  Nonischemic cardiomyopathy with an ejection fraction of 20 to 25%.  5.  Atrial fibrillation.  6.  Arterial hypertension.  7.  Hypertensive heart disease.  8.  Diabetes.  9.  Chronic kidney disease.  10.  Obesity with a body mass index of 40          Plan:   1.  Shortness of breath.  Patient clearly has worsening shortness of breath with bilateral lower extremity edema.  Patient symptom complex had not improved even after the administration of extra Lasix.  Patient was evaluated by Dr. Zakiya George and was subsequently recommended evaluation.  Patient after the initial evaluation due to the patient's symptom complex was sent to the lab which had revealed elevated troponin with worsening bilateral lower extremity edema not responding to outpatient diuretic therapy.  Due to the patient elevated troponin with left ventricular systolic dysfunction with atrial fibrillation patient was subsequently recommended hospitalization.  Patient was recommended intravenous dobutamine infusion.  Patient would undergo serial troponin.  Patient was started on IV Lasix along with albumin infusion.    2.  Positive troponin.  Patient has not complained of having symptoms of severe  substernal chest pain.  Patient elevated troponin could be secondary to demand ischemia with atrial fibrillation.  Patient had undergone previous coronary angiogram in 2011 which did not reveal of any evidence of any obstructive epicardial coronary artery disease.  Patient at the present time has been recommended serial troponin.  Have discussed with the patient possible need for further ischemic evaluation.  Due to the patient's symptoms of shortness of breath patient would not be considered for invasive evaluation with a chronic kidney disease.  Have discussed with the patient noninvasive evaluation with a myocardial perfusion Lexiscan Cardiolite stress test after the patient's symptoms of shortness of breath improves.    3.  Atrial fibrillation.  Patient on previous evaluation in March 2022 was in sinus rhythm with repeat evaluation in November 2022 was also in normal sinus rhythm.  Patient atrial fibrillation was noted in August 2023 and patient has been on anticoagulation with Eliquis along with amiodarone.  Patient was on Corlanor 5 mg twice a day which would be held.  Patient was recommended consideration for electrical cardioversion after continuing anticoagulation for 8 to 12 weeks and after repeating echocardiogram.  Patient would be continued on amiodarone and Eliquis.      4.  Arterial hypertension.  Patient blood pressure has been labile and elevated.  Patient would be continued on the afterload reduction with hydralazine along with the carvedilol.  Patient has been counseled to decrease her salt intake.    5.  Nonischemic cardiomyopathy with severe left ventricular systolic dysfunction with an ejection fraction of 20 to 25%.  Patient will be initiated on IV dobutamine with aggressive diuresis with IV Lasix and would follow the renal function.    6.  Anticoagulation with Eliquis.  Patient has not had any hemoptysis hematuria Beiter blood per rectum.    7.  Obesity with a body mass index of 40.  Patient  has been counseled on weight reduction lifestyle modification and dietary restriction.    8.  Renal insufficiency.  Patient renal function would be followed on diuretics.    9.  Non-insulin-dependent diabetes.  Patient was evaluated by the hospitalist and was on Invokana.  Patient has been counseled on [employer] diet.    Thank you for the consultation.    The above plan of management were discussed with the patient          Time: Time spent in face-to-face evaluation of greater than 55 minutes interacting, formulating, examining and discussing the plan with the patient with 50% of greater time spent in face-to-face interaction.    Electronically signed by Juma Ricardo MD, 09/19/23, 7:05 PM CDT.    Dictated utilizing Dragon dictation.

## 2023-09-20 NOTE — PAYOR COMM NOTE
"Sophia Yap  [facility]  Case Management Extender  [phone] phone  [phone] fax      Attn: Collin Quintero (58 y.o. Male)       Date of Birth   1965    Social Security Number       Address   [address] [place] [address]  [place] [address]    Home Phone   [phone]    MRN   [id number]       Rastafari   [facility]    Marital Status                               Admission Date   9/18/23    Admission Type   Emergency    Admitting Provider   Dickson Nichols MD    Attending Provider   Dickson Nichols MD    Department, Room/Bed   [address], 320/1       Discharge Date       Discharge Disposition       Discharge Destination                                 Attending Provider: Dickson Nichols MD    Allergies: Dexamethasone    Isolation: None   Infection: None   Code Status: CPR    Ht: 180.3 cm (71\")   Wt: 140 kg (308 lb)    Admission Cmt: None   Principal Problem: CHF exacerbation [I50.9]                   Active Insurance as of 9/18/2023       Primary Coverage       Payor Plan Insurance Group Employer/Plan Group    [employer]ANCE [employer]URANCE [id number]       Payor Plan Address Payor Plan Phone Number Payor Plan Fax Number Effective Dates    [address] [phone]  5/18/2021 - None Entered    [place] [id number]         Subscriber Name Subscriber Birth Date Member ID       COLLIN TERESA 1965 [id number]                     Emergency Contacts        (Rel.) Home Phone Work Phone Mobile Phone    Josie Teresa (Spouse) [phone] -- [phone]                 History & Physical        Dickson Nichols MD at 09/18/23 [facility]5                [facility] Medicine Admission      Date of Admission: 9/18/2023      Primary Care Physician: Zakiya George DO    Chief Complaint: Dyspnoea     HPI: This pleasant " gentleman has experienced 2 weeks of worsening dyspnea.  He felt that approximately a week ago, with the use of his diuretics, he was getting better, however, he has proceeded to deteriorate again clinically.  Patient has developed worsening of his peripheral edema as well but denies any other cardiorespiratory symptoms on review.      Concurrent Medical History:  has a past medical history of Abdominal pain, Cardiomegaly, Chest pain, Essential hypertension, History of EKG (09/19/2016), History of toxic effects, Morbid obesity, and Peripheral edema.    Past Surgical History:  has a past surgical history that includes Cardiac catheterization and Esophagogastroduodenoscopy.    Family History: family history includes Diabetes in an other family member; Hypertension in an other family member.     Social History:  reports that he has never smoked. He has never used smokeless tobacco. He reports that he does not drink alcohol and does not use drugs.    Allergies:   Allergies   Allergen Reactions    Dexamethasone Other (See Comments)       Medications:   Prior to Admission medications    Medication Sig Start Date End Date Taking? Authorizing Provider   amiodarone (PACERONE) 200 MG tablet Take 1 tablet by mouth Every 12 (Twelve) Hours. 8/15/23  Yes ProviderYaneth MD   carvedilol (COREG) 25 MG tablet TAKE ONE TABLET BY MOUTH TWO TIMES A DAY WITH MEALS 6/20/23  Yes Norma Olivarez APRN   Corlanor 5 MG tablet tablet TAKE ONE TABLET BY MOUTH TWO TIMES A DAY WITH MEALS 3/23/22  Yes Noemi Key APRN   Eliquis 5 MG tablet tablet Take 1 tablet by mouth Every 12 (Twelve) Hours. 8/15/23  Yes Yaneth Mahmood MD   furosemide (LASIX) 40 MG tablet Take 1 tablet by mouth Daily As Needed (weight gain of 3lbs overnight or 5lbs in a week.). 1/19/22  Yes Noemi Key APRN   hydrALAZINE (APRESOLINE) 25 MG tablet Take 1 tablet by mouth 3 (Three) Times a Day. 8/31/21  Yes Noemi Key APRN   isosorbide  "mononitrate (IMDUR) 30 MG 24 hr tablet TAKE 1 TABLET BY MOUTH DAILY. 10/4/22  Yes Norma Olivarez APRN   spironolactone (ALDACTONE) 25 MG tablet Take 1 tablet by mouth Daily. 8/23/22  Yes Norma Olivarez APRN   albuterol sulfate  (90 Base) MCG/ACT inhaler Inhale 2 puffs Every 4 (Four) Hours As Needed for Wheezing or Shortness of Air. 1/20/22   Noemi Key APRN   aspirin 81 MG chewable tablet Chew 1 tablet Daily.    ProviderYaneth MD   brompheniramine-pseudoephedrine-DM 30-2-10 MG/5ML syrup Take 2.5 mL by mouth 4 (Four) Times a Day As Needed for Congestion or Allergies. 1/20/22   Noemi Key APRN   magnesium oxide (MAG-OX) 400 MG tablet Take 1 tablet by mouth Daily.    ProviderYaneth MD   promethazine (PHENERGAN) 12.5 MG tablet Take 1 tablet by mouth Every 6 (Six) Hours As Needed for Nausea or Vomiting. 8/31/21 9/18/23  Noemi Key APRN   promethazine (PHENERGAN) 25 MG tablet TAKE ONE TABLET BY MOUTH EVERY SIX HOURS AS NEEDED FOR NAUSEA 10/27/21 9/18/23  Provider, MD Yaneth       Review of Systems:  Review of Systems   Constitutional:  Negative for chills, diaphoresis and fever.   HENT:  Negative for congestion, ear pain, rhinorrhea, sinus pressure, sneezing and sore throat.         \"Allergies\"   Respiratory:  Positive for shortness of breath. Negative for cough, wheezing and stridor.         Chronic cough and phlegm   Cardiovascular:  Positive for leg swelling. Negative for chest pain and palpitations.   Gastrointestinal:  Negative for abdominal distention, abdominal pain, constipation, diarrhea, nausea and vomiting.   Genitourinary:  Negative for difficulty urinating, dysuria, enuresis, frequency, hematuria and urgency.   Musculoskeletal:  Negative for arthralgias, joint swelling and myalgias.   Skin:  Negative for color change, rash and wound.   Neurological:  Negative for dizziness, syncope, light-headedness and headaches.   Psychiatric/Behavioral:  Negative " for agitation, confusion and hallucinations.     Otherwise complete ROS is negative except as mentioned above.    Physical Exam:   Temp:  [98 °F (36.7 °C)] 98 °F (36.7 °C)  Heart Rate:  [] 64  Resp:  [16-20] 20  BP: (113-159)/() 132/64  Physical Exam  Vitals and nursing note reviewed.   Constitutional:       General: He is awake. He is not in acute distress.     Appearance: He is morbidly obese. He is not toxic-appearing or diaphoretic.   HENT:      Head: Normocephalic and atraumatic.      Nose: Nose normal. No congestion or rhinorrhea.      Mouth/Throat:      Pharynx: Oropharynx is clear. No oropharyngeal exudate or posterior oropharyngeal erythema.   Eyes:      General:         Right eye: No discharge.         Left eye: No discharge.      Conjunctiva/sclera: Conjunctivae normal.      Pupils: Pupils are equal, round, and reactive to light.   Cardiovascular:      Rate and Rhythm: Normal rate and regular rhythm.      Heart sounds: Normal heart sounds, S1 normal and S2 normal. Heart sounds not distant.     No friction rub. No gallop.   Pulmonary:      Effort: Pulmonary effort is normal. No respiratory distress.      Breath sounds: Normal breath sounds. No stridor. No rhonchi or rales.   Abdominal:      General: There is no distension.      Palpations: Abdomen is soft. There is no mass.      Hernia: No hernia is present.   Musculoskeletal:         General: No tenderness or signs of injury.      Right lower leg: 3+ Pitting Edema present.      Left lower leg: 3+ Pitting Edema present.   Skin:     Capillary Refill: Capillary refill takes 2 to 3 seconds.      Coloration: Skin is not jaundiced.      Findings: No bruising, erythema or rash.   Neurological:      General: No focal deficit present.      Mental Status: He is alert and oriented to person, place, and time. Mental status is at baseline.      Cranial Nerves: No cranial nerve deficit.   Psychiatric:         Behavior: Behavior normal. Behavior is  cooperative.         Thought Content: Thought content normal.         Judgment: Judgment normal.         Results Reviewed:  I have personally reviewed current lab, radiology, and data and agree with results.  Lab Results (last 24 hours)       Procedure Component Value Units Date/Time    Basic Metabolic Panel [[id number]]  (Abnormal) Collected: 09/19/23 0521    Specimen: Blood Updated: 09/19/23 0632     Glucose 89 mg/dL      BUN 29 mg/dL      Creatinine 1.90 mg/dL      Sodium 140 mmol/L      Potassium 4.0 mmol/L      Comment: Slight hemolysis detected by analyzer. Results may be affected.        Chloride 103 mmol/L      CO2 24.0 mmol/L      Calcium 9.4 mg/dL      BUN/Creatinine Ratio 15.3     Anion Gap 13.0 mmol/L      eGFR 40.4 mL/min/1.73     Narrative:      GFR Normal >60  Chronic Kidney Disease <60  Kidney Failure <15      CBC & Differential [[id number]]  (Normal) Collected: 09/19/23 0521    Specimen: Blood Updated: 09/19/23 0615    Narrative:      The following orders were created for panel order CBC & Differential.  Procedure                               Abnormality         Status                     ---------                               -----------         ------                     CBC Auto Differential[[id number]]        Normal              Final result                 Please view results for these tests on the individual orders.    CBC Auto Differential [[id number]]  (Normal) Collected: 09/19/23 0521    Specimen: Blood Updated: 09/19/23 0615     WBC 7.35 10*3/mm3      RBC 4.79 10*6/mm3      Hemoglobin 14.7 g/dL      Hematocrit 45.9 %      MCV 95.8 fL      MCH 30.7 pg      MCHC 32.0 g/dL      RDW 14.0 %      RDW-SD 49.3 fl      MPV 11.1 fL      Platelets 192 10*3/mm3      Neutrophil % 65.3 %      Lymphocyte % 26.3 %      Monocyte % 6.0 %      Eosinophil % 1.4 %      Basophil % 0.7 %      Immature Grans % 0.3 %      Neutrophils, Absolute 4.81 10*3/mm3      Lymphocytes, Absolute 1.93 10*3/mm3      Monocytes,  Absolute 0.44 10*3/mm3      Eosinophils, Absolute 0.10 10*3/mm3      Basophils, Absolute 0.05 10*3/mm3      Immature Grans, Absolute 0.02 10*3/mm3      nRBC 0.0 /100 WBC     Extra Tubes [[id number]] Collected: 09/18/23 1924    Specimen: Blood, Venous Line Updated: 09/18/23 2031    Narrative:      The following orders were created for panel order Extra Tubes.  Procedure                               Abnormality         Status                     ---------                               -----------         ------                     Lavender Top[[id number]]                                     Final result               Gold Top - SST[[id number]]                                   Final result               Light Blue Top[[id number]]                                   Final result                 Please view results for these tests on the individual orders.    Gold Top - SST [[id number]] Collected: 09/18/23 1924    Specimen: Blood Updated: 09/18/23 2031     Extra Tube Hold for add-ons.     Comment: Auto resulted.       Light Blue Top [[id number]] Collected: 09/18/23 1924    Specimen: Blood Updated: 09/18/23 2031     Extra Tube Hold for add-ons.     Comment: Auto resulted       Lavender Top [[id number]] Collected: 09/18/23 1924    Specimen: Blood Updated: 09/18/23 2031     Extra Tube hold for add-on     Comment: Auto resulted       Single High Sensitivity Troponin T [[id number]]  (Abnormal) Collected: 09/18/23 1924    Specimen: Blood Updated: 09/18/23 2001     HS Troponin T 38 ng/L     Narrative:      High Sensitive Troponin T Reference Range:  <10.0 ng/L- Negative Female for AMI  <15.0 ng/L- Negative Male for AMI  >=10 - Abnormal Female indicating possible myocardial injury.  >=15 - Abnormal Male indicating possible myocardial injury.   Clinicians would have to utilize clinical acumen, EKG, Troponin, and serial changes to determine if it is an Acute Myocardial Infarction or myocardial injury due to an underlying chronic  condition.         Magnesium [[id number]]  (Normal) Collected: 09/18/23 1924    Specimen: Blood Updated: 09/18/23 1945     Magnesium 2.3 mg/dL           Imaging Results (Last 24 Hours)       Procedure Component Value Units Date/Time    XR Chest 1 View [[id number]] Collected: 09/18/23 2220     Updated: 09/18/23 2223    Narrative:      XR CHEST 1 VIEW    HISTORY: elevated BNP    COMPARISON: None.    FINDINGS:  Frontal view of the chest was obtained.    There is mild vascular congestion.. The cardiac silhouette is enlarged. There is  no significant pleural effusion or pneumothorax.    The osseous structures and surrounding soft tissues demonstrate no acute  abnormality.    Upper abdomen is unremarkable.        Impression:      1. Mild vascular congestion without focal infiltrate.                  Assessment:    Active Hospital Problems    Diagnosis     AMRIT (acute kidney injury)     Elevated troponin     Pulmonary vascular congestion     Peripheral edema          Medical Decision Making  Number and Complexity of problems: Full - high  Differential Diagnosis: As above    Conditions and Status:        Condition is at treatment goal.     [facility] Data  External documents reviewed: Chart review and care everywhere clinical summaries from [facility]  My EKG interpretation: Sinus arrhythmia with left bundle branch block pattern and QTc prolongation.  There are no acute ST-T wave changes  My plain film interpretation: Chest x-ray reveals pulmonary vascular congestion but no other acute intrathoracic pathology  Tests considered but not ordered: Nil     Decision rules/scores evaluated (example [id number], Wells, etc): NYHA     Discussed with: the patient     Treatment Plan  Pulmonary vascular congestion and peripheral edema  We will start the patient on IV Lasix, will have to be careful with the dosing of this regimen as the patient has an acute kidney injury.  I will also start the patient on Entresto, although it can, we will have  to be careful to monitor the patient's renal function.  Optimization of goal-directed medical therapies for the patient's CHF should be continued by my day colleagues/cardiology and consideration for starting the patient on Invokana (probably as an outpatient) would be prudent as a step toward the goal.  To repeat the patient's lab work focusing particularly on his renal function as well as trending of BNP.  I have ordered an echocardiogram to be performed in the a.m.  Daily weights will be performed.  Patient will be on a fluid restriction.  I have ordered strict intake and output  AMRIT  As noted above we will have to monitor the patient's renal function throughout admission and carefully consider how aggressive we are with diuresis.  Elevated troponin  We will perform serial troponins and EKGs to ensure there is no component of ischemic heart disease in this patient's presentation.  We will involve the cardiologist start the patient on ACS protocol as clinically appropriate.        Care Planning  Shared decision making: with the patient  Code status and discussions: Full    Disposition  Social Determinants of Health that impact treatment or disposition: Nil  I expect the patient to be discharged to Home in 2-4 days.       Dickson Nichols MD    Electronically signed by Dickson Nichols MD, 09/19/23, 8:51 AM CDT.                Electronically signed by Dickson Nichols MD at 09/19/23 4243          Emergency Department Notes        Natalie Thomas RN at 09/18/23 8740          Nursing report ED to floor  Collin Teresa  58 y.o.  male    HPI:   Chief Complaint   Patient presents with    Leg Swelling    Shortness of Breath    Abnormal Lab       Admitting doctor:   Dickson Nichols MD    Consulting provider(s):  Consults       No orders found from 8/20/2023 to 9/19/2023.             Admitting diagnosis:   The encounter diagnosis was Acute on chronic systolic congestive heart failure.    Code status:  "  Current Code Status       Date Active Code Status Order ID Comments User Context       Not on file            Allergies:   Dexamethasone    Intake and Output  No intake or output data in the 24 hours ending 09/18/23 2138    Weight:       09/18/23 1848   Weight: (!) 143 kg (316 lb)       Most recent vitals:   Vitals:    09/18/23 1848 09/18/23 1958 09/18/23 2052 09/18/23 2100   BP: 137/75 133/97 154/96 157/92   BP Location: Right arm      Patient Position: Sitting      Pulse: (!) 44 65 72 84   Resp: 16 18 18 20   Temp: 98 °F (36.7 °C)      TempSrc: Oral      SpO2: 96% 98% 98% 100%   Weight: (!) 143 kg (316 lb)      Height: 177.8 cm (70\")        Oxygen Therapy: room air    Active LDAs/IV Access:   Lines, Drains & Airways       Active LDAs       Name Placement date Placement time Site Days    Peripheral IV 09/18/23 1922 Anterior;Left Forearm 09/18/23 1922  Forearm  less than 1                    Labs (abnormal labs have a star):   Labs Reviewed   SINGLE HSTROPONIN T - Abnormal; Notable for the following components:       Result Value    HS Troponin T 38 (*)     All other components within normal limits    Narrative:     High Sensitive Troponin T Reference Range:  <10.0 ng/L- Negative Female for AMI  <15.0 ng/L- Negative Male for AMI  >=10 - Abnormal Female indicating possible myocardial injury.  >=15 - Abnormal Male indicating possible myocardial injury.   Clinicians would have to utilize clinical acumen, EKG, Troponin, and serial changes to determine if it is an Acute Myocardial Infarction or myocardial injury due to an underlying chronic condition.        MAGNESIUM - Normal   EXTRA TUBES    Narrative:     The following orders were created for panel order Extra Tubes.  Procedure                               Abnormality         Status                     ---------                               -----------         ------                     Lavender Aliyah[[id number]]                                     Final result     "           Gold Top - SST[[id number]]                                   Final result               Light Blue Top[[id number]]                                   Final result                 Please view results for these tests on the individual orders.   LAVENDER TOP   GOLD TOP - SST   LIGHT BLUE TOP       Meds given in ED:   Medications - No data to display  No current facility-administered medications for this encounter.       NIH Stroke Scale:       Isolation/Infection(s):  No active isolations   No active infections     COVID Testing  Collected   Resulted     Nursing report ED to floor:  Mental status: alert and orientedx4  Ambulatory status: independent  Precautions: n/a    ED nurse phone extentsipg- [phone]    Electronically signed by Natalie Thomas RN at 09/18/23 2188       Kevin Mcdaniel MD at 09/18/23 2010        Procedure Orders    1. ECG 12 Lead [[id number]] ordered by Kevin Mcdaniel MD    2. ECG 12 Lead [[id number]] ordered by Kevin Mcdaniel MD                 Subjective   History of Present Illness  Patient presents on advice from his primary care after labs revealed elevated creatinine and elevated troponin level today.  Patient went to his primary care today because on Friday, he became very short of breath and fatigued and has had a clear productive cough.  He endorses increasing bilateral lower extremity edema the last 2 weeks.  He saw Dr. Ricardo 2 weeks ago and was told he had a irregular heart rate at that time and started on some new medications.  Patient follows Dr. Ricardo for CHF.  He denies fever chills, abdominal pain, nausea or vomiting.    Review of Systems   Constitutional:  Positive for activity change and fatigue. Negative for chills and fever.   Respiratory:  Positive for cough.    Cardiovascular:  Positive for leg swelling.   Gastrointestinal:  Negative for nausea.   All other systems reviewed and are negative.    Past Medical History:   Diagnosis Date    Abdominal pain      Cardiomegaly     Chest pain     Essential hypertension     History of EKG 09/19/2016    care center    History of toxic effects     carbon monxide    Morbid obesity     Peripheral edema        Allergies   Allergen Reactions    Dexamethasone Other (See Comments)       Past Surgical History:   Procedure Laterality Date    CARDIAC CATHETERIZATION      2011 no cad    ENDOSCOPY         Family History   Problem Relation Age of Onset    Diabetes Other     Hypertension Other     Coronary artery disease Neg Hx        Social History     Socioeconomic History    Marital status:    Tobacco Use    Smoking status: Never    Smokeless tobacco: Never   Vaping Use    Vaping Use: Never used   Substance and Sexual Activity    Alcohol use: No    Drug use: No    Sexual activity: Defer           Objective   Physical Exam  Vitals and nursing note reviewed.   HENT:      Head: Normocephalic.      Mouth/Throat:      Mouth: Mucous membranes are moist.   Eyes:      Extraocular Movements: Extraocular movements intact.   Cardiovascular:      Rate and Rhythm: Normal rate. Rhythm irregular.      Pulses: Normal pulses.   Pulmonary:      Effort: Pulmonary effort is normal.      Breath sounds: Examination of the right-lower field reveals rales. Examination of the left-lower field reveals rales. Rales present.   Chest:      Chest wall: No tenderness.   Abdominal:      Palpations: Abdomen is soft.   Musculoskeletal:      Cervical back: Normal range of motion.      Right lower leg: Edema (2+ prox knee distally) present.      Left lower leg: Edema (2+ prox knee distally) present.   Skin:     General: Skin is warm.      Capillary Refill: Capillary refill takes less than 2 seconds.   Neurological:      General: No focal deficit present.      Mental Status: He is alert.   Psychiatric:         Mood and Affect: Mood normal.       ECG 12 Lead      Date/Time: 9/18/2023 6:49 PM  Performed by: Kevin Mcdaniel MD  Authorized by: Kevin Mcdaniel,  MD   Interpreted by physician  Comparison: compared with previous ECG from 8/21/2021  Rhythm: atrial fibrillation  Comments: A-fib ventricular rate 81 bpm,  ms, QTc 47 ms, no ST elevations.  No STEMI.  A-fib is new from previous EKG on file.  This is my independent interpretation    ECG 12 Lead      Date/Time: 9/18/2023 7:25 PM  Performed by: Kevin Mcdaniel MD  Authorized by: Kevin Mcdaniel MD   Interpreted by physician  Comparison: compared with previous ECG from 9/18/2023  Rhythm: atrial fibrillation  Comments: A fib ventricular rate 80 bpm,  ms, QTc 512 ms, no ST elevations.  No STEMI.  A-fib persists.  This is my independent interpretation.            ED Course           Vitals:    09/19/23 0330 09/19/23 0400 09/19/23 0430 09/19/23 0544   BP: 159/85 141/74 144/77 152/65   Pulse: 62 66 62 61   Resp: 18 18 18 20   Temp:       TempSrc:       SpO2: 96% 97% 95% 97%   Weight:       Height:          Lab Results (last 24 hours)       Procedure Component Value Units Date/Time    Extra Tubes [[id number]] Collected: 09/18/23 1924    Specimen: Blood, Venous Line Updated: 09/18/23 2031    Narrative:      The following orders were created for panel order Extra Tubes.  Procedure                               Abnormality         Status                     ---------                               -----------         ------                     Lavender Top[[id number]]                                     Final result               Gold Top - SST[[id number]]                                   Final result               Light Blue Top[[id number]]                                   Final result                 Please view results for these tests on the individual orders.    Gold Top - SST [[id number]] Collected: 09/18/23 1924    Specimen: Blood Updated: 09/18/23 2031     Extra Tube Hold for add-ons.     Comment: Auto resulted.       Light Blue Top [[id number]] Collected: 09/18/23 1924    Specimen: Blood Updated:  09/18/23 2031     Extra Tube Hold for add-ons.     Comment: Auto resulted       Lavender Top [[id number]] Collected: 09/18/23 1924    Specimen: Blood Updated: 09/18/23 2031     Extra Tube hold for add-on     Comment: Auto resulted       Single High Sensitivity Troponin T [[id number]]  (Abnormal) Collected: 09/18/23 1924    Specimen: Blood Updated: 09/18/23 2001     HS Troponin T 38 ng/L     Narrative:      High Sensitive Troponin T Reference Range:  <10.0 ng/L- Negative Female for AMI  <15.0 ng/L- Negative Male for AMI  >=10 - Abnormal Female indicating possible myocardial injury.  >=15 - Abnormal Male indicating possible myocardial injury.   Clinicians would have to utilize clinical acumen, EKG, Troponin, and serial changes to determine if it is an Acute Myocardial Infarction or myocardial injury due to an underlying chronic condition.         Magnesium [[id number]]  (Normal) Collected: 09/18/23 1924    Specimen: Blood Updated: 09/18/23 1945     Magnesium 2.3 mg/dL            XR Chest 1 View    Result Date: 9/18/2023  1. Mild vascular congestion without focal infiltrate.                                     Medical Decision Making  Patient with lower extremity edema, mild pulmonary vascular congestion, elevated BNP, and elevated creatinine.  No recent baseline labs for comparison.  Patient with stable vital signs on room air while in department.  Discussed case with hospitalist who accepts patient for admission.    Problems Addressed:  Acute on chronic systolic congestive heart failure: complicated acute illness or injury    Amount and/or Complexity of Data Reviewed  Labs: ordered.  Radiology: ordered.  ECG/medicine tests: ordered and independent interpretation performed.    Risk  Decision regarding hospitalization.        Final diagnoses:   Acute on chronic systolic congestive heart failure       ED Disposition  ED Disposition       ED Disposition   Decision to Admit    Condition   --    Comment   Level of Care:  Telemetry [5]   Diagnosis: CHF exacerbation [796584]   Admitting Physician: AUREA ENCARNACION [[id number]]   Attending Physician: AUREA ENCARNACION [[id number]]                 No follow-up provider specified.       Medication List      No changes were made to your prescriptions during this visit.       This document has been electronically signed by Kevin Mcdaniel MD on September 19, 2023 05:55 CDT  .si  Gnatureline  Part of this note may be an electronic transcription/translation of spoken language to printed text using the Dragon Dictation System.          Kevin Mcdaniel MD  09/19/23 0555      Electronically signed by Kevin Mcdaniel MD at 09/19/23 0555       Thierry Sandoval at 09/18/23 1854          EKG Completed in triage at 1849    Electronically signed by Thierry Sandoval at 09/18/23 1854       Lab Results (last 24 hours)       Procedure Component Value Units Date/Time    Extra Tubes [[id number]] Collected: 09/20/23 1101    Specimen: Blood, Venous Line Updated: 09/20/23 1215    Narrative:      The following orders were created for panel order Extra Tubes.  Procedure                               Abnormality         Status                     ---------                               -----------         ------                     Lavender Top[[id number]]                                     Final result                 Please view results for these tests on the individual orders.    Lavender Top [[id number]] Collected: 09/20/23 1101    Specimen: Blood Updated: 09/20/23 1215     Extra Tube hold for add-on     Comment: Auto resulted       Magnesium [[id number]]  (Normal) Collected: 09/20/23 1057    Specimen: Blood Updated: 09/20/23 1133     Magnesium 1.9 mg/dL     Basic Metabolic Panel [[id number]]  (Abnormal) Collected: 09/20/23 1057    Specimen: Blood Updated: 09/20/23 1133     Glucose 116 mg/dL      BUN 23 mg/dL      Creatinine 1.90 mg/dL      Sodium 137 mmol/L      Potassium 3.6 mmol/L      Chloride 100  mmol/L      CO2 26.0 mmol/L      Calcium 9.0 mg/dL      BUN/Creatinine Ratio 12.1     Anion Gap 11.0 mmol/L      eGFR 40.4 mL/min/1.73     Narrative:      GFR Normal >60  Chronic Kidney Disease <60  Kidney Failure <15      Extra Tubes [[id number]] Collected: 09/20/23 0740    Specimen: Blood, Venous Line Updated: 09/20/23 0845    Narrative:      The following orders were created for panel order Extra Tubes.  Procedure                               Abnormality         Status                     ---------                               -----------         ------                     Lavender Top[[id number]]                                     Final result                 Please view results for these tests on the individual orders.    Lavender Top [[id number]] Collected: 09/20/23 0740    Specimen: Blood Updated: 09/20/23 0845     Extra Tube hold for add-on     Comment: Auto resulted       BNP [[id number]]  (Abnormal) Collected: 09/20/23 0710    Specimen: Blood Updated: 09/20/23 0758     proBNP 1,097.0 pg/mL     Narrative:      Among patients with dyspnea, NT-proBNP is highly sensitive for the detection of acute congestive heart failure. In addition NT-proBNP of <300 pg/ml effectively rules out acute congestive heart failure with 99% negative predictive value.            Imaging Results (Last 24 Hours)       Procedure Component Value Units Date/Time    US Renal Bilateral [[id number]] Collected: 09/20/23 1429     Updated: 09/20/23 1429    Narrative:      INDICATION:  ckd 3 a esequiel.    COMPARISON:  None relevant.    TECHNIQUE:  High-resolution grayscale and color Doppler ultrasound was performed of the  bilateral kidneys and the urinary bladder.    FINDINGS:  Left kidney contains a simple appearing cyst 6.4 cm; kidneys otherwise appear  normal.  Renal lengths measure 11.3 cm on the right and 11.7 cm on the left.    Urinary bladder appears normal.      Impression:      Left simple renal cyst.                ECG/EMG Results (last 24  hours)       Procedure Component Value Units Date/Time    Adult Transthoracic Echo Complete With Contrast if Necessary Per Protocol [[id number]] Resulted: 09/19/23 1913     Updated: 09/19/23 1931     EF(MOD-bp) 26.7 %      LVIDd 6.6 cm      LVIDs 5.8 cm      IVSd 1.29 cm      LVPWd 1.20 cm      FS 12.7 %      IVS/LVPW 1.07 cm      ESV(cubed) 190.4 ml      LV Sys Vol (BSA corrected) 56.5 cm2      EDV(cubed) 286.1 ml      LV Wilde Vol (BSA corrected) 76.4 cm2      LVOT area 4.2 cm2      LV mass(C)d 385.8 grams      LVOT diam 2.31 cm      EDV(MOD-sp2) 263.0 ml      EDV(MOD-sp4) 192.0 ml      ESV(MOD-sp2) 200.0 ml      ESV(MOD-sp4) 142.0 ml      SV(MOD-sp2) 63.0 ml      SV(MOD-sp4) 50.0 ml      SI(MOD-sp2) 25.1 ml/m2      SI(MOD-sp4) 19.9 ml/m2      EF(MOD-sp2) 24.0 %      EF(MOD-sp4) 26.0 %      MV E max juan 70.3 cm/sec      Med Peak E' Juan 4.6 cm/sec      Lat Peak E' Juan 9.0 cm/sec      Avg E/e' ratio 10.34     SV(LVOT) 62.1 ml      RVIDd 4.6 cm      LA dimension (2D)  5.9 cm      LV V1 max 75.8 cm/sec      LV V1 max PG 2.30 mmHg      LV V1 mean PG 1.00 mmHg      LV V1 VTI 14.8 cm      Ao pk juan 117.0 cm/sec      Ao max PG 5.5 mmHg      Ao mean PG 3.0 mmHg      Ao V2 VTI 23.1 cm      ASHLEE(I,D) 2.7 cm2      MV max PG 3.3 mmHg      MV mean PG 1.08 mmHg      MV V2 VTI 18.6 cm      MV P1/2t 89.5 msec      MVA(P1/2t) 2.46 cm2      MVA(VTI) 3.3 cm2      MV dec slope 293.0 cm/sec2      MR max juan 332.6 cm/sec      MR max PG 44.2 mmHg      TR max juan 289.1 cm/sec      TR max PG 33.4 mmHg      RVSP(TR) 43.4 mmHg      RAP systole 10.0 mmHg      PA V2 max 84.2 cm/sec      Ao root diam 3.9 cm      ACS 2.49 cm      STJ 3.3 cm     Narrative:        Left ventricular ejection fraction appears to be 21 - 25%.    The left ventricular cavity is borderline dilated.    The following left ventricular wall segments are hypokinetic: mid   anterior, apical anterior, basal anterolateral, mid anterolateral, apical   lateral, basal inferolateral,  mid inferolateral, apical inferior, mid   inferior, apical septal, basal inferoseptal, mid inferoseptal, apex   hypokinetic, mid anteroseptal, basal anterior, basal inferior and basal   inferoseptal.    Left ventricular diastolic function was normal.    The left atrial cavity is moderately dilated.    The right atrial cavity is mildly  dilated.    Estimated right ventricular systolic pressure from tricuspid   regurgitation is mildly elevated (35-45 mmHg).    Mild pulmonary hypertension is present.      SCANNED EKG [[id number]] Resulted: 09/18/23     Updated: 09/19/23 2008    SCANNED EKG [[id number]] Resulted: 09/18/23     Updated: 09/19/23 2008    ECG 12 Lead Rhythm Change [[id number]] Collected: 09/19/23 1934     Updated: 09/19/23 2010     QT Interval 534 ms      QTC Interval 510 ms     Narrative:      Test Reason : Rhythm Change  Blood Pressure :   */*   mmHG  Vent. Rate :  55 BPM     Atrial Rate : 277 BPM     P-R Int :   * ms          QRS Dur : 130 ms      QT Int : 534 ms       P-R-T Axes :   * -77 159 degrees     QTc Int : 510 ms    Atrial fibrillation with slow ventricular response  Left axis deviation  Nonspecific intraventricular block  Inferior infarct , age undetermined  Cannot rule out Anterior infarct , age undetermined  T wave abnormality, consider lateral ischemia  Abnormal ECG  When compared with ECG of 18-SEP-2023 19:25,  Atrial fibrillation has replaced Wide QRS rhythm    Referred By:            Confirmed By:     SCANNED - TELEMETRY   [[id number]] Resulted: 09/18/23     Updated: 09/20/23 1417    SCANNED - TELEMETRY   [[id number]] Resulted: 09/18/23     Updated: 09/20/23 1434    SCANNED - TELEMETRY   [[id number]] Resulted: 09/18/23     Updated: 09/20/23 1434    SCANNED - TELEMETRY   [[id number]] Resulted: 09/18/23     Updated: 09/20/23 1438             Physician Progress Notes (last 24 hours)        Rachid Garza MD at 09/20/23 1046              [facility]  Medicine Services  INPATIENT PROGRESS NOTE    Length of Stay: 0  Date of Admission: 9/18/2023  Primary Care Physician: Zakiya George,     Subjective   (S) Admitted for chest pain and shortness of breath; diagnosed with congestive heart failure  Today, he is tolerating laying flat; no chest pain reported     Review of Systems   All other systems reviewed and are negative.     All pertinent negatives and positives are as above. All other systems have been reviewed and are negative unless otherwise stated.     Prior to Admission medications    Medication Sig Start Date End Date Taking? Authorizing Provider   amiodarone (PACERONE) 200 MG tablet Take 1 tablet by mouth Every 12 (Twelve) Hours. 8/15/23  Yes Yaneth Mahmood MD   carvedilol (COREG) 25 MG tablet TAKE ONE TABLET BY MOUTH TWO TIMES A DAY WITH MEALS 6/20/23  Yes Norma Olivarez APRN   Corlanor 5 MG tablet tablet TAKE ONE TABLET BY MOUTH TWO TIMES A DAY WITH MEALS 3/23/22  Yes Noemi Key APRN   Eliquis 5 MG tablet tablet Take 1 tablet by mouth Every 12 (Twelve) Hours. 8/15/23  Yes Yaneth Mahmood MD   furosemide (LASIX) 40 MG tablet Take 1 tablet by mouth Daily As Needed (weight gain of 3lbs overnight or 5lbs in a week.). 1/19/22  Yes Noemi Key APRN   hydrALAZINE (APRESOLINE) 25 MG tablet Take 1 tablet by mouth 3 (Three) Times a Day. 8/31/21  Yes Noemi Key APRN   isosorbide mononitrate (IMDUR) 30 MG 24 hr tablet TAKE 1 TABLET BY MOUTH DAILY. 10/4/22  Yes Norma Olivarez APRN   spironolactone (ALDACTONE) 25 MG tablet Take 1 tablet by mouth Daily. 8/23/22  Yes Norma Olivarez APRN   albuterol sulfate  (90 Base) MCG/ACT inhaler Inhale 2 puffs Every 4 (Four) Hours As Needed for Wheezing or Shortness of Air. 1/20/22   Noemi Key APRN   aspirin 81 MG chewable tablet Chew 1 tablet Daily.    ProviderYaneth MD   brompheniramine-pseudoephedrine-DM 30-2-10 MG/5ML syrup Take 2.5 mL by mouth 4 (Four)  Times a Day As Needed for Congestion or Allergies. 1/20/22   Noemi Key APRN   magnesium oxide (MAG-OX) 400 MG tablet Take 1 tablet by mouth Daily.    Provider, MD Yaneth       albumin human, 25 g, Intravenous, BID  amiodarone, 200 mg, Oral, Q12H  apixaban, 5 mg, Oral, Q12H  aspirin, 81 mg, Oral, Daily  carvedilol, 25 mg, Oral, BID With Meals  furosemide, 40 mg, Intravenous, BID  isosorbide mononitrate, 30 mg, Oral, Daily  sacubitril-valsartan, 1 tablet, Oral, Q12H  sodium chloride, 10 mL, Intravenous, Q12H  spironolactone, 25 mg, Oral, Daily      DOBUTamine, 8 mcg/kg/min, Last Rate: 8 mcg/kg/min (09/20/23 1048)        Objective    Temp:  [97.4 °F (36.3 °C)-98.4 °F (36.9 °C)] 98.4 °F (36.9 °C)  Heart Rate:  [52-90] 72  Resp:  [16-20] 18  BP: (102-130)/(63-74) 130/63    Physical Exam  Vitals reviewed.   Constitutional:       Appearance: He is obese.   HENT:      Head: Normocephalic.      Nose: Nose normal.      Mouth/Throat:      Mouth: Mucous membranes are moist.   Eyes:      Extraocular Movements: Extraocular movements intact.   Cardiovascular:      Rate and Rhythm: Normal rate. Rhythm irregular.      Heart sounds: Normal heart sounds.   Pulmonary:      Breath sounds: Rales present.   Abdominal:      General: Bowel sounds are normal.      Palpations: Abdomen is soft.   Musculoskeletal:         General: Normal range of motion.      Cervical back: Neck supple.      Right lower leg: Edema present.      Left lower leg: Edema present.   Neurological:      General: No focal deficit present.      Mental Status: He is alert and oriented to person, place, and time.   Psychiatric:         Behavior: Behavior normal.     Results Review:  I have reviewed the labs, radiology results, and diagnostic studies.    Laboratory Data:   Results from last 7 days   Lab Units 09/19/23  0521 09/18/23  1520   SODIUM mmol/L 140 141   POTASSIUM mmol/L 4.0 4.6   CHLORIDE mmol/L 103 104   CO2 mmol/L 24.0 26.0   BUN mg/dL 29* 33*    CREATININE mg/dL 1.90* 2.22*   GLUCOSE mg/dL 89 105*   CALCIUM mg/dL 9.4 9.6   BILIRUBIN mg/dL  --  1.1   ALK PHOS U/L  --  68   ALT (SGPT) U/L  --  22   AST (SGOT) U/L  --  21   ANION GAP mmol/L 13.0 11.0     Estimated Creatinine Clearance: 60.5 mL/min (A) (by C-G formula based on SCr of 1.9 mg/dL (H)).  Results from last 7 days   Lab Units 09/18/23  1924   MAGNESIUM mg/dL 2.3         Results from last 7 days   Lab Units 09/19/23  0521 09/18/23  0320   WBC 10*3/mm3 7.35 6.74   HEMOGLOBIN g/dL 14.7 15.6   HEMATOCRIT % 45.9 48.4   PLATELETS 10*3/mm3 192 198           Culture Data:   No results found for: BLOODCX  No results found for: URINECX  No results found for: RESPCX  No results found for: WOUNDCX  No results found for: STOOLCX  No components found for: BODYFLD    Radiology Data:   Imaging Results (Last 24 Hours)       ** No results found for the last 24 hours. **            I have reviewed the patient's current medications.     Assessment/Plan     Chest pain      In the setting of dilated cardiomyopathy     No chest pain at this time; on dobutamine drip     Plan for nuclear stress test    Acute on chronic systolic CHF     In the setting of AMRIT on CKD     Blood pressure has been stalbe     Continue with lasix and albumin     Mild improvement yesterday; ordered BMP for today     AMRIT on CKD     Likely due to above in the setting of diabetes and hypertension     Improving a little with current treament     Nephrologist consulted     Chronic medical problems     Essential hypertension     Chronic a fib      Type II DM    Medical Decision Making  Number and Complexity of problems: two major complexes  Differential Diagnosis: ACS    Conditions and Status:        Condition is improving.     MDM Data  External documents reviewed: previous medical records  My EKG interpretation: n/a  My plain film interpretation: vascular congestion     Discussed with: RN     Treatment Plan  See above    Care Planning  Shared decision  making: his wife   Code status and discussions: full code    Disposition  Social Determinants of Health that impact treatment or disposition: none  I expect the patient to be discharge: in progress    I confirmed that the patient's Advance Care Plan is present, code status is documented, or surrogate decision maker is listed in the patient's medical record.     Rachid Garza MD      Electronically signed by Rachid Garza MD at 09/20/23 1057       Juma Ricardo MD at 09/20/23 0859          Patient will be continued on IV dobutamine infusion and albumin with Lasix.  Patient last coronary angiogram was done in 2011 which did not reveal of any evidence of any obstructive epicardial coronary artery disease.  Patient would undergo a myocardial perfusion Lexiscan Cardiolite stress test.    Patient would benefit from nephrology evaluation.  Patient has remained in atrial fibrillation.    Electronically signed by Juma Ricardo MD at 09/20/23 0900       Juma Ricardo MD at 09/20/23 0634          Risk-benefit treatment options were discussed with the patient, all question answered to patient's and family's satisfaction and an informed consent was obtained for the procedure.    Myocardial perfusion scintigraphy (MPS)  was recommended to the patient as the best non-invasive modality for the assessment of obstructive CAD.  I  did spend some time discussing the procedure, as well as risks and benefits.  I also informed the patient that the risk of nonfatal MI or major cardiac complication is about  0.02%. The patient was also informed about the risks and benefits of MPS. Patient was also provided with a handout describing the test, as well as patient instructions prior to testing.      I also discussed the results of MPS as divided into risks.The NPV of this test is as high as 98%.  I also specifically discussed the risk of cardiac death with the following percentages:    Low-risk MPS:                           0.5% per year  Mildly abnormal MPS:              2.7%  Moderately abnormal:             2.9%  Severely abnormal:                 4.2%     Electronically signed by Juma Ricardo MD at 09/20/23 0682       Medical Student Notes (last 24 hours)  Notes from 09/19/23 1540 through 09/20/23 1540   No notes of this type exist for this encounter.       Consult Notes (last 24 hours)  Notes from 09/19/23 1540 through 09/20/23 1540   No notes of this type exist for this encounter.

## 2023-09-20 NOTE — PLAN OF CARE
Goal Outcome Evaluation:  Plan of Care Reviewed With: patient       Comments: The patient is on a a heart healthy, regular texture, thin liquids diet with a 1500 ml fluid restriction. Ht: 71 inches wt: 308 BMI:42.96 His average meal intake has been 30-40% since admission. He admitted with a diagnosis of atrial fibrillation, CHF, Diabetes mellitus type 2, hypertension, and dilated cardiomyopathy Current labs: 9/20/23 bun 23, creatinine 1.90, GFR 40.4, 9/18/23 Hgba1c 6.4, HDL 28 meds: lasix, spironolactone Estimated need for calories: 15-20 kcals/kg= 8726-7988 kcals, fluid: 1500 ml fluid restriction protein: 0.8-1.0 grams/kg= 111-139.7 grams. Provided patient with low sodium diet education.

## 2023-09-20 NOTE — PROGRESS NOTES
[facility] Medicine Services  INPATIENT PROGRESS NOTE    Length of Stay: 0  Date of Admission: 9/18/2023  Primary Care Physician: Zkaiya George DO    Subjective   (S) Admitted for chest pain and shortness of breath; diagnosed with congestive heart failure  Today, he is tolerating laying flat; no chest pain reported     Review of Systems   All other systems reviewed and are negative.     All pertinent negatives and positives are as above. All other systems have been reviewed and are negative unless otherwise stated.     Prior to Admission medications    Medication Sig Start Date End Date Taking? Authorizing Provider   amiodarone (PACERONE) 200 MG tablet Take 1 tablet by mouth Every 12 (Twelve) Hours. 8/15/23  Yes ProviderYaneth MD   carvedilol (COREG) 25 MG tablet TAKE ONE TABLET BY MOUTH TWO TIMES A DAY WITH MEALS 6/20/23  Yes Norma Olivarez APRN   Corlanor 5 MG tablet tablet TAKE ONE TABLET BY MOUTH TWO TIMES A DAY WITH MEALS 3/23/22  Yes Noemi Key APRN   Eliquis 5 MG tablet tablet Take 1 tablet by mouth Every 12 (Twelve) Hours. 8/15/23  Yes ProviderYaneth MD   furosemide (LASIX) 40 MG tablet Take 1 tablet by mouth Daily As Needed (weight gain of 3lbs overnight or 5lbs in a week.). 1/19/22  Yes Noemi Key APRN   hydrALAZINE (APRESOLINE) 25 MG tablet Take 1 tablet by mouth 3 (Three) Times a Day. 8/31/21  Yes Noemi Key APRN   isosorbide mononitrate (IMDUR) 30 MG 24 hr tablet TAKE 1 TABLET BY MOUTH DAILY. 10/4/22  Yes Norma Olivarez APRN   spironolactone (ALDACTONE) 25 MG tablet Take 1 tablet by mouth Daily. 8/23/22  Yes Norma Olivarez APRN   albuterol sulfate  (90 Base) MCG/ACT inhaler Inhale 2 puffs Every 4 (Four) Hours As Needed for Wheezing or Shortness of Air. 1/20/22   Noemi Key APRN   aspirin 81 MG chewable tablet Chew 1 tablet Daily.    ProviderYaneth MD   brompheniramine-pseudoephedrine-DM  30-2-10 MG/5ML syrup Take 2.5 mL by mouth 4 (Four) Times a Day As Needed for Congestion or Allergies. 1/20/22   Noemi Key APRN   magnesium oxide (MAG-OX) 400 MG tablet Take 1 tablet by mouth Daily.    Provider, MD Yaneth       albumin human, 25 g, Intravenous, BID  amiodarone, 200 mg, Oral, Q12H  apixaban, 5 mg, Oral, Q12H  aspirin, 81 mg, Oral, Daily  carvedilol, 25 mg, Oral, BID With Meals  furosemide, 40 mg, Intravenous, BID  isosorbide mononitrate, 30 mg, Oral, Daily  sacubitril-valsartan, 1 tablet, Oral, Q12H  sodium chloride, 10 mL, Intravenous, Q12H  spironolactone, 25 mg, Oral, Daily      DOBUTamine, 8 mcg/kg/min, Last Rate: 8 mcg/kg/min (09/20/23 1048)        Objective    Temp:  [97.4 °F (36.3 °C)-98.4 °F (36.9 °C)] 98.4 °F (36.9 °C)  Heart Rate:  [52-90] 72  Resp:  [16-20] 18  BP: (102-130)/(63-74) 130/63    Physical Exam  Vitals reviewed.   Constitutional:       Appearance: He is obese.   HENT:      Head: Normocephalic.      Nose: Nose normal.      Mouth/Throat:      Mouth: Mucous membranes are moist.   Eyes:      Extraocular Movements: Extraocular movements intact.   Cardiovascular:      Rate and Rhythm: Normal rate. Rhythm irregular.      Heart sounds: Normal heart sounds.   Pulmonary:      Breath sounds: Rales present.   Abdominal:      General: Bowel sounds are normal.      Palpations: Abdomen is soft.   Musculoskeletal:         General: Normal range of motion.      Cervical back: Neck supple.      Right lower leg: Edema present.      Left lower leg: Edema present.   Neurological:      General: No focal deficit present.      Mental Status: He is alert and oriented to person, place, and time.   Psychiatric:         Behavior: Behavior normal.     Results Review:  I have reviewed the labs, radiology results, and diagnostic studies.    Laboratory Data:   Results from last 7 days   Lab Units 09/19/23  0521 09/18/23  1520   SODIUM mmol/L 140 141   POTASSIUM mmol/L 4.0 4.6   CHLORIDE mmol/L 103  104   CO2 mmol/L 24.0 26.0   BUN mg/dL 29* 33*   CREATININE mg/dL 1.90* 2.22*   GLUCOSE mg/dL 89 105*   CALCIUM mg/dL 9.4 9.6   BILIRUBIN mg/dL  --  1.1   ALK PHOS U/L  --  68   ALT (SGPT) U/L  --  22   AST (SGOT) U/L  --  21   ANION GAP mmol/L 13.0 11.0     Estimated Creatinine Clearance: 60.5 mL/min (A) (by C-G formula based on SCr of 1.9 mg/dL (H)).  Results from last 7 days   Lab Units 09/18/23  1924   MAGNESIUM mg/dL 2.3         Results from last 7 days   Lab Units 09/19/23  0521 09/18/23  0320   WBC 10*3/mm3 7.35 6.74   HEMOGLOBIN g/dL 14.7 15.6   HEMATOCRIT % 45.9 48.4   PLATELETS 10*3/mm3 192 198           Culture Data:   No results found for: BLOODCX  No results found for: URINECX  No results found for: RESPCX  No results found for: WOUNDCX  No results found for: STOOLCX  No components found for: BODYFLD    Radiology Data:   Imaging Results (Last 24 Hours)       ** No results found for the last 24 hours. **            I have reviewed the patient's current medications.     Assessment/Plan     Chest pain      In the setting of dilated cardiomyopathy     No chest pain at this time; on dobutamine drip     Plan for nuclear stress test    Acute on chronic systolic CHF     In the setting of AMRIT on CKD     Blood pressure has been stalbe     Continue with lasix and albumin     Mild improvement yesterday; ordered BMP for today     AMRIT on CKD     Likely due to above in the setting of diabetes and hypertension     Improving a little with current treament     Nephrologist consulted     Chronic medical problems     Essential hypertension     Chronic a fib      Type II DM    Medical Decision Making  Number and Complexity of problems: two major complexes  Differential Diagnosis: ACS    Conditions and Status:        Condition is improving.     MDM Data  External documents reviewed: previous medical records  My EKG interpretation: n/a  My plain film interpretation: vascular congestion     Discussed with: RN     Treatment  Plan  See above    Care Planning  Shared decision making: his wife   Code status and discussions: full code    Disposition  Social Determinants of Health that impact treatment or disposition: none  I expect the patient to be discharge: in progress    I confirmed that the patient's Advance Care Plan is present, code status is documented, or surrogate decision maker is listed in the patient's medical record.     Rachid Garza MD

## 2023-09-20 NOTE — CONSULTS
[facility]  [address] [place] [address]. [address]  T - [phone]  F  [phone]     Consultation         PATIENT  DEMOGRAPHICS   PATIENT NAME: Collin Sullivanjohnnie Parkermandy                      PHYSICIAN: ANNA MARIE Covington  : 1965  MRN: [id number]    Subjective   SUBJECTIVE   Referring Provider: Dr. Garza  Reason for Consultation: CKD  History of present illness: This is a 58-year-old male with a past medical history significant for hypertension, cardiomegaly, previous cardiac catheterization with no evidence of CAD who presented to the hospital on the  with complaints of 2 weeks of worsening dyspnea, weight gain, lower extremity edema.  He had seen his outpatient physician and started diuretics but stated they did not work very well.  On evaluation here he was admitted with acute heart failure.  He has been seen by cardiology and started on dobutamine as well as diuretics and Entresto.  Also on spironolactone.  He has some AMRIT and nephrology is consulted today to help manage.    Past Medical History:   Diagnosis Date    Abdominal pain     Cardiomegaly     Chest pain     Essential hypertension     History of EKG 2016    care center    History of toxic effects     carbon monxide    Morbid obesity     Peripheral edema      Past Surgical History:   Procedure Laterality Date    CARDIAC CATHETERIZATION       no cad    ENDOSCOPY       Family History   Problem Relation Age of Onset    Diabetes Other     Hypertension Other     Coronary artery disease Neg Hx      Social History     Tobacco Use    Smoking status: Never    Smokeless tobacco: Never   Vaping Use    Vaping Use: Never used   Substance Use Topics    Alcohol use: No    Drug use: No     Allergies:  Dexamethasone     REVIEW OF SYSTEMS    Review of Systems   Respiratory:  Positive for shortness of breath.    Cardiovascular:  Positive for leg swelling.   All other systems reviewed and are negative.    Objective   OBJECTIVE   Vital  "Signs  Temp:  [97.4 °F (36.3 °C)-98.4 °F (36.9 °C)] 98.4 °F (36.9 °C)  Heart Rate:  [52-90] 72  Resp:  [16-20] 18  BP: (102-130)/(63-74) 130/63    Flowsheet Rows      Flowsheet Row First Filed Value   Admission Height 177.8 cm (70\") Documented at 09/18/2023 1848   Admission Weight 143 kg (316 lb) Documented at 09/18/2023 1848             I/O last 3 completed shifts:  In: 240 [P.O.:240]  Out: 400 [Urine:400]    PHYSICAL EXAM    Physical Exam  Constitutional:       Appearance: He is well-developed.   HENT:      Head: Normocephalic and atraumatic.   Eyes:      Conjunctiva/sclera: Conjunctivae normal.      Pupils: Pupils are equal, round, and reactive to light.   Cardiovascular:      Rate and Rhythm: Normal rate and regular rhythm.   Pulmonary:      Effort: Pulmonary effort is normal.      Breath sounds: Normal breath sounds.   Abdominal:      Palpations: Abdomen is soft.   Musculoskeletal:      Cervical back: Neck supple.      Right lower leg: Edema present.      Left lower leg: Edema present.   Skin:     General: Skin is warm and dry.   Neurological:      Mental Status: He is alert and oriented to person, place, and time.   Psychiatric:         Mood and Affect: Mood normal.         Behavior: Behavior normal.       RESULTS   Results Review:    Results from last 7 days   Lab Units 09/19/23  0521 09/18/23  1520   SODIUM mmol/L 140 141   POTASSIUM mmol/L 4.0 4.6   CHLORIDE mmol/L 103 104   CO2 mmol/L 24.0 26.0   BUN mg/dL 29* 33*   CREATININE mg/dL 1.90* 2.22*   CALCIUM mg/dL 9.4 9.6   BILIRUBIN mg/dL  --  1.1   ALK PHOS U/L  --  68   ALT (SGPT) U/L  --  22   AST (SGOT) U/L  --  21   GLUCOSE mg/dL 89 105*       Estimated Creatinine Clearance: 60.5 mL/min (A) (by C-G formula based on SCr of 1.9 mg/dL (H)).    Results from last 7 days   Lab Units 09/18/23  1924   MAGNESIUM mg/dL 2.3             Results from last 7 days   Lab Units 09/19/23  0521 09/18/23  0914 09/18/23  0320   WBC 10*3/mm3 7.35 6.5 6.74   HEMOGLOBIN g/dL " 14.7 14.6 15.6   PLATELETS 10*3/mm3 192 208 198              MEDICATIONS    albumin human, 25 g, Intravenous, BID  amiodarone, 200 mg, Oral, Q12H  apixaban, 5 mg, Oral, Q12H  aspirin, 81 mg, Oral, Daily  carvedilol, 25 mg, Oral, BID With Meals  furosemide, 40 mg, Intravenous, BID  isosorbide mononitrate, 30 mg, Oral, Daily  sacubitril-valsartan, 1 tablet, Oral, Q12H  sodium chloride, 10 mL, Intravenous, Q12H  spironolactone, 25 mg, Oral, Daily      DOBUTamine, 8 mcg/kg/min, Last Rate: 8 mcg/kg/min (09/20/23 1048)      Medications Prior to Admission   Medication Sig Dispense Refill Last Dose    amiodarone (PACERONE) 200 MG tablet Take 1 tablet by mouth Every 12 (Twelve) Hours.       carvedilol (COREG) 25 MG tablet TAKE ONE TABLET BY MOUTH TWO TIMES A DAY WITH MEALS 180 tablet 0 9/18/2023    Corlanor 5 MG tablet tablet TAKE ONE TABLET BY MOUTH TWO TIMES A DAY WITH MEALS 60 tablet 11 9/18/2023    Eliquis 5 MG tablet tablet Take 1 tablet by mouth Every 12 (Twelve) Hours.       furosemide (LASIX) 40 MG tablet Take 1 tablet by mouth Daily As Needed (weight gain of 3lbs overnight or 5lbs in a week.). 90 tablet 3 9/18/2023    hydrALAZINE (APRESOLINE) 25 MG tablet Take 1 tablet by mouth 3 (Three) Times a Day. 270 tablet 2 9/18/2023    isosorbide mononitrate (IMDUR) 30 MG 24 hr tablet TAKE 1 TABLET BY MOUTH DAILY. 90 tablet 3 9/18/2023    spironolactone (ALDACTONE) 25 MG tablet Take 1 tablet by mouth Daily. 90 tablet 0 9/18/2023    albuterol sulfate  (90 Base) MCG/ACT inhaler Inhale 2 puffs Every 4 (Four) Hours As Needed for Wheezing or Shortness of Air. 6.7 g 11 Unknown    aspirin 81 MG chewable tablet Chew 1 tablet Daily.       brompheniramine-pseudoephedrine-DM 30-2-10 MG/5ML syrup Take 2.5 mL by mouth 4 (Four) Times a Day As Needed for Congestion or Allergies. 473 mL 3 Unknown    magnesium oxide (MAG-OX) 400 MG tablet Take 1 tablet by mouth Daily.        Assessment & Plan   ASSESSMENT / PLAN      Peripheral edema     AMRIT (acute kidney injury)    Elevated troponin    Pulmonary vascular congestion    1.  AMRIT on CKD- Baseline creatinine 1.3-1.4, creatinine up to greater than 2 on arrival and now 1.9.  Etiology of AMRIT possibly cardiorenal versus progression of CKD.  We will check renal ultrasound, agree with dobutamine and will continue Lasix 40 mg IV twice daily.  Keep Entresto and spironolactone for now.  Give albumin.    2.  CHF- Dr. Ricardo following, plan for Cardiolite stress test    3.  Hypertension    4.  DM2    5.  Dilated cardiomyopathy    6.  Chronic A-fib      Thank you for the consult, we will continue to follow the patient.         I discussed the patients findings and my recommendations with patient      This document has been electronically signed by ANNA MARIE Covington on September 20, 2023 11:27 CDT      For this patient encounter, I have reviewed the Nurse Practitioner's documentation, medical decision making, and treatment plan and personally spent time with the patient.

## 2023-09-21 ENCOUNTER — APPOINTMENT (OUTPATIENT)
Dept: NUCLEAR MEDICINE | Facility: HOSPITAL | Age: 58
DRG: 291 | End: 2023-09-21
Payer: COMMERCIAL

## 2023-09-21 ENCOUNTER — APPOINTMENT (OUTPATIENT)
Dept: CARDIOLOGY | Facility: HOSPITAL | Age: 58
DRG: 291 | End: 2023-09-21
Payer: COMMERCIAL

## 2023-09-21 LAB
ANION GAP SERPL CALCULATED.3IONS-SCNC: 11 MMOL/L (ref 5–15)
BUN SERPL-MCNC: 18 MG/DL (ref 6–20)
BUN/CREAT SERPL: 10.1 (ref 7–25)
CALCIUM SPEC-SCNC: 9.1 MG/DL (ref 8.6–10.5)
CHLORIDE SERPL-SCNC: 98 MMOL/L (ref 98–107)
CO2 SERPL-SCNC: 27 MMOL/L (ref 22–29)
CREAT SERPL-MCNC: 1.78 MG/DL (ref 0.76–1.27)
EGFRCR SERPLBLD CKD-EPI 2021: 43.7 ML/MIN/1.73
GLUCOSE SERPL-MCNC: 97 MG/DL (ref 65–99)
POTASSIUM SERPL-SCNC: 3.8 MMOL/L (ref 3.5–5.2)
SODIUM SERPL-SCNC: 136 MMOL/L (ref 136–145)

## 2023-09-21 PROCEDURE — 80048 BASIC METABOLIC PNL TOTAL CA: CPT | Performed by: INTERNAL MEDICINE

## 2023-09-21 PROCEDURE — A9500 TC99M SESTAMIBI: HCPCS

## 2023-09-21 PROCEDURE — 25010000002 REGADENOSON 0.4 MG/5ML SOLUTION: Performed by: INTERNAL MEDICINE

## 2023-09-21 PROCEDURE — P9047 ALBUMIN (HUMAN), 25%, 50ML: HCPCS | Performed by: INTERNAL MEDICINE

## 2023-09-21 PROCEDURE — 25010000002 ALBUMIN HUMAN 25% PER 50 ML: Performed by: INTERNAL MEDICINE

## 2023-09-21 PROCEDURE — 0 TECHNETIUM SESTAMIBI

## 2023-09-21 PROCEDURE — 25010000002 FUROSEMIDE PER 20 MG: Performed by: INTERNAL MEDICINE

## 2023-09-21 PROCEDURE — 25010000002 DOBUTAMINE PER 250 MG: Performed by: INTERNAL MEDICINE

## 2023-09-21 RX ORDER — SODIUM CHLORIDE 0.9 % (FLUSH) 0.9 %
10 SYRINGE (ML) INJECTION ONCE
Status: CANCELLED | OUTPATIENT
Start: 2023-09-21

## 2023-09-21 RX ORDER — REGADENOSON 0.08 MG/ML
0.4 INJECTION, SOLUTION INTRAVENOUS ONCE
Status: COMPLETED | OUTPATIENT
Start: 2023-09-21 | End: 2023-09-21

## 2023-09-21 RX ORDER — REGADENOSON 0.08 MG/ML
0.4 INJECTION, SOLUTION INTRAVENOUS ONCE
Status: CANCELLED | OUTPATIENT
Start: 2023-09-21 | End: 2023-09-21

## 2023-09-21 RX ORDER — SODIUM CHLORIDE 9 MG/ML
10 INJECTION INTRAVENOUS EVERY 12 HOURS SCHEDULED
Status: DISCONTINUED | OUTPATIENT
Start: 2023-09-21 | End: 2023-09-24 | Stop reason: HOSPADM

## 2023-09-21 RX ADMIN — SACUBITRIL AND VALSARTAN 1 TABLET: 49; 51 TABLET, FILM COATED ORAL at 21:15

## 2023-09-21 RX ADMIN — ALBUMIN (HUMAN) 25 G: 0.25 INJECTION, SOLUTION INTRAVENOUS at 09:08

## 2023-09-21 RX ADMIN — Medication 10 ML: at 10:11

## 2023-09-21 RX ADMIN — DOBUTAMINE HYDROCHLORIDE 8 MCG/KG/MIN: 100 INJECTION INTRAVENOUS at 09:07

## 2023-09-21 RX ADMIN — DOBUTAMINE HYDROCHLORIDE 8 MCG/KG/MIN: 100 INJECTION INTRAVENOUS at 00:02

## 2023-09-21 RX ADMIN — FUROSEMIDE 40 MG: 40 INJECTION, SOLUTION INTRAMUSCULAR; INTRAVENOUS at 18:45

## 2023-09-21 RX ADMIN — CARVEDILOL 25 MG: 25 TABLET, FILM COATED ORAL at 12:01

## 2023-09-21 RX ADMIN — Medication 10 ML: at 21:14

## 2023-09-21 RX ADMIN — AMIODARONE HYDROCHLORIDE 200 MG: 200 TABLET ORAL at 12:01

## 2023-09-21 RX ADMIN — APIXABAN 5 MG: 5 TABLET, FILM COATED ORAL at 21:14

## 2023-09-21 RX ADMIN — REGADENOSON 0.4 MG: 0.08 INJECTION, SOLUTION INTRAVENOUS at 10:11

## 2023-09-21 RX ADMIN — ALBUMIN (HUMAN) 25 G: 0.25 INJECTION, SOLUTION INTRAVENOUS at 18:00

## 2023-09-21 RX ADMIN — DOBUTAMINE HYDROCHLORIDE 8 MCG/KG/MIN: 100 INJECTION INTRAVENOUS at 19:59

## 2023-09-21 RX ADMIN — SPIRONOLACTONE 25 MG: 25 TABLET ORAL at 12:02

## 2023-09-21 RX ADMIN — TECHNETIUM TC 99M SESTAMIBI 1 DOSE: 1 INJECTION INTRAVENOUS at 10:12

## 2023-09-21 RX ADMIN — ISOSORBIDE MONONITRATE 30 MG: 30 TABLET, EXTENDED RELEASE ORAL at 12:01

## 2023-09-21 RX ADMIN — DOBUTAMINE HYDROCHLORIDE 8 MCG/KG/MIN: 100 INJECTION INTRAVENOUS at 04:21

## 2023-09-21 RX ADMIN — DOBUTAMINE HYDROCHLORIDE 8 MCG/KG/MIN: 100 INJECTION INTRAVENOUS at 14:28

## 2023-09-21 RX ADMIN — ASPIRIN 81 MG: 81 TABLET, CHEWABLE ORAL at 12:02

## 2023-09-21 RX ADMIN — SACUBITRIL AND VALSARTAN 1 TABLET: 49; 51 TABLET, FILM COATED ORAL at 12:07

## 2023-09-21 RX ADMIN — AMIODARONE HYDROCHLORIDE 200 MG: 200 TABLET ORAL at 21:14

## 2023-09-21 RX ADMIN — APIXABAN 5 MG: 5 TABLET, FILM COATED ORAL at 12:02

## 2023-09-21 RX ADMIN — FUROSEMIDE 40 MG: 40 INJECTION, SOLUTION INTRAMUSCULAR; INTRAVENOUS at 11:53

## 2023-09-21 NOTE — PROGRESS NOTES
"[facility]  [address] [place] [address]. [address]  T - [phone]  F - [phone]     Progress Note          PATIENT  DEMOGRAPHICS   PATIENT NAME: Collin Teresa                      PHYSICIAN: ANNA MARIE Covington  : 1965  MRN: [id number]   LOS: 1 day    Patient Care Team:  Zakiya George DO as PCP - General (Family Medicine)  Subjective   SUBJECTIVE   Feeling OK today         Objective   OBJECTIVE   Vital Signs  Temp:  [97.3 °F (36.3 °C)-98.1 °F (36.7 °C)] 97.8 °F (36.6 °C)  Heart Rate:  [54-90] 60  Resp:  [16-18] 18  BP: ()/(57-79) 127/70    Flowsheet Rows      Flowsheet Row First Filed Value   Admission Height 177.8 cm (70\") Documented at 2023   Admission Weight 143 kg (316 lb) Documented at 20238             I/O last 3 completed shifts:  In: 540 [P.O.:540]  Out: 900 [Urine:900]    PHYSICAL EXAM    Physical Exam  Constitutional:       Appearance: He is well-developed.   HENT:      Head: Normocephalic and atraumatic.   Eyes:      Conjunctiva/sclera: Conjunctivae normal.      Pupils: Pupils are equal, round, and reactive to light.   Cardiovascular:      Rate and Rhythm: Normal rate and regular rhythm.   Pulmonary:      Effort: Pulmonary effort is normal.      Breath sounds: Normal breath sounds.   Abdominal:      Palpations: Abdomen is soft.   Musculoskeletal:      Cervical back: Neck supple.      Right lower leg: Edema present.      Left lower leg: Edema present.   Skin:     General: Skin is warm and dry.   Neurological:      Mental Status: He is alert and oriented to person, place, and time.   Psychiatric:         Mood and Affect: Mood normal.         Behavior: Behavior normal.       RESULTS   Results Review:    Results from last 7 days   Lab Units 23  1142 23  1057 23  0521 23  1520   SODIUM mmol/L 136 137 140 141   POTASSIUM mmol/L 3.8 3.6 4.0 4.6   CHLORIDE mmol/L 98 100 103 104   CO2 mmol/L 27.0 26.0 24.0 26.0   BUN mg/dL 18 " 23* 29* 33*   CREATININE mg/dL 1.78* 1.90* 1.90* 2.22*   CALCIUM mg/dL 9.1 9.0 9.4 9.6   BILIRUBIN mg/dL  --   --   --  1.1   ALK PHOS U/L  --   --   --  68   ALT (SGPT) U/L  --   --   --  22   AST (SGOT) U/L  --   --   --  21   GLUCOSE mg/dL 97 116* 89 105*       Estimated Creatinine Clearance: 64 mL/min (A) (by C-G formula based on SCr of 1.78 mg/dL (H)).    Results from last 7 days   Lab Units 09/20/23  1057 09/18/23  1924   MAGNESIUM mg/dL 1.9 2.3             Results from last 7 days   Lab Units 09/19/23  0521 09/18/23  0914 09/18/23  0320   WBC 10*3/mm3 7.35 6.5 6.74   HEMOGLOBIN g/dL 14.7 14.6 15.6   PLATELETS 10*3/mm3 192 208 198               Imaging Results (Last 24 Hours)       Procedure Component Value Units Date/Time    US Renal Bilateral [[id number]] Collected: 09/20/23 1429     Updated: 09/20/23 1634    Narrative:      INDICATION:  ckd 3 a amrit.    COMPARISON:  None relevant.    TECHNIQUE:  High-resolution grayscale and color Doppler ultrasound was performed of the  bilateral kidneys and the urinary bladder.    FINDINGS:  Left kidney contains a simple appearing cyst 6.4 cm; kidneys otherwise appear  normal.  Renal lengths measure 11.3 cm on the right and 11.7 cm on the left.    Urinary bladder appears normal.      Impression:      Left simple renal cyst.                   MEDICATIONS    albumin human, 25 g, Intravenous, BID  amiodarone, 200 mg, Oral, Q12H  apixaban, 5 mg, Oral, Q12H  aspirin, 81 mg, Oral, Daily  carvedilol, 25 mg, Oral, BID With Meals  furosemide, 40 mg, Intravenous, BID  isosorbide mononitrate, 30 mg, Oral, Daily  sacubitril-valsartan, 1 tablet, Oral, Q12H  Sodium Chloride (PF), 10 mL, Intravenous, Q12H  sodium chloride, 10 mL, Intravenous, Q12H  spironolactone, 25 mg, Oral, Daily      DOBUTamine, 8 mcg/kg/min, Last Rate: 8 mcg/kg/min (09/21/23 2100)        Assessment & Plan   ASSESSMENT / PLAN      Peripheral edema    AMRIT (acute kidney injury)    Elevated troponin    Pulmonary vascular  congestion    Heart failure, unspecified    1.  AMRIT on CKD- Baseline creatinine 1.3-1.4, creatinine up to greater than 2 on arrival and now 1.7.  Etiology of AMRIT possibly cardiorenal versus progression of CKD.  Agree with dobutamine and will continue Lasix 40 mg IV twice daily.  Keep Entresto and spironolactone for now.      -US shows left simple cyst    -Keep current meds     2.  CHF- Dr. Ricardo following, plan for Cardiolite stress test     3.  Hypertension     4.  DM2     5.  Dilated cardiomyopathy     6.  Chronic A-fib           This document has been electronically signed by ANNA MARIE Covington on September 21, 2023 14:34 CDT

## 2023-09-21 NOTE — PLAN OF CARE
Goal Outcome Evaluation:  Plan of Care Reviewed With: patient                   Educated patient on CHF/booklet given a well. Medication information given on dobutamine and albumin. He is eager to learn and make changes.

## 2023-09-21 NOTE — PROGRESS NOTES
Cardiology Progress Note     LOS: 0 days   Patient Care Team:  Zakiya George DO as PCP - General (Family Medicine)    Subjective:   Chart reviewed. Patient seen and examined. Patient symptoms of shortness of breath is improved some.  Patient lower extremity edema has improved.  Patient has not had any further recurrent symptoms or chest pain.  Patient underwent a myocardial perfusion Lexiscan Cardiolite stress test which had revealed an ejection fraction of 32% and a high risk study.    Intake/Output Summary (Last 24 hours) at 9/20/2023 2035  Last data filed at 9/20/2023 1700  Gross per 24 hour   Intake 300 ml   Output 400 ml   Net -100 ml       Physical Exam:   General Appearance:    Alert, oriented, cooperative, in no acute distress.   Head:    Normocephalic, atraumatic, without obvious abnormality   Eyes:             MIRI. Lids and lashes normal, conjunctivae and sclerae normal, no icterus, no pallor.   Ears:    Ears appear intact with no abnormalities noted.   Throat:   Mucous membranes pink and moist.   Neck:  Supple, trachea midline, no carotid bruit, no organomegaly or JVD.   Lungs:    Clear to auscultation and percussion.  Respirations regular, even and unlabored. No wheezes, rales, or rhonchi.    Heart:  Irregularly irregular S1 and S2 with a holosystolic murmur best heard at the apex no appreciable click.   Abdomen:    Soft, nontender, nondistended, no guarding, no rebound tenderness. Normal bowel sounds in all four quadrants, no masses, liver and spleen nonpalpable.    Genitalia:    Deferred.   Extremities:   Moves all extremities well, 2+ edema, no cyanosis, no       redness, no clubbing.   Pulses:   Pulses palpable and equal bilaterally.   Skin:   Moist and warm. No bleeding, bruising or rash.   Neurologic/Psychiatric:   Alert and oriented to person, place, and time.  Motor, power and tone in upper and lower extremities are grossly intact.  No focal neurological deficits. Normal cognitive function. No  psychomotor reaction or tangential thought. No depression, homicidal ideations and suicidal ideations.          Results Review:    Results from last 7 days   Lab Units 09/20/23  1057 09/19/23  0521 09/18/23  1520   SODIUM mmol/L 137   < > 141   POTASSIUM mmol/L 3.6   < > 4.6   CHLORIDE mmol/L 100   < > 104   CO2 mmol/L 26.0   < > 26.0   BUN mg/dL 23*   < > 33*   CREATININE mg/dL 1.90*   < > 2.22*   CALCIUM mg/dL 9.0   < > 9.6   BILIRUBIN mg/dL  --   --  1.1   ALK PHOS U/L  --   --  68   ALT (SGPT) U/L  --   --  22   AST (SGOT) U/L  --   --  21   GLUCOSE mg/dL 116*   < > 105*    < > = values in this interval not displayed.     Results from last 7 days   Lab Units 09/19/23  0925 09/19/23  0521 09/18/23  1924   HSTROP T ng/L 35* 35* 38*         Results from last 7 days   Lab Units 09/19/23  0521   WBC 10*3/mm3 7.35   HEMOGLOBIN g/dL 14.7   HEMATOCRIT % 45.9   PLATELETS 10*3/mm3 192         Results from last 7 days   Lab Units 09/20/23  1057   MAGNESIUM mg/dL 1.9     Results from last 7 days   Lab Units 09/18/23  0914   TRIGLYCERIDES MG/DL 60   HDL CHOL MG/DL 28*   LDL CHOL MG/DL 49               ECHO:  Results for orders placed during the hospital encounter of 09/18/23    Adult Transthoracic Echo Complete With Contrast if Necessary Per Protocol    Interpretation Summary    Left ventricular ejection fraction appears to be 21 - 25%.    The left ventricular cavity is borderline dilated.    The following left ventricular wall segments are hypokinetic: mid anterior, apical anterior, basal anterolateral, mid anterolateral, apical lateral, basal inferolateral, mid inferolateral, apical inferior, mid inferior, apical septal, basal inferoseptal, mid inferoseptal, apex hypokinetic, mid anteroseptal, basal anterior, basal inferior and basal inferoseptal.    Left ventricular diastolic function was normal.    The left atrial cavity is moderately dilated.    The right atrial cavity is mildly  dilated.    Estimated right ventricular  systolic pressure from tricuspid regurgitation is mildly elevated (35-45 mmHg).    Mild pulmonary hypertension is present.      ECG 12 Lead Rhythm Change   Preliminary Result   Test Reason : Rhythm Change   Blood Pressure :   */*   mmHG   Vent. Rate :  55 BPM     Atrial Rate : 277 BPM      P-R Int :   * ms          QRS Dur : 130 ms       QT Int : 534 ms       P-R-T Axes :   * -77 159 degrees      QTc Int : 510 ms      Atrial fibrillation with slow ventricular response   Left axis deviation   Nonspecific intraventricular block   Inferior infarct , age undetermined   Cannot rule out Anterior infarct , age undetermined   T wave abnormality, consider lateral ischemia   Abnormal ECG   When compared with ECG of 18-SEP-2023 19:25,   Atrial fibrillation has replaced Wide QRS rhythm      Referred By:            Confirmed By:       ECG 12 Lead   ED Interpretation   Kevin Mcdaniel MD     9/19/2023  5:55 AM   ECG 12 Lead         Date/Time: 9/18/2023 6:49 PM   Performed by: Kevin Mcdaniel MD   Authorized by: Kevin Mcdaniel MD    Interpreted by physician   Comparison: compared with previous ECG from 8/21/2021   Rhythm: atrial fibrillation   Comments: A-fib ventricular rate 81 bpm,  ms, QTc 47 ms, no ST    elevations.  No STEMI.  A-fib is new from previous EKG on file.  This is    my independent interpretation      ECG 12 Lead   ED Interpretation   Kevin Mcdaniel MD     9/19/2023  5:55 AM   ECG 12 Lead         Date/Time: 9/18/2023 7:25 PM   Performed by: Kevin Mcdaniel MD   Authorized by: Kevin Mcdaniel MD    Interpreted by physician   Comparison: compared with previous ECG from 9/18/2023   Rhythm: atrial fibrillation   Comments: A fib ventricular rate 80 bpm,  ms, QTc 512 ms, no ST    elevations.  No STEMI.  A-fib persists.  This is my independent    interpretation.      ECG 12 Lead Rhythm Change   Preliminary Result   Test Reason : Rhythm Change   Blood Pressure :   */*   mmHG    Vent. Rate :  80 BPM     Atrial Rate : 101 BPM      P-R Int :   * ms          QRS Dur : 130 ms       QT Int : 444 ms       P-R-T Axes :   * -73 110 degrees      QTc Int : 512 ms      Sinus tachycardia with AV dissociation and Wide QRS rhythm   Left axis deviation   Nonspecific intraventricular block   Inferior infarct , age undetermined   Anterolateral infarct , age undetermined   Abnormal ECG   When compared with ECG of 31-AUG-2021 09:47,   Wide QRS rhythm has replaced Sinus rhythm      Referred By:            Confirmed By:       SCANNED EKG   Final Result      SCANNED EKG   Final Result      SCANNED - TELEMETRY     Final Result      SCANNED - TELEMETRY     Final Result      SCANNED - TELEMETRY     Final Result      SCANNED - TELEMETRY     Final Result      ECG 12 Lead Dyspnea    (Results Pending)        Medication Review:   Current Facility-Administered Medications   Medication Dose Route Frequency Provider Last Rate Last Admin    acetaminophen (TYLENOL) tablet 650 mg  650 mg Oral Q4H PRN Dickson Nichols MD        Or    acetaminophen (TYLENOL) 160 MG/5ML oral solution 650 mg  650 mg Oral Q4H PRN Dickson Nichols MD        Or    acetaminophen (TYLENOL) suppository 650 mg  650 mg Rectal Q4H PRN Dickson Nichols MD        albumin human 25 % IV SOLN 25 g  25 g Intravenous BID Juma Ricardo MD   25 g at 09/20/23 1619    albuterol (PROVENTIL) nebulizer solution 0.083% 2.5 mg/3mL  2.5 mg Nebulization Q4H PRN Dickson Nichols MD        amiodarone (PACERONE) tablet 200 mg  200 mg Oral Q12H Dickson Nichols MD   200 mg at 09/20/23 0819    apixaban (ELIQUIS) tablet 5 mg  5 mg Oral Q12H Dickson Nichols MD   5 mg at 09/20/23 0819    aspirin chewable tablet 81 mg  81 mg Oral Daily Dickson Nichols MD   81 mg at 09/20/23 0819    carvedilol (COREG) tablet 25 mg  25 mg Oral BID With Meals Dickson Nichols MD   25 mg at 09/20/23 1714    DOBUTamine (DOBUTREX) 1 mg/mL infusion  8  mcg/kg/min Intravenous Continuous Juma Ricardo MD 67.7 mL/hr at 09/20/23 1943 8 mcg/kg/min at 09/20/23 1943    furosemide (LASIX) injection 40 mg  40 mg Intravenous BID Juma Ricardo MD   40 mg at 09/20/23 1714    isosorbide mononitrate (IMDUR) 24 hr tablet 30 mg  30 mg Oral Daily Dcikson Nichols MD   30 mg at 09/20/23 0819    melatonin tablet 5 mg  5 mg Oral Nightly PRN Dickson Nichols MD        morphine injection 2 mg  2 mg Intravenous Q4H PRN Dickson Nichols MD        And    naloxone (NARCAN) injection 0.4 mg  0.4 mg Intravenous Q5 Min PRN Dickson Nichols MD        nitroglycerin (NITROSTAT) SL tablet 0.4 mg  0.4 mg Sublingual Q5 Min PRN Dickson Nichols MD        ondansetron (ZOFRAN) tablet 4 mg  4 mg Oral Q6H PRN Dickson Nichols MD        Or    ondansetron (ZOFRAN) injection 4 mg  4 mg Intravenous Q6H PRN Dickson Nichols MD        sacubitril-valsartan (ENTRESTO) 49-51 MG tablet 1 tablet  1 tablet Oral Q12H Dickson Nichols MD   1 tablet at 09/20/23 0819    sodium chloride 0.9 % flush 10 mL  10 mL Intravenous Q12H Dickson Nichols MD   10 mL at 09/20/23 0820    sodium chloride 0.9 % flush 10 mL  10 mL Intravenous PRN Dickson Nichols MD        sodium chloride 0.9 % infusion 40 mL  40 mL Intravenous PRN Dickson Nichols MD        spironolactone (ALDACTONE) tablet 25 mg  25 mg Oral Daily Dickson Nichols MD   25 mg at 09/20/23 0819       Assessment and Plan:      Peripheral edema    AMRIT (acute kidney injury)    Elevated troponin    Pulmonary vascular congestion    Heart failure, unspecified  1.  Positive troponin.  Patient had symptoms of shortness of breath with positive troponin on admission.  Patient had undergone previous coronary angiogram which did not reveal of any evidence of any obstructive epicardial coronary artery disease.  Patient underwent a myocardial perfusion Lexiscan Cardiolite stress test which was high risk  study with moderate size infarct in the anterior inferior and apex with mild bony-infarct ischemia.  Patient had an ejection fraction of 32%.  Patient is currently not having any symptoms of substernal chest pain.  After prolonged discussion with the patient patient at the present time would not be subjected to any invasive evaluation including coronary angiogram and would be treated medically rather than invasive evaluation with a coronary angiogram.    2.  Acute on chronic systolic heart failure.  Patient has previous diagnosis of nonischemic cardiomyopathy.  Had new onset of atrial fibrillation which could have made the patient's symptom complex was with the loss of atrial kick.  Patient would be continued on IV dobutamine with aggressive diuresis with Aldactone Lasix.  Patient would receive IV albumin.  We will continue with diuresis and will follow the renal function.  Patient will be continued on the Entresto Coreg Lasix Aldactone.    3.  Atrial fibrillation.  Patient ventricular rate is controlled.  Patient will be continued on the amiodarone and anticoagulation with Eliquis.  Patient denies any symptoms of palpitation.    4.  Chronic anticoagulation with Eliquis.  Patient has not had any hemoptysis hematuria Beiter blood per rectum.    5.  Obesity with a body mass index of 40.  Patient has been counseled on weight reduction lifestyle modification and dietary restriction.  Patient has been explained the risk associated with obesity and the occurrence and progression of atherosclerotic coronary artery disease and peripheral vascular disease.    6.  Chronic kidney disease.  Patient creatinine is 1.9.  Patient will be evaluated by nephrology.    The above plan of management were discussed with the patient family and the nursing staff.                 Electronically signed by Juma Ricardo MD, 09/20/23, 8:35 PM CDT.      Time: Time spent on face-to-face interaction is 20 minutes    Dictated utilizing Dragon  dictation.

## 2023-09-22 LAB
ANION GAP SERPL CALCULATED.3IONS-SCNC: 11 MMOL/L (ref 5–15)
BUN SERPL-MCNC: 17 MG/DL (ref 6–20)
BUN/CREAT SERPL: 9.8 (ref 7–25)
CALCIUM SPEC-SCNC: 9.6 MG/DL (ref 8.6–10.5)
CHLORIDE SERPL-SCNC: 99 MMOL/L (ref 98–107)
CO2 SERPL-SCNC: 27 MMOL/L (ref 22–29)
CREAT SERPL-MCNC: 1.73 MG/DL (ref 0.76–1.27)
EGFRCR SERPLBLD CKD-EPI 2021: 45.2 ML/MIN/1.73
GLUCOSE SERPL-MCNC: 99 MG/DL (ref 65–99)
POTASSIUM SERPL-SCNC: 4 MMOL/L (ref 3.5–5.2)
SODIUM SERPL-SCNC: 137 MMOL/L (ref 136–145)

## 2023-09-22 PROCEDURE — 25010000002 FUROSEMIDE PER 20 MG: Performed by: INTERNAL MEDICINE

## 2023-09-22 PROCEDURE — 25010000002 DOBUTAMINE PER 250 MG: Performed by: INTERNAL MEDICINE

## 2023-09-22 PROCEDURE — P9047 ALBUMIN (HUMAN), 25%, 50ML: HCPCS | Performed by: INTERNAL MEDICINE

## 2023-09-22 PROCEDURE — 80048 BASIC METABOLIC PNL TOTAL CA: CPT | Performed by: NURSE PRACTITIONER

## 2023-09-22 PROCEDURE — 25010000002 ALBUMIN HUMAN 25% PER 50 ML: Performed by: INTERNAL MEDICINE

## 2023-09-22 RX ADMIN — AMIODARONE HYDROCHLORIDE 200 MG: 200 TABLET ORAL at 08:40

## 2023-09-22 RX ADMIN — ISOSORBIDE MONONITRATE 30 MG: 30 TABLET, EXTENDED RELEASE ORAL at 08:40

## 2023-09-22 RX ADMIN — ALBUMIN (HUMAN) 25 G: 0.25 INJECTION, SOLUTION INTRAVENOUS at 17:18

## 2023-09-22 RX ADMIN — CARVEDILOL 25 MG: 25 TABLET, FILM COATED ORAL at 08:40

## 2023-09-22 RX ADMIN — FUROSEMIDE 40 MG: 40 INJECTION, SOLUTION INTRAMUSCULAR; INTRAVENOUS at 08:41

## 2023-09-22 RX ADMIN — ALBUMIN (HUMAN) 25 G: 0.25 INJECTION, SOLUTION INTRAVENOUS at 08:45

## 2023-09-22 RX ADMIN — SACUBITRIL AND VALSARTAN 1 TABLET: 49; 51 TABLET, FILM COATED ORAL at 20:31

## 2023-09-22 RX ADMIN — DOBUTAMINE HYDROCHLORIDE 8 MCG/KG/MIN: 100 INJECTION INTRAVENOUS at 05:40

## 2023-09-22 RX ADMIN — DOBUTAMINE HYDROCHLORIDE 8 MCG/KG/MIN: 100 INJECTION INTRAVENOUS at 20:29

## 2023-09-22 RX ADMIN — DOBUTAMINE HYDROCHLORIDE 8 MCG/KG/MIN: 100 INJECTION INTRAVENOUS at 11:21

## 2023-09-22 RX ADMIN — ASPIRIN 81 MG: 81 TABLET, CHEWABLE ORAL at 08:40

## 2023-09-22 RX ADMIN — FUROSEMIDE 40 MG: 40 INJECTION, SOLUTION INTRAMUSCULAR; INTRAVENOUS at 17:18

## 2023-09-22 RX ADMIN — DOBUTAMINE HYDROCHLORIDE 8 MCG/KG/MIN: 100 INJECTION INTRAVENOUS at 16:00

## 2023-09-22 RX ADMIN — APIXABAN 5 MG: 5 TABLET, FILM COATED ORAL at 20:30

## 2023-09-22 RX ADMIN — DOBUTAMINE HYDROCHLORIDE 8 MCG/KG/MIN: 100 INJECTION INTRAVENOUS at 00:51

## 2023-09-22 RX ADMIN — SPIRONOLACTONE 25 MG: 25 TABLET ORAL at 08:40

## 2023-09-22 RX ADMIN — SODIUM CHLORIDE 10 ML: 9 INJECTION, SOLUTION INTRAMUSCULAR; INTRAVENOUS; SUBCUTANEOUS at 20:34

## 2023-09-22 RX ADMIN — SACUBITRIL AND VALSARTAN 1 TABLET: 49; 51 TABLET, FILM COATED ORAL at 08:40

## 2023-09-22 RX ADMIN — APIXABAN 5 MG: 5 TABLET, FILM COATED ORAL at 08:40

## 2023-09-22 RX ADMIN — AMIODARONE HYDROCHLORIDE 200 MG: 200 TABLET ORAL at 20:30

## 2023-09-22 NOTE — PROGRESS NOTES
Cardiology Progress Note     LOS: 2 days   Patient Care Team:  Zakiya George DO as PCP - General (Family Medicine)    Subjective:  Chart reviewed. Patient seen and examined. Patient symptoms of shortness of breath is improved.  Patient has lost weight.  Patient is currently getting IV albumin.  Patient is tolerating Entresto.    Objective:  Temp:  [97.4 °F (36.3 °C)-98 °F (36.7 °C)] 97.7 °F (36.5 °C)  Heart Rate:  [45-70] 62  Resp:  [16-18] 18  BP: (101-119)/(54-74) 101/64    Intake/Output Summary (Last 24 hours) at 9/22/2023 1235  Last data filed at 9/22/2023 0540  Gross per 24 hour   Intake 480 ml   Output 1000 ml   Net -520 ml       Physical Exam:   General Appearance:    Alert, oriented, cooperative, in no acute distress.   Head:    Normocephalic, atraumatic, without obvious abnormality   Eyes:             MIRI. Lids and lashes normal, conjunctivae and sclerae normal, no icterus, no pallor.   Ears:    Ears appear intact with no abnormalities noted.   Throat:   Mucous membranes pink and moist.   Neck:  Supple, trachea midline, no carotid bruit, no organomegaly or JVD.   Lungs:    Clear to auscultation and percussion.  Respirations regular, even and unlabored. No wheezes, rales, or rhonchi.    Heart:  Irregularly irregular S1 and S2 with a holosystolic murmur best heard at the apex no appreciable click.   Abdomen:    Soft, nontender, nondistended, no guarding, no rebound tenderness. Normal bowel sounds in all four quadrants, no masses, liver and spleen nonpalpable.    Genitalia:    Deferred.   Extremities:   Moves all extremities well, 2+ edema, no cyanosis, no       redness, no clubbing.   Pulses:   Pulses palpable and equal bilaterally.   Skin:   Moist and warm. No bleeding, bruising or rash.   Neurologic/Psychiatric:   Alert and oriented to person, place, and time.  Motor, power and tone in upper and lower extremities are grossly intact.  No focal neurological deficits. Normal cognitive function. No  psychomotor reaction or tangential thought. No depression, homicidal ideations and suicidal ideations.          Results Review:    Results from last 7 days   Lab Units 09/22/23  0827 09/19/23  0521 09/18/23  1520   SODIUM mmol/L 137   < > 141   POTASSIUM mmol/L 4.0   < > 4.6   CHLORIDE mmol/L 99   < > 104   CO2 mmol/L 27.0   < > 26.0   BUN mg/dL 17   < > 33*   CREATININE mg/dL 1.73*   < > 2.22*   CALCIUM mg/dL 9.6   < > 9.6   BILIRUBIN mg/dL  --   --  1.1   ALK PHOS U/L  --   --  68   ALT (SGPT) U/L  --   --  22   AST (SGOT) U/L  --   --  21   GLUCOSE mg/dL 99   < > 105*    < > = values in this interval not displayed.     Results from last 7 days   Lab Units 09/19/23  0925 09/19/23  0521 09/18/23  1924   HSTROP T ng/L 35* 35* 38*         Results from last 7 days   Lab Units 09/19/23  0521   WBC 10*3/mm3 7.35   HEMOGLOBIN g/dL 14.7   HEMATOCRIT % 45.9   PLATELETS 10*3/mm3 192         Results from last 7 days   Lab Units 09/20/23  1057   MAGNESIUM mg/dL 1.9     Results from last 7 days   Lab Units 09/18/23  0914   TRIGLYCERIDES MG/DL 60   HDL CHOL MG/DL 28*   LDL CHOL MG/DL 49               ECHO:  Results for orders placed during the hospital encounter of 09/18/23    Adult Transthoracic Echo Complete With Contrast if Necessary Per Protocol    Interpretation Summary    Left ventricular ejection fraction appears to be 21 - 25%.    The left ventricular cavity is borderline dilated.    The following left ventricular wall segments are hypokinetic: mid anterior, apical anterior, basal anterolateral, mid anterolateral, apical lateral, basal inferolateral, mid inferolateral, apical inferior, mid inferior, apical septal, basal inferoseptal, mid inferoseptal, apex hypokinetic, mid anteroseptal, basal anterior, basal inferior and basal inferoseptal.    Left ventricular diastolic function was normal.    The left atrial cavity is moderately dilated.    The right atrial cavity is mildly  dilated.    Estimated right ventricular  systolic pressure from tricuspid regurgitation is mildly elevated (35-45 mmHg).    Mild pulmonary hypertension is present.      ECG 12 Lead Rhythm Change   Preliminary Result   Test Reason : Rhythm Change   Blood Pressure :   */*   mmHG   Vent. Rate :  55 BPM     Atrial Rate : 277 BPM      P-R Int :   * ms          QRS Dur : 130 ms       QT Int : 534 ms       P-R-T Axes :   * -77 159 degrees      QTc Int : 510 ms      Atrial fibrillation with slow ventricular response   Left axis deviation   Nonspecific intraventricular block   Inferior infarct , age undetermined   Cannot rule out Anterior infarct , age undetermined   T wave abnormality, consider lateral ischemia   Abnormal ECG   When compared with ECG of 18-SEP-2023 19:25,   Atrial fibrillation has replaced Wide QRS rhythm      Referred By:            Confirmed By:       ECG 12 Lead   ED Interpretation   Kevin Mcdaniel MD     9/19/2023  5:55 AM   ECG 12 Lead         Date/Time: 9/18/2023 6:49 PM   Performed by: Kevin Mcdaniel MD   Authorized by: Kevin Mcdaniel MD    Interpreted by physician   Comparison: compared with previous ECG from 8/21/2021   Rhythm: atrial fibrillation   Comments: A-fib ventricular rate 81 bpm,  ms, QTc 47 ms, no ST    elevations.  No STEMI.  A-fib is new from previous EKG on file.  This is    my independent interpretation      ECG 12 Lead   ED Interpretation   Kevin Mcdaniel MD     9/19/2023  5:55 AM   ECG 12 Lead         Date/Time: 9/18/2023 7:25 PM   Performed by: Kevin Mcdaniel MD   Authorized by: Kevin Mcdaniel MD    Interpreted by physician   Comparison: compared with previous ECG from 9/18/2023   Rhythm: atrial fibrillation   Comments: A fib ventricular rate 80 bpm,  ms, QTc 512 ms, no ST    elevations.  No STEMI.  A-fib persists.  This is my independent    interpretation.      ECG 12 Lead Rhythm Change   Preliminary Result   Test Reason : Rhythm Change   Blood Pressure :   */*   mmHG    Vent. Rate :  80 BPM     Atrial Rate : 101 BPM      P-R Int :   * ms          QRS Dur : 130 ms       QT Int : 444 ms       P-R-T Axes :   * -73 110 degrees      QTc Int : 512 ms      Sinus tachycardia with AV dissociation and Wide QRS rhythm   Left axis deviation   Nonspecific intraventricular block   Inferior infarct , age undetermined   Anterolateral infarct , age undetermined   Abnormal ECG   When compared with ECG of 31-AUG-2021 09:47,   Wide QRS rhythm has replaced Sinus rhythm      Referred By:            Confirmed By:       SCANNED EKG   Final Result      SCANNED EKG   Final Result      SCANNED - TELEMETRY     Final Result      SCANNED - TELEMETRY     Final Result      SCANNED - TELEMETRY     Final Result      SCANNED - TELEMETRY     Final Result      SCANNED - TELEMETRY     Final Result      SCANNED - TELEMETRY     Final Result      SCANNED - TELEMETRY     Final Result      SCANNED - TELEMETRY     Final Result      ECG 12 Lead Dyspnea    (Results Pending)        Medication Review:   Current Facility-Administered Medications   Medication Dose Route Frequency Provider Last Rate Last Admin    acetaminophen (TYLENOL) tablet 650 mg  650 mg Oral Q4H PRN Dickson Nichols MD        Or    acetaminophen (TYLENOL) 160 MG/5ML oral solution 650 mg  650 mg Oral Q4H PRN Dickson Nichols MD        Or    acetaminophen (TYLENOL) suppository 650 mg  650 mg Rectal Q4H PRN Dickson Nichols MD        albumin human 25 % IV SOLN 25 g  25 g Intravenous BID Juma Ricardo MD   25 g at 09/22/23 0845    albuterol (PROVENTIL) nebulizer solution 0.083% 2.5 mg/3mL  2.5 mg Nebulization Q4H PRN Dickson Nichols MD        amiodarone (PACERONE) tablet 200 mg  200 mg Oral Q12H Dickson Nichols MD   200 mg at 09/22/23 0840    apixaban (ELIQUIS) tablet 5 mg  5 mg Oral Q12H Dickson Nichols MD   5 mg at 09/22/23 0840    aspirin chewable tablet 81 mg  81 mg Oral Daily Dickson Nichols MD   81 mg at  09/22/23 0840    carvedilol (COREG) tablet 25 mg  25 mg Oral BID With Meals Dickson Nichols MD   25 mg at 09/22/23 0840    DOBUTamine (DOBUTREX) 1 mg/mL infusion  8 mcg/kg/min Intravenous Continuous Juma Ricardo MD 67.7 mL/hr at 09/22/23 1121 8 mcg/kg/min at 09/22/23 1121    furosemide (LASIX) injection 40 mg  40 mg Intravenous BID Juma Ricardo MD   40 mg at 09/22/23 0841    isosorbide mononitrate (IMDUR) 24 hr tablet 30 mg  30 mg Oral Daily Dickson Nichols MD   30 mg at 09/22/23 0840    melatonin tablet 5 mg  5 mg Oral Nightly PRN Dickson Nichols MD        nitroglycerin (NITROSTAT) SL tablet 0.4 mg  0.4 mg Sublingual Q5 Min PRN Dickson Nichols MD        ondansetron (ZOFRAN) tablet 4 mg  4 mg Oral Q6H PRN Dickson Nichols MD        Or    ondansetron (ZOFRAN) injection 4 mg  4 mg Intravenous Q6H PRN Dickson Nichols MD        sacubitril-valsartan (ENTRESTO) 49-51 MG tablet 1 tablet  1 tablet Oral Q12H Dickson Nichols MD   1 tablet at 09/22/23 0840    Sodium Chloride (PF) 0.9 % 10 mL  10 mL Intravenous Q12H Juma Ricardo MD        sodium chloride 0.9 % flush 10 mL  10 mL Intravenous Q12H Dickson Nichols MD   10 mL at 09/21/23 2114    sodium chloride 0.9 % flush 10 mL  10 mL Intravenous PRN Dickson Nichols MD   10 mL at 09/21/23 1011    sodium chloride 0.9 % infusion 40 mL  40 mL Intravenous PRN Dickson Nichols MD        spironolactone (ALDACTONE) tablet 25 mg  25 mg Oral Daily Dickson Nichols MD   25 mg at 09/22/23 0840       Assessment and Plan:      Peripheral edema    AMRIT (acute kidney injury)    Elevated troponin    Pulmonary vascular congestion    Heart failure, unspecified  1.  Acute on chronic systolic heart failure.  Patient had positive troponin the patient denies any symptoms of chest pain.  Patient had undergone previous coronary angiogram which did not reveal of any evidence of any obstructive epicardial coronary artery  disease.  Patient has been diuresing with the Lasix and albumin.  We will continue with the Aldactone and Entresto Coreg and IV dobutamine.  Patient has not had any evidence of any arrhythmia.    2.  Positive troponin.  Patient had undergone a myocardial perfusion Lexiscan Cardiolite stress test which was a high risk study with bony-infarct ischemia with an ejection fraction of 32%.  Clinically at the present time patient is not having any symptoms of substernal chest pain and patient would not be subjected to any invasive evaluation at the present time.    3.  Atrial fibrillation.  Patient has not complained of having symptoms of palpitation.  Patient is tolerating amiodarone and Eliquis.  I have discussed with the patient consideration for radiofrequency ablation versus electrical cardioversion after the patient received 8 to 12 weeks of anticoagulation with Eliquis.    4.  Obesity with a body mass index of 40.  Patient has been counseled on weight reduction lifestyle modification and dietary restriction.  Patient has been explained the risk associated with obesity and the occurrence and progression of atherosclerotic coronary artery disease and peripheral vascular disease.    5.  Arterial hypertension.  Patient blood pressure is much better controlled on the Entresto along with the carvedilol with aggressive diuresis.  Patient denies any symptoms of headache.    6.  Chronic anticoagulation with Eliquis.  Patient has atrial fibrillation and has been anticoagulated with Eliquis.  Patient denies any episodes of any bleeding diathesis.    7.  Disposition.  Patient is eager to go home when clinically stable.    Above plan of management were discussed with the patient.        Electronically signed by Juma Ricardo MD, 09/22/23, 12:35 PM CDT.      Time: Time spent on face-to-face interaction 20 minutes    Dictated utilizing Dragon dictation.

## 2023-09-22 NOTE — PROGRESS NOTES
[facility]  INPATIENT PROGRESS NOTE    Length of Stay: 2  Date of Admission: 9/18/2023  Primary Care Physician: Zakiya George,     Subjective   (S) Admitted for chest pain and shortness of breath; diagnosed with congestive heart failure  Today, asymptomatic; happy that he is losing weight;     Review of Systems   All other systems reviewed and are negative.     All pertinent negatives and positives are as above. All other systems have been reviewed and are negative unless otherwise stated.     Prior to Admission medications    Medication Sig Start Date End Date Taking? Authorizing Provider   amiodarone (PACERONE) 200 MG tablet Take 1 tablet by mouth Every 12 (Twelve) Hours. 8/15/23  Yes Yaneth Mahmood MD   carvedilol (COREG) 25 MG tablet TAKE ONE TABLET BY MOUTH TWO TIMES A DAY WITH MEALS 6/20/23  Yes Norma Olivarez APRN   Corlanor 5 MG tablet tablet TAKE ONE TABLET BY MOUTH TWO TIMES A DAY WITH MEALS 3/23/22  Yes Noemi Key APRN   Eliquis 5 MG tablet tablet Take 1 tablet by mouth Every 12 (Twelve) Hours. 8/15/23  Yes Yaneth Mahmood MD   furosemide (LASIX) 40 MG tablet Take 1 tablet by mouth Daily As Needed (weight gain of 3lbs overnight or 5lbs in a week.). 1/19/22  Yes Noemi Key APRN   hydrALAZINE (APRESOLINE) 25 MG tablet Take 1 tablet by mouth 3 (Three) Times a Day. 8/31/21  Yes Noemi Key APRN   isosorbide mononitrate (IMDUR) 30 MG 24 hr tablet TAKE 1 TABLET BY MOUTH DAILY. 10/4/22  Yes Norma Olivarez APRN   spironolactone (ALDACTONE) 25 MG tablet Take 1 tablet by mouth Daily. 8/23/22  Yes Norma Olivarez APRN   albuterol sulfate  (90 Base) MCG/ACT inhaler Inhale 2 puffs Every 4 (Four) Hours As Needed for Wheezing or Shortness of Air. 1/20/22   Noemi Key APRN   aspirin 81 MG chewable tablet Chew 1 tablet Daily.    ProviderYaneth MD   brompheniramine-pseudoephedrine-DM 30-2-10  MG/5ML syrup Take 2.5 mL by mouth 4 (Four) Times a Day As Needed for Congestion or Allergies. 1/20/22   Noemi Key APRN   magnesium oxide (MAG-OX) 400 MG tablet Take 1 tablet by mouth Daily.    Provider, MD Yaneth       albumin human, 25 g, Intravenous, BID  amiodarone, 200 mg, Oral, Q12H  apixaban, 5 mg, Oral, Q12H  aspirin, 81 mg, Oral, Daily  carvedilol, 25 mg, Oral, BID With Meals  furosemide, 40 mg, Intravenous, BID  isosorbide mononitrate, 30 mg, Oral, Daily  sacubitril-valsartan, 1 tablet, Oral, Q12H  Sodium Chloride (PF), 10 mL, Intravenous, Q12H  sodium chloride, 10 mL, Intravenous, Q12H  spironolactone, 25 mg, Oral, Daily      DOBUTamine, 8 mcg/kg/min, Last Rate: 8 mcg/kg/min (09/22/23 1121)        Objective    Temp:  [97.4 °F (36.3 °C)-97.9 °F (36.6 °C)] 97.7 °F (36.5 °C)  Heart Rate:  [45-70] 62  Resp:  [16-18] 18  BP: (101-119)/(54-74) 101/64    Physical Exam  Vitals reviewed.   Constitutional:       Appearance: He is obese.   HENT:      Head: Normocephalic.      Nose: Nose normal.      Mouth/Throat:      Mouth: Mucous membranes are moist.   Eyes:      Extraocular Movements: Extraocular movements intact.   Cardiovascular:      Rate and Rhythm: Normal rate. Rhythm irregular.      Heart sounds: Normal heart sounds.   Pulmonary:      Breath sounds: Rales present.   Abdominal:      General: Bowel sounds are normal.      Palpations: Abdomen is soft.   Musculoskeletal:         General: Normal range of motion.      Cervical back: Neck supple.      Right lower leg: Edema present.      Left lower leg: Edema present.   Neurological:      General: No focal deficit present.      Mental Status: He is alert and oriented to person, place, and time.   Psychiatric:         Behavior: Behavior normal.     Results Review:  I have reviewed the labs, radiology results, and diagnostic studies.    Laboratory Data:   Results from last 7 days   Lab Units 09/22/23  0827 09/21/23  1142 09/20/23  1057 09/19/23  0521  09/18/23  1520   SODIUM mmol/L 137 136 137   < > 141   POTASSIUM mmol/L 4.0 3.8 3.6   < > 4.6   CHLORIDE mmol/L 99 98 100   < > 104   CO2 mmol/L 27.0 27.0 26.0   < > 26.0   BUN mg/dL 17 18 23*   < > 33*   CREATININE mg/dL 1.73* 1.78* 1.90*   < > 2.22*   GLUCOSE mg/dL 99 97 116*   < > 105*   CALCIUM mg/dL 9.6 9.1 9.0   < > 9.6   BILIRUBIN mg/dL  --   --   --   --  1.1   ALK PHOS U/L  --   --   --   --  68   ALT (SGPT) U/L  --   --   --   --  22   AST (SGOT) U/L  --   --   --   --  21   ANION GAP mmol/L 11.0 11.0 11.0   < > 11.0    < > = values in this interval not displayed.       Estimated Creatinine Clearance: 65.3 mL/min (A) (by C-G formula based on SCr of 1.73 mg/dL (H)).  Results from last 7 days   Lab Units 09/20/23  1057 09/18/23  1924   MAGNESIUM mg/dL 1.9 2.3           Results from last 7 days   Lab Units 09/19/23  0521 09/18/23  0914 09/18/23  0320   WBC 10*3/mm3 7.35 6.5 6.74   HEMOGLOBIN g/dL 14.7 14.6 15.6   HEMATOCRIT % 45.9 47.9 48.4   PLATELETS 10*3/mm3 192 208 198             Culture Data:   No results found for: BLOODCX  No results found for: URINECX  No results found for: RESPCX  No results found for: WOUNDCX  No results found for: STOOLCX  No components found for: BODYFLD    Radiology Data:   Imaging Results (Last 24 Hours)       ** No results found for the last 24 hours. **            I have reviewed the patient's current medications.     Assessment/Plan     Chest pain      In the setting of dilated cardiomyopathy     No chest pain at this time; on dobutamine drip for one more day     Nuclear stress test interpreted by Dr Ricardo; due to his kidney function, no a good candidate for C    Acute on chronic systolic CHF     In the setting of AMRIT on CKD     Blood pressure has been stalbe     Continue with lasix and albumin and dobutamine     Mild improvement yesterday; monitor    AMRIT on CKD     Likely due to above in the setting of diabetes and hypertension     Keeps improving a little with current  treament     Nephrologist consulted     Chronic medical problems     Essential hypertension     Chronic a fib      Type II DM    Medical Decision Making  Number and Complexity of problems: two major complexes  Differential Diagnosis: ACS    Conditions and Status:        Condition is improving.     MDM Data  External documents reviewed: previous medical records  My EKG interpretation: n/a  My plain film interpretation: vascular congestion     Discussed with: RN     Treatment Plan  See above    Care Planning  Shared decision making: his wife   Code status and discussions: full code    Disposition  Social Determinants of Health that impact treatment or disposition: none  I expect the patient to be discharge: in progress    I confirmed that the patient's Advance Care Plan is present, code status is documented, or surrogate decision maker is listed in the patient's medical record.     Rachid Garza MD

## 2023-09-22 NOTE — PROGRESS NOTES
Cardiology Progress Note     LOS: 1 day   Patient Care Team:  Zakiya George DO as PCP - General (Family Medicine)    Subjective:    Chart reviewed. Patient seen and examined. Patient symptoms of shortness of breath is improved.  Patient continued to diurese.  Patient lower extremity edema has improved.  Patient denies any symptoms of chest pain.  Patient's creatinine is 1.78.  Patient's hemoglobin has remained stable 14.7.      Objective:  Temp:  [97.6 °F (36.4 °C)-98.1 °F (36.7 °C)] 97.9 °F (36.6 °C)  Heart Rate:  [50-90] 59  Resp:  [16-18] 16  BP: ()/(57-79) 105/70    Intake/Output Summary (Last 24 hours) at 9/21/2023 2140  Last data filed at 9/21/2023 2124  Gross per 24 hour   Intake 480 ml   Output 1200 ml   Net -720 ml       Physical Exam:   General Appearance:    Alert, oriented, cooperative, in no acute distress.   Head:    Normocephalic, atraumatic, without obvious abnormality   Eyes:             MIRI. Lids and lashes normal, conjunctivae and sclerae normal, no icterus, no pallor.   Ears:    Ears appear intact with no abnormalities noted.   Throat:   Mucous membranes pink and moist.   Neck:  Supple, trachea midline, no carotid bruit, no organomegaly or JVD.   Lungs:    Clear to auscultation and percussion.  Respirations regular, even and unlabored. No wheezes, rales, or rhonchi.    Heart:  Irregularly irregular S1 and S2 with a holosystolic murmur best heard at the apex.   Abdomen:    Soft, nontender, nondistended, no guarding, no rebound tenderness. Normal bowel sounds in all four quadrants, no masses, liver and spleen nonpalpable.    Genitalia:    Deferred.   Extremities:   Moves all extremities well, no edema, no cyanosis, no       redness, no clubbing.   Pulses:   Pulses palpable and equal bilaterally.   Skin:   Moist and warm. No bleeding, bruising or rash.   Neurologic/Psychiatric:   Alert and oriented to person, place, and time.  Motor, power and tone in upper and lower extremities are grossly  intact.  No focal neurological deficits. Normal cognitive function. No psychomotor reaction or tangential thought. No depression, homicidal ideations and suicidal ideations.          Results Review:    Results from last 7 days   Lab Units 09/21/23  1142 09/19/23  0521 09/18/23  1520   SODIUM mmol/L 136   < > 141   POTASSIUM mmol/L 3.8   < > 4.6   CHLORIDE mmol/L 98   < > 104   CO2 mmol/L 27.0   < > 26.0   BUN mg/dL 18   < > 33*   CREATININE mg/dL 1.78*   < > 2.22*   CALCIUM mg/dL 9.1   < > 9.6   BILIRUBIN mg/dL  --   --  1.1   ALK PHOS U/L  --   --  68   ALT (SGPT) U/L  --   --  22   AST (SGOT) U/L  --   --  21   GLUCOSE mg/dL 97   < > 105*    < > = values in this interval not displayed.     Results from last 7 days   Lab Units 09/19/23  0925 09/19/23  0521 09/18/23  1924   HSTROP T ng/L 35* 35* 38*         Results from last 7 days   Lab Units 09/19/23  0521   WBC 10*3/mm3 7.35   HEMOGLOBIN g/dL 14.7   HEMATOCRIT % 45.9   PLATELETS 10*3/mm3 192         Results from last 7 days   Lab Units 09/20/23  1057   MAGNESIUM mg/dL 1.9     Results from last 7 days   Lab Units 09/18/23  0914   TRIGLYCERIDES MG/DL 60   HDL CHOL MG/DL 28*   LDL CHOL MG/DL 49               ECHO:  Results for orders placed during the hospital encounter of 09/18/23    Adult Transthoracic Echo Complete With Contrast if Necessary Per Protocol    Interpretation Summary    Left ventricular ejection fraction appears to be 21 - 25%.    The left ventricular cavity is borderline dilated.    The following left ventricular wall segments are hypokinetic: mid anterior, apical anterior, basal anterolateral, mid anterolateral, apical lateral, basal inferolateral, mid inferolateral, apical inferior, mid inferior, apical septal, basal inferoseptal, mid inferoseptal, apex hypokinetic, mid anteroseptal, basal anterior, basal inferior and basal inferoseptal.    Left ventricular diastolic function was normal.    The left atrial cavity is moderately dilated.    The  right atrial cavity is mildly  dilated.    Estimated right ventricular systolic pressure from tricuspid regurgitation is mildly elevated (35-45 mmHg).    Mild pulmonary hypertension is present.      ECG 12 Lead Rhythm Change   Preliminary Result   Test Reason : Rhythm Change   Blood Pressure :   */*   mmHG   Vent. Rate :  55 BPM     Atrial Rate : 277 BPM      P-R Int :   * ms          QRS Dur : 130 ms       QT Int : 534 ms       P-R-T Axes :   * -77 159 degrees      QTc Int : 510 ms      Atrial fibrillation with slow ventricular response   Left axis deviation   Nonspecific intraventricular block   Inferior infarct , age undetermined   Cannot rule out Anterior infarct , age undetermined   T wave abnormality, consider lateral ischemia   Abnormal ECG   When compared with ECG of 18-SEP-2023 19:25,   Atrial fibrillation has replaced Wide QRS rhythm      Referred By:            Confirmed By:       ECG 12 Lead   ED Interpretation   Kevin Mcdaniel MD     9/19/2023  5:55 AM   ECG 12 Lead         Date/Time: 9/18/2023 6:49 PM   Performed by: Kevin Mcdaniel MD   Authorized by: Kevin Mcdaniel MD    Interpreted by physician   Comparison: compared with previous ECG from 8/21/2021   Rhythm: atrial fibrillation   Comments: A-fib ventricular rate 81 bpm,  ms, QTc 47 ms, no ST    elevations.  No STEMI.  A-fib is new from previous EKG on file.  This is    my independent interpretation      ECG 12 Lead   ED Interpretation   Kevin Mcdaniel MD     9/19/2023  5:55 AM   ECG 12 Lead         Date/Time: 9/18/2023 7:25 PM   Performed by: Kevin Mcdaniel MD   Authorized by: Kevin Mcdaniel MD    Interpreted by physician   Comparison: compared with previous ECG from 9/18/2023   Rhythm: atrial fibrillation   Comments: A fib ventricular rate 80 bpm,  ms, QTc 512 ms, no ST    elevations.  No STEMI.  A-fib persists.  This is my independent    interpretation.      ECG 12 Lead Rhythm Change   Preliminary  Result   Test Reason : Rhythm Change   Blood Pressure :   */*   mmHG   Vent. Rate :  80 BPM     Atrial Rate : 101 BPM      P-R Int :   * ms          QRS Dur : 130 ms       QT Int : 444 ms       P-R-T Axes :   * -73 110 degrees      QTc Int : 512 ms      Sinus tachycardia with AV dissociation and Wide QRS rhythm   Left axis deviation   Nonspecific intraventricular block   Inferior infarct , age undetermined   Anterolateral infarct , age undetermined   Abnormal ECG   When compared with ECG of 31-AUG-2021 09:47,   Wide QRS rhythm has replaced Sinus rhythm      Referred By:            Confirmed By:       SCANNED EKG   Final Result      SCANNED EKG   Final Result      SCANNED - TELEMETRY     Final Result      SCANNED - TELEMETRY     Final Result      SCANNED - TELEMETRY     Final Result      SCANNED - TELEMETRY     Final Result      SCANNED - TELEMETRY     Final Result      SCANNED - TELEMETRY     Final Result      SCANNED - TELEMETRY     Final Result      SCANNED - TELEMETRY     Final Result      ECG 12 Lead Dyspnea    (Results Pending)        Medication Review:   Current Facility-Administered Medications   Medication Dose Route Frequency Provider Last Rate Last Admin    acetaminophen (TYLENOL) tablet 650 mg  650 mg Oral Q4H PRN Dickson Nichols MD        Or    acetaminophen (TYLENOL) 160 MG/5ML oral solution 650 mg  650 mg Oral Q4H PRN Dickson Nichols MD        Or    acetaminophen (TYLENOL) suppository 650 mg  650 mg Rectal Q4H PRN Dickson Nichols MD        albumin human 25 % IV SOLN 25 g  25 g Intravenous BID Juma Ricardo MD   25 g at 09/21/23 1800    albuterol (PROVENTIL) nebulizer solution 0.083% 2.5 mg/3mL  2.5 mg Nebulization Q4H PRN Dickson Nichols MD        amiodarone (PACERONE) tablet 200 mg  200 mg Oral Q12H Dickson Nichols MD   200 mg at 09/21/23 2114    apixaban (ELIQUIS) tablet 5 mg  5 mg Oral Q12H Dickson Nichols MD   5 mg at 09/21/23 2114    aspirin chewable  tablet 81 mg  81 mg Oral Daily Dickson Nichols MD   81 mg at 09/21/23 1202    carvedilol (COREG) tablet 25 mg  25 mg Oral BID With Meals Dickson Nichols MD   25 mg at 09/21/23 1201    DOBUTamine (DOBUTREX) 1 mg/mL infusion  8 mcg/kg/min Intravenous Continuous Juma Ricardo MD 67.7 mL/hr at 09/21/23 1959 8 mcg/kg/min at 09/21/23 1959    furosemide (LASIX) injection 40 mg  40 mg Intravenous BID Juma Ricardo MD   40 mg at 09/21/23 1845    isosorbide mononitrate (IMDUR) 24 hr tablet 30 mg  30 mg Oral Daily Dickson Nichols MD   30 mg at 09/21/23 1201    melatonin tablet 5 mg  5 mg Oral Nightly PRN Dickson Nichols MD        nitroglycerin (NITROSTAT) SL tablet 0.4 mg  0.4 mg Sublingual Q5 Min PRN Dickson Nichols MD        ondansetron (ZOFRAN) tablet 4 mg  4 mg Oral Q6H PRN Dickson Nichols MD        Or    ondansetron (ZOFRAN) injection 4 mg  4 mg Intravenous Q6H PRN Dickson Nichols MD        sacubitril-valsartan (ENTRESTO) 49-51 MG tablet 1 tablet  1 tablet Oral Q12H Dickson Nichols MD   1 tablet at 09/21/23 2115    Sodium Chloride (PF) 0.9 % 10 mL  10 mL Intravenous Q12H Juma Ricardo MD        sodium chloride 0.9 % flush 10 mL  10 mL Intravenous Q12H Dickson Nichols MD   10 mL at 09/21/23 2114    sodium chloride 0.9 % flush 10 mL  10 mL Intravenous PRN Dickson Nichols MD   10 mL at 09/21/23 1011    sodium chloride 0.9 % infusion 40 mL  40 mL Intravenous PRN Dickson Nichols MD        spironolactone (ALDACTONE) tablet 25 mg  25 mg Oral Daily Dickson Nichols MD   25 mg at 09/21/23 1202       Assessment and Plan:      Peripheral edema    AMRIT (acute kidney injury)    Elevated troponin    Pulmonary vascular congestion    Heart failure, unspecified  1.  Positive troponin.  Patient on admission had positive troponin.  Patient did not have any symptoms of severe substernal chest pain.  Patient underwent a myocardial perfusion Lexiscan  Cardiolite stress test which was suggestive of bony-infarct ischemia with an ejection fraction of 32%.  Patient myocardial perfusion scan was a high risk study.  Patient at the present time has been recommended medical management.  Patient would not be subjected to any invasive evaluation.  Have discussed with the patient as the patient had undergone previous coronary angiogram in 2011 which had not reveal of any evidence of any obstructive epicardial coronary artery disease patient was need further invasive evaluation when the patient pulmonary status improves.  Patient is currently on anticoagulation with Eliquis and would not be subjected to any invasive evaluation with a coronary angiogram.  Have discussed with the patient and family.    2.  Acute on chronic systolic heart failure.  Patient has previous documented nonischemic cardiomyopathy with previous coronary angiogram which had not reveal of evidence of any obstructive epicardial coronary artery disease.  Patient had mild elevation in the troponin.  Patient had new onset of atrial fibrillation which could have made the patient symptoms of shortness of breath worse with acute decompensation.  Patient denies excessive intake of salt.  Patient will be continued with IV dobutamine along with the Lasix Aldactone and Entresto.  Would follow the renal function.  Patient would be continued with IV dobutamine infusion.    3.  Atrial fibrillation.  Patient ventricular rate is controlled.  Patient will be continued on the amiodarone and Eliquis.  Have discussed with the patient and the family consideration for electrical cardioversion versus radiofrequency ablation for the atrial fibrillation was discussed with the patient.  Patient will be continued on amiodarone at the present time along with anticoagulation with Eliquis.    4.  Obesity with a body mass index of 40.  Patient has been counseled on weight reduction lifestyle modification and dietary restriction.   Patient has been explained the risk associated with obesity and the occurrence and progression of atherosclerotic coronary artery disease and peripheral vascular disease.    5.  Chronic anticoagulation with Eliquis.  Patient has not had any hemoptysis hematuria bright red blood per rectum.    6.  Chronic kidney disease.  Patient's creatinine has improved to 1.78.  Patient BUN is 18.  Patient has been followed by nephrology and will follow the renal function with aggressive diuresis.      The above plan of management were discussed with the patient      Electronically signed by Juma Ricardo MD, 09/21/23, 9:40 PM CDT.      Time: Time spent on face-to-face interaction 20 minutes    Dictated utilizing Dragon dictation.

## 2023-09-22 NOTE — PROGRESS NOTES
"[facility]  [address] [place] [address]. [address]  T - [phone]  F - [phone]     Progress Note          PATIENT  DEMOGRAPHICS   PATIENT NAME: Collin Teresa                      PHYSICIAN: ANNA MARIE Covington  : 1965  MRN: [id number]   LOS: 2 days    Patient Care Team:  Zakiya George DO as PCP - General (Family Medicine)  Subjective   SUBJECTIVE   Feeling OK today         Objective   OBJECTIVE   Vital Signs  Temp:  [97.4 °F (36.3 °C)-98 °F (36.7 °C)] 97.6 °F (36.4 °C)  Heart Rate:  [45-70] 65  Resp:  [16-18] 18  BP: (105-119)/(54-74) 110/74    Flowsheet Rows      Flowsheet Row First Filed Value   Admission Height 177.8 cm (70\") Documented at 2023   Admission Weight 143 kg (316 lb) Documented at 2023             I/O last 3 completed shifts:  In: 720 [P.O.:720]  Out: 1500 [Urine:1500]    PHYSICAL EXAM    Physical Exam  Constitutional:       Appearance: He is well-developed.   HENT:      Head: Normocephalic and atraumatic.   Eyes:      Conjunctiva/sclera: Conjunctivae normal.      Pupils: Pupils are equal, round, and reactive to light.   Cardiovascular:      Rate and Rhythm: Normal rate and regular rhythm.   Pulmonary:      Effort: Pulmonary effort is normal.      Breath sounds: Normal breath sounds.   Abdominal:      Palpations: Abdomen is soft.   Musculoskeletal:      Cervical back: Neck supple.      Right lower leg: Edema present.      Left lower leg: Edema present.   Skin:     General: Skin is warm and dry.   Neurological:      Mental Status: He is alert and oriented to person, place, and time.   Psychiatric:         Mood and Affect: Mood normal.         Behavior: Behavior normal.       RESULTS   Results Review:    Results from last 7 days   Lab Units 23  1142 23  1057 23  0521 23  1520   SODIUM mmol/L 136 137 140 141   POTASSIUM mmol/L 3.8 3.6 4.0 4.6   CHLORIDE mmol/L 98 100 103 104   CO2 mmol/L 27.0 26.0 24.0 26.0   BUN mg/dL 18 " 23* 29* 33*   CREATININE mg/dL 1.78* 1.90* 1.90* 2.22*   CALCIUM mg/dL 9.1 9.0 9.4 9.6   BILIRUBIN mg/dL  --   --   --  1.1   ALK PHOS U/L  --   --   --  68   ALT (SGPT) U/L  --   --   --  22   AST (SGOT) U/L  --   --   --  21   GLUCOSE mg/dL 97 116* 89 105*         Estimated Creatinine Clearance: 63.5 mL/min (A) (by C-G formula based on SCr of 1.78 mg/dL (H)).    Results from last 7 days   Lab Units 09/20/23  1057 09/18/23  1924   MAGNESIUM mg/dL 1.9 2.3               Results from last 7 days   Lab Units 09/19/23  0521 09/18/23  0914 09/18/23  0320   WBC 10*3/mm3 7.35 6.5 6.74   HEMOGLOBIN g/dL 14.7 14.6 15.6   PLATELETS 10*3/mm3 192 208 198                 Imaging Results (Last 24 Hours)       ** No results found for the last 24 hours. **             MEDICATIONS    albumin human, 25 g, Intravenous, BID  amiodarone, 200 mg, Oral, Q12H  apixaban, 5 mg, Oral, Q12H  aspirin, 81 mg, Oral, Daily  carvedilol, 25 mg, Oral, BID With Meals  furosemide, 40 mg, Intravenous, BID  isosorbide mononitrate, 30 mg, Oral, Daily  sacubitril-valsartan, 1 tablet, Oral, Q12H  Sodium Chloride (PF), 10 mL, Intravenous, Q12H  sodium chloride, 10 mL, Intravenous, Q12H  spironolactone, 25 mg, Oral, Daily      DOBUTamine, 8 mcg/kg/min, Last Rate: 8 mcg/kg/min (09/22/23 0540)        Assessment & Plan   ASSESSMENT / PLAN      Peripheral edema    AMRIT (acute kidney injury)    Elevated troponin    Pulmonary vascular congestion    Heart failure, unspecified    1.  AMRIT on CKD- Baseline creatinine 1.3-1.4, creatinine up to greater than 2 on arrival and now 1.7.  Etiology of AMRIT possibly cardiorenal versus progression of CKD.  Agree with dobutamine and will continue Lasix 40 mg IV twice daily.  Keep Entresto and spironolactone for now.      -US shows left simple cyst    -Keep current meds, await new labs     2.  CHF- Dr. Ricardo following, plan for Cardiolite stress test     3.  Hypertension     4.  DM2     5.  Dilated cardiomyopathy     6.  Chronic  A-fib      Copied text in this note has been reviewed and is accurate as of 9/22/2020.           This document has been electronically signed by ANNA MARIE Covington on September 22, 2023 07:56 CDT

## 2023-09-23 LAB
ANION GAP SERPL CALCULATED.3IONS-SCNC: 11 MMOL/L (ref 5–15)
BUN SERPL-MCNC: 17 MG/DL (ref 6–20)
BUN/CREAT SERPL: 9.7 (ref 7–25)
CALCIUM SPEC-SCNC: 9.7 MG/DL (ref 8.6–10.5)
CHLORIDE SERPL-SCNC: 100 MMOL/L (ref 98–107)
CO2 SERPL-SCNC: 28 MMOL/L (ref 22–29)
CREAT SERPL-MCNC: 1.76 MG/DL (ref 0.76–1.27)
EGFRCR SERPLBLD CKD-EPI 2021: 44.3 ML/MIN/1.73
GLUCOSE SERPL-MCNC: 89 MG/DL (ref 65–99)
NT-PROBNP SERPL-MCNC: 918.3 PG/ML (ref 0–900)
POTASSIUM SERPL-SCNC: 3.9 MMOL/L (ref 3.5–5.2)
SODIUM SERPL-SCNC: 139 MMOL/L (ref 136–145)
WHOLE BLOOD HOLD SPECIMEN: NORMAL

## 2023-09-23 PROCEDURE — P9047 ALBUMIN (HUMAN), 25%, 50ML: HCPCS | Performed by: INTERNAL MEDICINE

## 2023-09-23 PROCEDURE — 83880 ASSAY OF NATRIURETIC PEPTIDE: CPT

## 2023-09-23 PROCEDURE — 25010000002 FUROSEMIDE PER 20 MG: Performed by: INTERNAL MEDICINE

## 2023-09-23 PROCEDURE — 80048 BASIC METABOLIC PNL TOTAL CA: CPT | Performed by: NURSE PRACTITIONER

## 2023-09-23 PROCEDURE — 25010000002 ALBUMIN HUMAN 25% PER 50 ML: Performed by: INTERNAL MEDICINE

## 2023-09-23 PROCEDURE — 25010000002 DOBUTAMINE PER 250 MG: Performed by: INTERNAL MEDICINE

## 2023-09-23 RX ORDER — FUROSEMIDE 10 MG/ML
80 INJECTION INTRAMUSCULAR; INTRAVENOUS ONCE
Status: DISCONTINUED | OUTPATIENT
Start: 2023-09-23 | End: 2023-09-24 | Stop reason: HOSPADM

## 2023-09-23 RX ADMIN — CARVEDILOL 25 MG: 25 TABLET, FILM COATED ORAL at 08:48

## 2023-09-23 RX ADMIN — ALBUMIN (HUMAN) 25 G: 0.25 INJECTION, SOLUTION INTRAVENOUS at 18:03

## 2023-09-23 RX ADMIN — APIXABAN 5 MG: 5 TABLET, FILM COATED ORAL at 20:07

## 2023-09-23 RX ADMIN — FUROSEMIDE 40 MG: 40 INJECTION, SOLUTION INTRAMUSCULAR; INTRAVENOUS at 08:50

## 2023-09-23 RX ADMIN — FUROSEMIDE 40 MG: 40 INJECTION, SOLUTION INTRAMUSCULAR; INTRAVENOUS at 18:03

## 2023-09-23 RX ADMIN — SODIUM CHLORIDE 10 ML: 9 INJECTION, SOLUTION INTRAMUSCULAR; INTRAVENOUS; SUBCUTANEOUS at 20:08

## 2023-09-23 RX ADMIN — DOBUTAMINE HYDROCHLORIDE 8 MCG/KG/MIN: 100 INJECTION INTRAVENOUS at 23:19

## 2023-09-23 RX ADMIN — AMIODARONE HYDROCHLORIDE 200 MG: 200 TABLET ORAL at 08:48

## 2023-09-23 RX ADMIN — SACUBITRIL AND VALSARTAN 1 TABLET: 49; 51 TABLET, FILM COATED ORAL at 08:47

## 2023-09-23 RX ADMIN — ISOSORBIDE MONONITRATE 30 MG: 30 TABLET, EXTENDED RELEASE ORAL at 08:48

## 2023-09-23 RX ADMIN — ALBUMIN (HUMAN) 25 G: 0.25 INJECTION, SOLUTION INTRAVENOUS at 08:49

## 2023-09-23 RX ADMIN — ASPIRIN 81 MG: 81 TABLET, CHEWABLE ORAL at 08:47

## 2023-09-23 RX ADMIN — SPIRONOLACTONE 25 MG: 25 TABLET ORAL at 08:47

## 2023-09-23 RX ADMIN — DOBUTAMINE HYDROCHLORIDE 8 MCG/KG/MIN: 100 INJECTION INTRAVENOUS at 08:50

## 2023-09-23 RX ADMIN — Medication 10 ML: at 08:50

## 2023-09-23 RX ADMIN — DOBUTAMINE HYDROCHLORIDE 8 MCG/KG/MIN: 100 INJECTION INTRAVENOUS at 19:24

## 2023-09-23 RX ADMIN — APIXABAN 5 MG: 5 TABLET, FILM COATED ORAL at 08:48

## 2023-09-23 RX ADMIN — DOBUTAMINE HYDROCHLORIDE 8 MCG/KG/MIN: 100 INJECTION INTRAVENOUS at 04:57

## 2023-09-23 RX ADMIN — AMIODARONE HYDROCHLORIDE 200 MG: 200 TABLET ORAL at 20:07

## 2023-09-23 RX ADMIN — DOBUTAMINE HYDROCHLORIDE 8 MCG/KG/MIN: 100 INJECTION INTRAVENOUS at 00:30

## 2023-09-23 RX ADMIN — SODIUM CHLORIDE 10 ML: 9 INJECTION, SOLUTION INTRAMUSCULAR; INTRAVENOUS; SUBCUTANEOUS at 08:50

## 2023-09-23 NOTE — PROGRESS NOTES
"[facility]  [address] [place] [address]. [address]  T - [phone]  F - [phone]     Progress Note          PATIENT  DEMOGRAPHICS   PATIENT NAME: Collin Teresa                      PHYSICIAN: ANNA MARIE Santos  : 1965  MRN: [id number]   LOS: 3 days    Patient Care Team:  Zakiya George DO as PCP - General (Family Medicine)  Subjective   SUBJECTIVE   No acute events overnight          Objective   OBJECTIVE   Vital Signs  Temp:  [97.6 °F (36.4 °C)-98.4 °F (36.9 °C)] 98 °F (36.7 °C)  Heart Rate:  [51-72] 60  Resp:  [18] 18  BP: (102-130)/(61-80) 130/80    Flowsheet Rows      Flowsheet Row First Filed Value   Admission Height 177.8 cm (70\") Documented at 2023   Admission Weight 143 kg (316 lb) Documented at 20238             I/O last 3 completed shifts:  In: 240 [P.O.:240]  Out: 700 [Urine:700]    PHYSICAL EXAM    Physical Exam  Constitutional:       Appearance: He is well-developed.   HENT:      Head: Normocephalic and atraumatic.   Eyes:      Conjunctiva/sclera: Conjunctivae normal.      Pupils: Pupils are equal, round, and reactive to light.   Cardiovascular:      Rate and Rhythm: Normal rate and regular rhythm.   Pulmonary:      Effort: Pulmonary effort is normal.      Breath sounds: Normal breath sounds.   Abdominal:      Palpations: Abdomen is soft.   Musculoskeletal:      Cervical back: Neck supple.      Right lower leg: Edema present.      Left lower leg: Edema present.   Skin:     General: Skin is warm and dry.   Neurological:      Mental Status: He is alert and oriented to person, place, and time.   Psychiatric:         Mood and Affect: Mood normal.         Behavior: Behavior normal.       RESULTS   Results Review:    Results from last 7 days   Lab Units 23  0706 23  0827 23  1142 23  0521 23  1520   SODIUM mmol/L 139 137 136   < > 141   POTASSIUM mmol/L 3.9 4.0 3.8   < > 4.6   CHLORIDE mmol/L 100 99 98   < > 104   CO2 " mmol/L 28.0 27.0 27.0   < > 26.0   BUN mg/dL 17 17 18   < > 33*   CREATININE mg/dL 1.76* 1.73* 1.78*   < > 2.22*   CALCIUM mg/dL 9.7 9.6 9.1   < > 9.6   BILIRUBIN mg/dL  --   --   --   --  1.1   ALK PHOS U/L  --   --   --   --  68   ALT (SGPT) U/L  --   --   --   --  22   AST (SGOT) U/L  --   --   --   --  21   GLUCOSE mg/dL 89 99 97   < > 105*    < > = values in this interval not displayed.         Estimated Creatinine Clearance: 63.9 mL/min (A) (by C-G formula based on SCr of 1.76 mg/dL (H)).    Results from last 7 days   Lab Units 09/20/23  1057 09/18/23  1924   MAGNESIUM mg/dL 1.9 2.3               Results from last 7 days   Lab Units 09/19/23  0521 09/18/23  0914 09/18/23  0320   WBC 10*3/mm3 7.35 6.5 6.74   HEMOGLOBIN g/dL 14.7 14.6 15.6   PLATELETS 10*3/mm3 192 208 198                 Imaging Results (Last 24 Hours)       ** No results found for the last 24 hours. **             MEDICATIONS    albumin human, 25 g, Intravenous, BID  amiodarone, 200 mg, Oral, Q12H  apixaban, 5 mg, Oral, Q12H  aspirin, 81 mg, Oral, Daily  carvedilol, 25 mg, Oral, BID With Meals  furosemide, 40 mg, Intravenous, BID  furosemide, 80 mg, Intravenous, Once  isosorbide mononitrate, 30 mg, Oral, Daily  sacubitril-valsartan, 1 tablet, Oral, Q12H  Sodium Chloride (PF), 10 mL, Intravenous, Q12H  sodium chloride, 10 mL, Intravenous, Q12H  spironolactone, 25 mg, Oral, Daily      DOBUTamine, 8 mcg/kg/min, Last Rate: 8 mcg/kg/min (09/23/23 0850)        Assessment & Plan   ASSESSMENT / PLAN      Peripheral edema    AMRIT (acute kidney injury)    Elevated troponin    Pulmonary vascular congestion    Heart failure, unspecified    1.  AMRIT on CKD- Baseline creatinine 1.3-1.4, creatinine up to greater than 2 on arrival and now 1.7.  Etiology of AMRIT possibly cardiorenal versus progression of CKD.  Agree with dobutamine and will continue Lasix 40 mg IV twice daily.  Keep Entresto and spironolactone for now.      -US shows left simple cyst    -Keep  current meds     2.  CHF- Dr. Ricardo following, plan for Cardiolite stress test     3.  Hypertension     4.  DM2     5.  Dilated cardiomyopathy     6.  Chronic A-fib      Copied text in this note has been reviewed and is accurate as of 9/23/2020.           This document has been electronically signed by ANNA MARIE Santos on September 23, 2023 13:17 CDT

## 2023-09-23 NOTE — PROGRESS NOTES
[facility] Medicine Services  INPATIENT PROGRESS NOTE    Length of Stay: 3  Date of Admission: 9/18/2023  Primary Care Physician: Zakiya George, DO    Subjective   (S) Admitted for chest pain and shortness of breath; diagnosed with congestive heart failure  Today, asymptomatic; happy that he is losing weight.    9/23/2023: Doing well today.  Agreeable to stay another day per cardiology recommendations.  Feeling better.    Review of Systems   All other systems reviewed and are negative.     All pertinent negatives and positives are as above. All other systems have been reviewed and are negative unless otherwise stated.     Prior to Admission medications    Medication Sig Start Date End Date Taking? Authorizing Provider   amiodarone (PACERONE) 200 MG tablet Take 1 tablet by mouth Every 12 (Twelve) Hours. 8/15/23  Yes ProviderYaneth MD   carvedilol (COREG) 25 MG tablet TAKE ONE TABLET BY MOUTH TWO TIMES A DAY WITH MEALS 6/20/23  Yes Norma Olivarez APRN   Corlanor 5 MG tablet tablet TAKE ONE TABLET BY MOUTH TWO TIMES A DAY WITH MEALS 3/23/22  Yes Noemi Key APRN   Eliquis 5 MG tablet tablet Take 1 tablet by mouth Every 12 (Twelve) Hours. 8/15/23  Yes Yaneth Mahmood MD   furosemide (LASIX) 40 MG tablet Take 1 tablet by mouth Daily As Needed (weight gain of 3lbs overnight or 5lbs in a week.). 1/19/22  Yes Noemi Key APRN   hydrALAZINE (APRESOLINE) 25 MG tablet Take 1 tablet by mouth 3 (Three) Times a Day. 8/31/21  Yes Noemi Key APRN   isosorbide mononitrate (IMDUR) 30 MG 24 hr tablet TAKE 1 TABLET BY MOUTH DAILY. 10/4/22  Yes Norma Olivarez APRN   spironolactone (ALDACTONE) 25 MG tablet Take 1 tablet by mouth Daily. 8/23/22  Yes Norma Olivarez APRN   albuterol sulfate  (90 Base) MCG/ACT inhaler Inhale 2 puffs Every 4 (Four) Hours As Needed for Wheezing or Shortness of Air. 1/20/22   Noemi Key APRN   aspirin 81  MG chewable tablet Chew 1 tablet Daily.    Provider, MD Yaneth   brompheniramine-pseudoephedrine-DM 30-2-10 MG/5ML syrup Take 2.5 mL by mouth 4 (Four) Times a Day As Needed for Congestion or Allergies. 1/20/22   Noemi Key APRN   magnesium oxide (MAG-OX) 400 MG tablet Take 1 tablet by mouth Daily.    Provider, MD Yaneth       albumin human, 25 g, Intravenous, BID  amiodarone, 200 mg, Oral, Q12H  apixaban, 5 mg, Oral, Q12H  aspirin, 81 mg, Oral, Daily  carvedilol, 25 mg, Oral, BID With Meals  furosemide, 40 mg, Intravenous, BID  isosorbide mononitrate, 30 mg, Oral, Daily  sacubitril-valsartan, 1 tablet, Oral, Q12H  Sodium Chloride (PF), 10 mL, Intravenous, Q12H  sodium chloride, 10 mL, Intravenous, Q12H  spironolactone, 25 mg, Oral, Daily      DOBUTamine, 8 mcg/kg/min, Last Rate: 8 mcg/kg/min (09/23/23 0850)        Objective    Temp:  [97.6 °F (36.4 °C)-98.4 °F (36.9 °C)] 98.2 °F (36.8 °C)  Heart Rate:  [51-72] 72  Resp:  [18] 18  BP: (101-130)/(61-80) 130/80    Physical Exam  Vitals reviewed.   Constitutional:       Appearance: He is obese.   HENT:      Head: Normocephalic.      Nose: Nose normal.      Mouth/Throat:      Mouth: Mucous membranes are moist.   Eyes:      Extraocular Movements: Extraocular movements intact.   Cardiovascular:      Rate and Rhythm: Normal rate. Rhythm irregular.      Heart sounds: Normal heart sounds.   Pulmonary:      Breath sounds: Rales present.   Abdominal:      General: Bowel sounds are normal.      Palpations: Abdomen is soft.   Musculoskeletal:         General: Normal range of motion.      Cervical back: Neck supple.      Right lower leg: Edema present.      Left lower leg: Edema present.   Neurological:      General: No focal deficit present.      Mental Status: He is alert and oriented to person, place, and time.   Psychiatric:         Behavior: Behavior normal.     Results Review:  I have reviewed the labs, radiology results, and diagnostic  studies.    Laboratory Data:   Results from last 7 days   Lab Units 09/23/23  0706 09/22/23  0827 09/21/23  1142 09/19/23  0521 09/18/23  1520   SODIUM mmol/L 139 137 136   < > 141   POTASSIUM mmol/L 3.9 4.0 3.8   < > 4.6   CHLORIDE mmol/L 100 99 98   < > 104   CO2 mmol/L 28.0 27.0 27.0   < > 26.0   BUN mg/dL 17 17 18   < > 33*   CREATININE mg/dL 1.76* 1.73* 1.78*   < > 2.22*   GLUCOSE mg/dL 89 99 97   < > 105*   CALCIUM mg/dL 9.7 9.6 9.1   < > 9.6   BILIRUBIN mg/dL  --   --   --   --  1.1   ALK PHOS U/L  --   --   --   --  68   ALT (SGPT) U/L  --   --   --   --  22   AST (SGOT) U/L  --   --   --   --  21   ANION GAP mmol/L 11.0 11.0 11.0   < > 11.0    < > = values in this interval not displayed.       Estimated Creatinine Clearance: 63.9 mL/min (A) (by C-G formula based on SCr of 1.76 mg/dL (H)).  Results from last 7 days   Lab Units 09/20/23  1057 09/18/23  1924   MAGNESIUM mg/dL 1.9 2.3           Results from last 7 days   Lab Units 09/19/23  0521 09/18/23  0914 09/18/23  0320   WBC 10*3/mm3 7.35 6.5 6.74   HEMOGLOBIN g/dL 14.7 14.6 15.6   HEMATOCRIT % 45.9 47.9 48.4   PLATELETS 10*3/mm3 192 208 198             Culture Data:   No results found for: BLOODCX  No results found for: URINECX  No results found for: RESPCX  No results found for: WOUNDCX  No results found for: STOOLCX  No components found for: BODYFLD    Radiology Data:   Imaging Results (Last 24 Hours)       ** No results found for the last 24 hours. **            I have reviewed the patient's current medications.     Assessment/Plan     Chest pain      In the setting of dilated cardiomyopathy     No chest pain at this time; on dobutamine drip for one more day     Nuclear stress test interpreted by Dr Ricardo; due to his kidney function, no a good candidate for [facility]  9/23/2023: Improving.  Continue current Lasix drip and dobutamine per Dr. Ricardo.  Creatinine stable at 1.76.    Acute on chronic systolic CHF     In the setting of AMRIT on CKD     Blood  pressure has been stalbe     Continue with lasix and albumin and dobutamine     Mild improvement yesterday; monitor    AMRIT on CKD     Likely due to above in the setting of diabetes and hypertension     Keeps improving a little with current treament     Nephrologist consulted     Chronic medical problems     Essential hypertension     Chronic a fib      Type II DM    Medical Decision Making  Number and Complexity of problems: two major complexes  Differential Diagnosis: ACS    Conditions and Status:        Condition is improving.     MDM Data  External documents reviewed: previous medical records  My EKG interpretation: n/a  My plain film interpretation: vascular congestion     Discussed with: RN     Treatment Plan  See above    Care Planning  Shared decision making: his wife   Code status and discussions: full code    Disposition  Social Determinants of Health that impact treatment or disposition: none  I expect the patient to be discharge: in progress    I confirmed that the patient's Advance Care Plan is present, code status is documented, or surrogate decision maker is listed in the patient's medical record.     Constance Brown PA-C

## 2023-09-24 ENCOUNTER — READMISSION MANAGEMENT (OUTPATIENT)
Dept: CALL CENTER | Facility: HOSPITAL | Age: 58
End: 2023-09-24
Payer: COMMERCIAL

## 2023-09-24 VITALS
SYSTOLIC BLOOD PRESSURE: 110 MMHG | BODY MASS INDEX: 41.02 KG/M2 | TEMPERATURE: 97.5 F | HEART RATE: 68 BPM | HEIGHT: 71 IN | WEIGHT: 293 LBS | RESPIRATION RATE: 18 BRPM | OXYGEN SATURATION: 98 % | DIASTOLIC BLOOD PRESSURE: 65 MMHG

## 2023-09-24 LAB
ANION GAP SERPL CALCULATED.3IONS-SCNC: 13 MMOL/L (ref 5–15)
BUN SERPL-MCNC: 21 MG/DL (ref 6–20)
BUN/CREAT SERPL: 10.7 (ref 7–25)
CALCIUM SPEC-SCNC: 9.7 MG/DL (ref 8.6–10.5)
CHLORIDE SERPL-SCNC: 96 MMOL/L (ref 98–107)
CO2 SERPL-SCNC: 27 MMOL/L (ref 22–29)
CREAT SERPL-MCNC: 1.97 MG/DL (ref 0.76–1.27)
EGFRCR SERPLBLD CKD-EPI 2021: 38.7 ML/MIN/1.73
GLUCOSE SERPL-MCNC: 89 MG/DL (ref 65–99)
POTASSIUM SERPL-SCNC: 3.8 MMOL/L (ref 3.5–5.2)
SODIUM SERPL-SCNC: 136 MMOL/L (ref 136–145)

## 2023-09-24 PROCEDURE — 25010000002 ALBUMIN HUMAN 25% PER 50 ML: Performed by: INTERNAL MEDICINE

## 2023-09-24 PROCEDURE — 25010000002 DOBUTAMINE PER 250 MG: Performed by: INTERNAL MEDICINE

## 2023-09-24 PROCEDURE — P9047 ALBUMIN (HUMAN), 25%, 50ML: HCPCS | Performed by: INTERNAL MEDICINE

## 2023-09-24 PROCEDURE — 80048 BASIC METABOLIC PNL TOTAL CA: CPT | Performed by: NURSE PRACTITIONER

## 2023-09-24 PROCEDURE — 25010000002 FUROSEMIDE PER 20 MG: Performed by: INTERNAL MEDICINE

## 2023-09-24 RX ORDER — ATORVASTATIN CALCIUM 10 MG/1
20 TABLET, FILM COATED ORAL DAILY
Qty: 30 TABLET | Refills: 0 | Status: SHIPPED | OUTPATIENT
Start: 2023-09-24 | End: 2023-10-24

## 2023-09-24 RX ADMIN — DOBUTAMINE HYDROCHLORIDE 8 MCG/KG/MIN: 100 INJECTION INTRAVENOUS at 02:53

## 2023-09-24 RX ADMIN — DOBUTAMINE HYDROCHLORIDE 8 MCG/KG/MIN: 100 INJECTION INTRAVENOUS at 06:34

## 2023-09-24 RX ADMIN — SACUBITRIL AND VALSARTAN 1 TABLET: 49; 51 TABLET, FILM COATED ORAL at 08:30

## 2023-09-24 RX ADMIN — ASPIRIN 81 MG: 81 TABLET, CHEWABLE ORAL at 08:30

## 2023-09-24 RX ADMIN — FUROSEMIDE 40 MG: 40 INJECTION, SOLUTION INTRAMUSCULAR; INTRAVENOUS at 08:31

## 2023-09-24 RX ADMIN — ISOSORBIDE MONONITRATE 30 MG: 30 TABLET, EXTENDED RELEASE ORAL at 08:30

## 2023-09-24 RX ADMIN — ALBUMIN (HUMAN) 25 G: 0.25 INJECTION, SOLUTION INTRAVENOUS at 08:29

## 2023-09-24 RX ADMIN — SPIRONOLACTONE 25 MG: 25 TABLET ORAL at 08:30

## 2023-09-24 RX ADMIN — APIXABAN 5 MG: 5 TABLET, FILM COATED ORAL at 08:30

## 2023-09-24 RX ADMIN — AMIODARONE HYDROCHLORIDE 200 MG: 200 TABLET ORAL at 08:30

## 2023-09-24 NOTE — DISCHARGE SUMMARY
[facility] Medicine Services  DISCHARGE SUMMARY       Date of Admission: 9/18/2023  Date of Discharge:  9/24/2023  Primary Care Physician: Zakiya George DO    Presenting Problem/History of Present Illness:  CHF exacerbation [I50.9]  Acute on chronic systolic congestive heart failure [I50.23]  Heart failure, unspecified [I50.9]       Final Discharge Diagnoses:  Chest pain      In the setting of dilated cardiomyopathy     Nuclear stress test interpreted by Dr Ricardo; due to his kidney function, no a good candidate for [facility]; will follow as outpatient     No chest pain since admission     Acute on chronic systolic CHF     In the setting of AMRIT on CKD     Blood pressure has been stable     no longer has shortness of breath;  no lower extremities edema;  tolerating  exercise    AMRIT on CKD     Likely due to above in the setting of diabetes and hypertension     Keeps improving a little with current [facility]     Nephrologist on the case; follow up with Dr Jay     Chronic medical problems     Essential hypertension     Chronic a fib      Type II DM    Consults:   Consults       Date and Time Order Name Status Description    9/20/2023 10:41 AM Inpatient Nephrology Consult Completed     9/19/2023 11:36 AM Inpatient Cardiology Consult              Procedures Performed:                 Pertinent Test Results:   Lab Results (most recent)       Procedure Component Value Units Date/Time    Basic Metabolic Panel [[id number]]  (Abnormal) Collected: 09/24/23 0536    Specimen: Blood Updated: 09/24/23 0655     Glucose 89 mg/dL      BUN 21 mg/dL      Creatinine 1.97 mg/dL      Sodium 136 mmol/L      Potassium 3.8 mmol/L      Chloride 96 mmol/L      CO2 27.0 mmol/L      Calcium 9.7 mg/dL      BUN/Creatinine Ratio 10.7     Anion Gap 13.0 mmol/L      eGFR 38.7 mL/min/1.73     Narrative:      GFR Normal >60  Chronic Kidney Disease <60  Kidney Failure <15      BNP [[id number]]  (Abnormal) Collected:  09/23/23 0706    Specimen: Blood Updated: 09/23/23 0830     proBNP 918.3 pg/mL     Narrative:      Among patients with dyspnea, NT-proBNP is highly sensitive for the detection of acute congestive heart failure. In addition NT-proBNP of <300 pg/ml effectively rules out acute congestive heart failure with 99% negative predictive value.      Extra Tubes [[id number]] Collected: 09/23/23 0706    Specimen: Blood, Venous Line Updated: 09/23/23 0815    Narrative:      The following orders were created for panel order Extra Tubes.  Procedure                               Abnormality         Status                     ---------                               -----------         ------                     Lavender Top[[id number]]                                     Final result                 Please view results for these tests on the individual orders.    Lavender Top [[id number]] Collected: 09/23/23 0706    Specimen: Blood Updated: 09/23/23 0815     Extra Tube hold for add-on     Comment: Auto resulted       Basic Metabolic Panel [[id number]]  (Abnormal) Collected: 09/23/23 0706    Specimen: Blood Updated: 09/23/23 0740     Glucose 89 mg/dL      BUN 17 mg/dL      Creatinine 1.76 mg/dL      Sodium 139 mmol/L      Potassium 3.9 mmol/L      Comment: Slight hemolysis detected by analyzer. Results may be affected.        Chloride 100 mmol/L      CO2 28.0 mmol/L      Calcium 9.7 mg/dL      BUN/Creatinine Ratio 9.7     Anion Gap 11.0 mmol/L      eGFR 44.3 mL/min/1.73     Narrative:      GFR Normal >60  Chronic Kidney Disease <60  Kidney Failure <15      Urinalysis With Microscopic - Urine, Clean Catch [[id number]]  (Abnormal) Collected: 09/20/23 1632    Specimen: Urine, Clean Catch Updated: 09/20/23 1653    Narrative:      The following orders were created for panel order Urinalysis With Microscopic - Urine, Clean Catch.  Procedure                               Abnormality         Status                     ---------                                -----------         ------                     Urinalysis without micro...[[id number]]  Normal              Final result               Urinalysis, Microscopic ...[[id number]]  Abnormal            Final result                 Please view results for these tests on the individual orders.    Urinalysis, Microscopic Only - Urine, Clean Catch [[id number]]  (Abnormal) Collected: 09/20/23 1632    Specimen: Urine, Clean Catch Updated: 09/20/23 1653     RBC, UA 0-2 /HPF      WBC, UA 0-2 /HPF      Bacteria, UA None Seen /HPF      Squamous Epithelial Cells, UA 0-2 /HPF      Hyaline Casts, UA None Seen /LPF      Methodology Automated Microscopy    Urinalysis without microscopic (no culture) - Urine, Clean Catch [[id number]]  (Normal) Collected: 09/20/23 1632    Specimen: Urine, Clean Catch Updated: 09/20/23 1653     Color, UA Yellow     Appearance, UA Clear     pH, UA 7.0     Specific Gravity, UA 1.009     Glucose, UA Negative     Ketones, UA Negative     Bilirubin, UA Negative     Blood, UA Negative     Protein, UA Negative     Leuk Esterase, UA Negative     Nitrite, UA Negative     Urobilinogen, UA 1.0 E.U./dL    Sodium, Urine, Random - Urine, Clean Catch [[id number]] Collected: 09/20/23 1632    Specimen: Urine, Clean Catch Updated: 09/20/23 1647     Sodium, Urine 86 mmol/L     Narrative:      Reference intervals for random urine have not been established.  Clinical usage is dependent upon physician's interpretation in combination with other laboratory tests.       Extra Tubes [[id number]] Collected: 09/20/23 1101    Specimen: Blood, Venous Line Updated: 09/20/23 1215    Narrative:      The following orders were created for panel order Extra Tubes.  Procedure                               Abnormality         Status                     ---------                               -----------         ------                     Lavender Top[[id number]]                                     Final result                 Please view  results for these tests on the individual orders.    Lavender Top [[id number]] Collected: 09/20/23 1101    Specimen: Blood Updated: 09/20/23 1215     Extra Tube hold for add-on     Comment: Auto resulted       Magnesium [[id number]]  (Normal) Collected: 09/20/23 1057    Specimen: Blood Updated: 09/20/23 1133     Magnesium 1.9 mg/dL     BNP [[id number]]  (Abnormal) Collected: 09/20/23 0710    Specimen: Blood Updated: 09/20/23 0758     proBNP 1,097.0 pg/mL     Narrative:      Among patients with dyspnea, NT-proBNP is highly sensitive for the detection of acute congestive heart failure. In addition NT-proBNP of <300 pg/ml effectively rules out acute congestive heart failure with 99% negative predictive value.      High Sensitivity Troponin T 2Hr [[id number]]  (Abnormal) Collected: 09/19/23 0925    Specimen: Blood Updated: 09/19/23 0953     HS Troponin T 35 ng/L      Troponin T Delta 0 ng/L     Narrative:      High Sensitive Troponin T Reference Range:  <10.0 ng/L- Negative Female for AMI  <15.0 ng/L- Negative Male for AMI  >=10 - Abnormal Female indicating possible myocardial injury.  >=15 - Abnormal Male indicating possible myocardial injury.   Clinicians would have to utilize clinical acumen, EKG, Troponin, and serial changes to determine if it is an Acute Myocardial Infarction or myocardial injury due to an underlying chronic condition.         High Sensitivity Troponin T [[id number]]  (Abnormal) Collected: 09/19/23 0521    Specimen: Blood Updated: 09/19/23 0914     HS Troponin T 35 ng/L     Narrative:      High Sensitive Troponin T Reference Range:  <10.0 ng/L- Negative Female for AMI  <15.0 ng/L- Negative Male for AMI  >=10 - Abnormal Female indicating possible myocardial injury.  >=15 - Abnormal Male indicating possible myocardial injury.   Clinicians would have to utilize clinical acumen, EKG, Troponin, and serial changes to determine if it is an Acute Myocardial Infarction or myocardial injury due to an underlying  chronic condition.         CBC & Differential [[id number]]  (Normal) Collected: 09/19/23 0521    Specimen: Blood Updated: 09/19/23 0615    Narrative:      The following orders were created for panel order CBC & Differential.  Procedure                               Abnormality         Status                     ---------                               -----------         ------                     CBC Auto Differential[[id number]]        Normal              Final result                 Please view results for these tests on the individual orders.    CBC Auto Differential [[id number]]  (Normal) Collected: 09/19/23 0521    Specimen: Blood Updated: 09/19/23 0615     WBC 7.35 10*3/mm3      RBC 4.79 10*6/mm3      Hemoglobin 14.7 g/dL      Hematocrit 45.9 %      MCV 95.8 fL      MCH 30.7 pg      MCHC 32.0 g/dL      RDW 14.0 %      RDW-SD 49.3 fl      MPV 11.1 fL      Platelets 192 10*3/mm3      Neutrophil % 65.3 %      Lymphocyte % 26.3 %      Monocyte % 6.0 %      Eosinophil % 1.4 %      Basophil % 0.7 %      Immature Grans % 0.3 %      Neutrophils, Absolute 4.81 10*3/mm3      Lymphocytes, Absolute 1.93 10*3/mm3      Monocytes, Absolute 0.44 10*3/mm3      Eosinophils, Absolute 0.10 10*3/mm3      Basophils, Absolute 0.05 10*3/mm3      Immature Grans, Absolute 0.02 10*3/mm3      nRBC 0.0 /100 WBC     Gold Top - SST [[id number]] Collected: 09/18/23 1924    Specimen: Blood Updated: 09/18/23 2031     Extra Tube Hold for add-ons.     Comment: Auto resulted.       Light Blue Top [[id number]] Collected: 09/18/23 1924    Specimen: Blood Updated: 09/18/23 2031     Extra Tube Hold for add-ons.     Comment: Auto resulted       Single High Sensitivity Troponin T [[id number]]  (Abnormal) Collected: 09/18/23 1924    Specimen: Blood Updated: 09/18/23 2001     HS Troponin T 38 ng/L     Narrative:      High Sensitive Troponin T Reference Range:  <10.0 ng/L- Negative Female for AMI  <15.0 ng/L- Negative Male for AMI  >=10 - Abnormal Female  indicating possible myocardial injury.  >=15 - Abnormal Male indicating possible myocardial injury.   Clinicians would have to utilize clinical acumen, EKG, Troponin, and serial changes to determine if it is an Acute Myocardial Infarction or myocardial injury due to an underlying chronic condition.         Magnesium [[id number]]  (Normal) Collected: 09/18/23 1924    Specimen: Blood Updated: 09/18/23 1945     Magnesium 2.3 mg/dL           Imaging Results (Most Recent)       Procedure Component Value Units Date/Time    US Renal Bilateral [[id number]] Collected: 09/20/23 1429     Updated: 09/20/23 1634    Narrative:      INDICATION:  ckd 3 a esequiel.    COMPARISON:  None relevant.    TECHNIQUE:  High-resolution grayscale and color Doppler ultrasound was performed of the  bilateral kidneys and the urinary bladder.    FINDINGS:  Left kidney contains a simple appearing cyst 6.4 cm; kidneys otherwise appear  normal.  Renal lengths measure 11.3 cm on the right and 11.7 cm on the left.    Urinary bladder appears normal.      Impression:      Left simple renal cyst.          XR Chest 1 View [[id number]] Collected: 09/18/23 2220     Updated: 09/18/23 2223    Narrative:      XR CHEST 1 VIEW    HISTORY: elevated BNP    COMPARISON: None.    FINDINGS:  Frontal view of the chest was obtained.    There is mild vascular congestion.. The cardiac silhouette is enlarged. There is  no significant pleural effusion or pneumothorax.    The osseous structures and surrounding soft tissues demonstrate no acute  abnormality.    Upper abdomen is unremarkable.        Impression:      1. Mild vascular congestion without focal infiltrate.                Chief Complaint on Day of Discharge: None    Hospital Course:  The patient is a 58 y.o. male who presented to [facility] with shortness of breath, progressive lower extremities edema. Admitted with congestive heart failure; due to his CKD, he was placed on albumin, lasix and dobutamine  "under the direction of Dr Ricardo and Dr Jay. He lost 27 lbs and went home with no swelling on the lower extremities and no shortness of breath upon walking. Will follow with Dr Ricardo and Dr Jay as outpatient     Condition on Discharge:  Stable    Physical Exam on Discharge:  /65 (BP Location: Right arm, Patient Position: Lying)   Pulse 62   Temp 97.8 °F (36.6 °C) (Temporal)   Resp 18   Ht 180.3 cm (71\")   Wt 133 kg (293 lb)   SpO2 97%   BMI 40.87 kg/m²   Physical Exam  Vitals reviewed.   Constitutional:       Appearance: He is obese.   HENT:      Head: Normocephalic.      Nose: Nose normal.      Mouth/Throat:      Mouth: Mucous membranes are moist.   Eyes:      Extraocular Movements: Extraocular movements intact.   Cardiovascular:      Rate and Rhythm: Regular rhythm.   Pulmonary:      Breath sounds: Normal breath sounds.   Abdominal:      General: Bowel sounds are normal.      Palpations: Abdomen is soft.   Musculoskeletal:         General: Normal range of motion.      Cervical back: Normal range of motion and neck supple.      Right lower leg: No edema.      Left lower leg: No edema.   Skin:     General: Skin is warm.   Neurological:      General: No focal deficit present.      Mental Status: He is alert and oriented to person, place, and time.   Psychiatric:         Behavior: Behavior normal.         Discharge Disposition:  Home or Self Care    Discharge Medications:     Discharge Medications        New Medications        Instructions Start Date   atorvastatin 10 MG tablet  Commonly known as: LIPITOR   20 mg, Oral, Daily      sacubitril-valsartan 49-51 MG tablet  Commonly known as: ENTRESTO   1 tablet, Oral, Every 12 Hours Scheduled             Continue These Medications        Instructions Start Date   albuterol sulfate  (90 Base) MCG/ACT inhaler  Commonly known as: PROVENTIL HFA;VENTOLIN HFA;PROAIR HFA   2 puffs, Inhalation, Every 4 Hours PRN      amiodarone 200 MG tablet  Commonly " known as: PACERONE   1 tablet, Oral, Every 12 Hours Scheduled      aspirin 81 MG chewable tablet   81 mg, Oral, Daily      brompheniramine-pseudoephedrine-DM 30-2-10 MG/5ML syrup   2.5 mL, Oral, 4 Times Daily PRN      carvedilol 25 MG tablet  Commonly known as: COREG   TAKE ONE TABLET BY MOUTH TWO TIMES A DAY WITH MEALS      Eliquis 5 MG tablet tablet  Generic drug: apixaban   5 mg, Oral, Every 12 Hours Scheduled      furosemide 40 MG tablet  Commonly known as: LASIX   40 mg, Oral, Daily PRN      isosorbide mononitrate 30 MG 24 hr tablet  Commonly known as: IMDUR   30 mg, Oral, Daily      magnesium oxide 400 MG tablet  Commonly known as: MAG-OX   400 mg, Oral, Daily      spironolactone 25 MG tablet  Commonly known as: ALDACTONE   25 mg, Oral, Daily             Stop These Medications      Corlanor 5 MG tablet tablet  Generic drug: ivabradine HCl     hydrALAZINE 25 MG tablet  Commonly known as: APRESOLINE               Discharge Diet:   Diet Instructions       Diet: Cardiac Diets; No Salt Packet; Thin (IDDSI 0)      Discharge Diet: Cardiac Diets    Cardiac Diet: No Salt Packet    Fluid Consistency: Thin (IDDSI 0)            Activity at Discharge: as tolerated     Discharge Care Plan/Instructions: see chart    Follow-up Appointments:   No future appointments.    Test Results Pending at Discharge:     Rachid Garza MD    Time: 28 min

## 2023-09-24 NOTE — PROGRESS NOTES
"[facility]  [address] [place] [address]. [address]  T - [phone]  F - [phone]     Progress Note          PATIENT  DEMOGRAPHICS   PATIENT NAME: Collin Teresa                      PHYSICIAN: ANNA MARIE Santos  : 1965  MRN: [id number]   LOS: 4 days    Patient Care Team:  Zakiya George DO as PCP - General (Family Medicine)  Subjective   SUBJECTIVE   No acute events overnight          Objective   OBJECTIVE   Vital Signs  Temp:  [97.1 °F (36.2 °C)-98.1 °F (36.7 °C)] 97.8 °F (36.6 °C)  Heart Rate:  [48-68] 62  Resp:  [18] 18  BP: (100-133)/(55-75) 107/65    Flowsheet Rows      Flowsheet Row First Filed Value   Admission Height 177.8 cm (70\") Documented at [address]   Admission Weight 143 kg (316 lb) Documented at 2023 1848             No intake/output data recorded.    PHYSICAL EXAM    Physical Exam  Constitutional:       Appearance: He is well-developed.   HENT:      Head: Normocephalic and atraumatic.   Eyes:      Conjunctiva/sclera: Conjunctivae normal.      Pupils: Pupils are equal, round, and reactive to light.   Cardiovascular:      Rate and Rhythm: Normal rate and regular rhythm.   Pulmonary:      Effort: Pulmonary effort is normal.      Breath sounds: Normal breath sounds.   Abdominal:      Palpations: Abdomen is soft.   Musculoskeletal:      Cervical back: Neck supple.      Right lower leg: No edema.      Left lower leg: No edema.   Skin:     General: Skin is warm and dry.   Neurological:      Mental Status: He is alert and oriented to person, place, and time.   Psychiatric:         Mood and Affect: Mood normal.         Behavior: Behavior normal.       RESULTS   Results Review:    Results from last 7 days   Lab Units 23  0536 23  0706 23  0827 23  0521 23  1520   SODIUM mmol/L 136 139 137   < > 141   POTASSIUM mmol/L 3.8 3.9 4.0   < > 4.6   CHLORIDE mmol/L 96* 100 99   < > 104   CO2 mmol/L 27.0 28.0 27.0   < > 26.0   BUN mg/dL 21* " 17 17   < > 33*   CREATININE mg/dL 1.97* 1.76* 1.73*   < > 2.22*   CALCIUM mg/dL 9.7 9.7 9.6   < > 9.6   BILIRUBIN mg/dL  --   --   --   --  1.1   ALK PHOS U/L  --   --   --   --  68   ALT (SGPT) U/L  --   --   --   --  22   AST (SGOT) U/L  --   --   --   --  21   GLUCOSE mg/dL 89 89 99   < > 105*    < > = values in this interval not displayed.         Estimated Creatinine Clearance: 56.9 mL/min (A) (by C-G formula based on SCr of 1.97 mg/dL (H)).    Results from last 7 days   Lab Units 09/20/23  1057 09/18/23  1924   MAGNESIUM mg/dL 1.9 2.3               Results from last 7 days   Lab Units 09/19/23  0521 09/18/23  0914 09/18/23  0320   WBC 10*3/mm3 7.35 6.5 6.74   HEMOGLOBIN g/dL 14.7 14.6 15.6   PLATELETS 10*3/mm3 192 208 198                 Imaging Results (Last 24 Hours)       ** No results found for the last 24 hours. **             MEDICATIONS    albumin human, 25 g, Intravenous, BID  amiodarone, 200 mg, Oral, Q12H  apixaban, 5 mg, Oral, Q12H  aspirin, 81 mg, Oral, Daily  carvedilol, 25 mg, Oral, BID With Meals  furosemide, 40 mg, Intravenous, BID  furosemide, 80 mg, Intravenous, Once  isosorbide mononitrate, 30 mg, Oral, Daily  sacubitril-valsartan, 1 tablet, Oral, Q12H  Sodium Chloride (PF), 10 mL, Intravenous, Q12H  sodium chloride, 10 mL, Intravenous, Q12H  spironolactone, 25 mg, Oral, Daily      DOBUTamine, 8 mcg/kg/min, Last Rate: 8 mcg/kg/min (09/24/23 0634)        Assessment & Plan   ASSESSMENT / PLAN      Peripheral edema    AMRIT (acute kidney injury)    Elevated troponin    Pulmonary vascular congestion    Heart failure, unspecified    1.  AMRIT on CKD- Baseline creatinine 1.3-1.4, creatinine up to greater than 2 on arrival and now 1.7-1.9.  Etiology of AMRIT possibly cardiorenal versus progression of CKD.  Agree with dobutamine and will continue Lasix 40 mg IV twice daily.  Keep Entresto and spironolactone for now.  Okay to go home from a nephrology standpoint    -US shows left simple cyst    -Keep  current meds     2.  CHF- Dr. Ricardo following, plan for Cardiolite stress test     3.  Hypertension     4.  DM2     5.  Dilated cardiomyopathy     6.  Chronic A-fib      Copied text in this note has been reviewed and is accurate as of 9/24/2020.           This document has been electronically signed by ANNA MARIE Santos on September 24, 2023 10:05 CDT

## 2023-09-24 NOTE — OUTREACH NOTE
Prep Survey      Flowsheet Row Responses   [facility] discharged [facility]? [place]   Is LACE score < 7 ? No   Eligibility Readm Mgmt   Discharge diagnosis A/C CHF, AMRIT on CKD   Does the patient have one of the following disease processes/diagnoses(primary or secondary)? CHF   Does the patient have Home health ordered? No   Is there a DME ordered? No   Prep survey completed? Yes            Shelbi RAMIREZ - Registered Nurse

## 2023-09-25 ENCOUNTER — DOCUMENTATION (OUTPATIENT)
Dept: CARDIAC REHAB | Facility: HOSPITAL | Age: 58
End: 2023-09-25

## 2023-09-25 LAB
BH CV REST NUCLEAR ISOTOPE DOSE: 33.1 MCI
BH CV STRESS COMMENTS STAGE 1: NORMAL
BH CV STRESS DOSE REGADENOSON STAGE 1: 0.4
BH CV STRESS DURATION MIN STAGE 1: 0
BH CV STRESS DURATION SEC STAGE 1: 10
BH CV STRESS NUCLEAR ISOTOPE DOSE: 37.4 MCI
BH CV STRESS PROTOCOL 1: NORMAL
BH CV STRESS RECOVERY BP: NORMAL MMHG
BH CV STRESS RECOVERY HR: 67 BPM
BH CV STRESS RECOVERY O2: 95 %
BH CV STRESS STAGE 1: 1
LV EF NUC BP: 32 %
MAXIMAL PREDICTED HEART RATE: 162 BPM
PERCENT MAX PREDICTED HR: 44.44 %
STRESS BASELINE BP: NORMAL MMHG
STRESS BASELINE HR: 60 BPM
STRESS O2 SAT REST: 95 %
STRESS PERCENT HR: 52 %
STRESS POST O2 SAT PEAK: 97 %
STRESS POST PEAK BP: NORMAL MMHG
STRESS POST PEAK HR: 72 BPM
STRESS TARGET HR: 138 BPM

## 2023-09-25 NOTE — PAYOR COMM NOTE
"Sophiathomas Yap  [facility]  Case Management Extender  [phone] phone  [phone] fax      Auth# [id number]     Collin Teresa (58 y.o. Male)       Date of Birth   1965    Social Security Number       Address   [address] [place] [address]  [place] [address]    Home Phone   [phone]    MRN   [id number]       [place]   [facility] [place]    Marital Status                               Admission Date   9/18/23    Admission Type   Emergency    Admitting Provider   Dickson Nichols MD    Attending Provider       Department, Room/Bed   [address], 320/1       Discharge Date   9/24/2023    Discharge Disposition   Home or Self Care    Discharge Destination                                 Attending Provider: (none)   Allergies: Dexamethasone    Isolation: None   Infection: None   Code Status: Prior    Ht: 180.3 cm (71\")   Wt: 133 kg (293 lb)    Admission Cmt: None   Principal Problem: CHF exacerbation [I50.9]                   Active Insurance as of 9/18/2023       Primary Coverage       Payor Plan Insurance Group Employer/Plan Group    [employer]ANCE [employer]ANCE [id number]       Payor Plan Address Payor Plan Phone Number Payor Plan Fax Number Effective Dates    [address] [phone]  1/1/2020 - None Entered    [place] [id number]         Subscriber Name Subscriber Birth Date Member ID       COLLIN TERESA 1965 [id number]                     Emergency Contacts        (Rel.) Home Phone Work Phone Mobile Phone    Josie Teresa (Spouse) [phone] -- [phone]                 Discharge Summary        Rachid Garza MD at 09/24/23 [facility]              [facility] Medicine Services  DISCHARGE SUMMARY       Date of Admission: 9/18/2023  Date of Discharge:  9/24/2023  Primary Care Physician: Zakiya George DO    Presenting " Problem/History of Present Illness:  CHF exacerbation [I50.9]  Acute on chronic systolic congestive heart failure [I50.23]  Heart failure, unspecified [I50.9]       Final Discharge Diagnoses:  Chest pain      In the setting of dilated cardiomyopathy     Nuclear stress test interpreted by Dr Ricardo; due to his kidney function, no a good candidate for [facility]; will follow as outpatient     No chest pain since admission     Acute on chronic systolic CHF     In the setting of AMRIT on CKD     Blood pressure has been stable     no longer has shortness of breath;  no lower extremities edema;  tolerating  exercise    AMRIT on CKD     Likely due to above in the setting of diabetes and hypertension     Keeps improving a little with current [facility]     Nephrologist on the case; follow up with Dr Jay     Chronic medical problems     Essential hypertension     Chronic a fib      Type II DM    Consults:   Consults       Date and Time Order Name Status Description    9/20/2023 10:41 AM Inpatient Nephrology Consult Completed     9/19/2023 11:36 AM Inpatient Cardiology Consult              Procedures Performed:                 Pertinent Test Results:   Lab Results (most recent)       Procedure Component Value Units Date/Time    Basic Metabolic Panel [[id number]]  (Abnormal) Collected: 09/24/23 0536    Specimen: Blood Updated: 09/24/23 0655     Glucose 89 mg/dL      BUN 21 mg/dL      Creatinine 1.97 mg/dL      Sodium 136 mmol/L      Potassium 3.8 mmol/L      Chloride 96 mmol/L      CO2 27.0 mmol/L      Calcium 9.7 mg/dL      BUN/Creatinine Ratio 10.7     Anion Gap 13.0 mmol/L      eGFR 38.7 mL/min/1.73     Narrative:      GFR Normal >60  Chronic Kidney Disease <60  Kidney Failure <15      BNP [[id number]]  (Abnormal) Collected: 09/23/23 0706    Specimen: Blood Updated: 09/23/23 0830     proBNP 918.3 pg/mL     Narrative:      Among patients with dyspnea, NT-proBNP is highly sensitive for the detection of acute congestive heart failure. In  addition NT-proBNP of <300 pg/ml effectively rules out acute congestive heart failure with 99% negative predictive value.      Extra Tubes [[id number]] Collected: 09/23/23 0706    Specimen: Blood, Venous Line Updated: 09/23/23 0815    Narrative:      The following orders were created for panel order Extra Tubes.  Procedure                               Abnormality         Status                     ---------                               -----------         ------                     Lavender Top[[id number]]                                     Final result                 Please view results for these tests on the individual orders.    Lavender Top [[id number]] Collected: 09/23/23 0706    Specimen: Blood Updated: 09/23/23 0815     Extra Tube hold for add-on     Comment: Auto resulted       Basic Metabolic Panel [[id number]]  (Abnormal) Collected: 09/23/23 0706    Specimen: Blood Updated: 09/23/23 0740     Glucose 89 mg/dL      BUN 17 mg/dL      Creatinine 1.76 mg/dL      Sodium 139 mmol/L      Potassium 3.9 mmol/L      Comment: Slight hemolysis detected by analyzer. Results may be affected.        Chloride 100 mmol/L      CO2 28.0 mmol/L      Calcium 9.7 mg/dL      BUN/Creatinine Ratio 9.7     Anion Gap 11.0 mmol/L      eGFR 44.3 mL/min/1.73     Narrative:      GFR Normal >60  Chronic Kidney Disease <60  Kidney Failure <15      Urinalysis With Microscopic - Urine, Clean Catch [[id number]]  (Abnormal) Collected: 09/20/23 1632    Specimen: Urine, Clean Catch Updated: 09/20/23 1653    Narrative:      The following orders were created for panel order Urinalysis With Microscopic - Urine, Clean Catch.  Procedure                               Abnormality         Status                     ---------                               -----------         ------                     Urinalysis without micro...[[id number]]  Normal              Final result               Urinalysis, Microscopic ...[[id number]]  Abnormal            Final  result                 Please view results for these tests on the individual orders.    Urinalysis, Microscopic Only - Urine, Clean Catch [[id number]]  (Abnormal) Collected: 09/20/23 1632    Specimen: Urine, Clean Catch Updated: 09/20/23 1653     RBC, UA 0-2 /HPF      WBC, UA 0-2 /HPF      Bacteria, UA None Seen /HPF      Squamous Epithelial Cells, UA 0-2 /HPF      Hyaline Casts, UA None Seen /LPF      Methodology Automated Microscopy    Urinalysis without microscopic (no culture) - Urine, Clean Catch [[id number]]  (Normal) Collected: 09/20/23 1632    Specimen: Urine, Clean Catch Updated: 09/20/23 1653     Color, UA Yellow     Appearance, UA Clear     pH, UA 7.0     Specific Gravity, UA 1.009     Glucose, UA Negative     Ketones, UA Negative     Bilirubin, UA Negative     Blood, UA Negative     Protein, UA Negative     Leuk Esterase, UA Negative     Nitrite, UA Negative     Urobilinogen, UA 1.0 E.U./dL    Sodium, Urine, Random - Urine, Clean Catch [[id number]] Collected: 09/20/23 1632    Specimen: Urine, Clean Catch Updated: 09/20/23 1647     Sodium, Urine 86 mmol/L     Narrative:      Reference intervals for random urine have not been established.  Clinical usage is dependent upon physician's interpretation in combination with other laboratory tests.       Extra Tubes [[id number]] Collected: 09/20/23 1101    Specimen: Blood, Venous Line Updated: 09/20/23 1215    Narrative:      The following orders were created for panel order Extra Tubes.  Procedure                               Abnormality         Status                     ---------                               -----------         ------                     Lavender Top[[id number]]                                     Final result                 Please view results for these tests on the individual orders.    Lavender Top [[id number]] Collected: 09/20/23 1101    Specimen: Blood Updated: 09/20/23 1215     Extra Tube hold for add-on     Comment: Auto resulted        Magnesium [[id number]]  (Normal) Collected: 09/20/23 1057    Specimen: Blood Updated: 09/20/23 1133     Magnesium 1.9 mg/dL     BNP [[id number]]  (Abnormal) Collected: 09/20/23 0710    Specimen: Blood Updated: 09/20/23 0758     proBNP 1,097.0 pg/mL     Narrative:      Among patients with dyspnea, NT-proBNP is highly sensitive for the detection of acute congestive heart failure. In addition NT-proBNP of <300 pg/ml effectively rules out acute congestive heart failure with 99% negative predictive value.      High Sensitivity Troponin T 2Hr [[id number]]  (Abnormal) Collected: 09/19/23 0925    Specimen: Blood Updated: 09/19/23 0953     HS Troponin T 35 ng/L      Troponin T Delta 0 ng/L     Narrative:      High Sensitive Troponin T Reference Range:  <10.0 ng/L- Negative Female for AMI  <15.0 ng/L- Negative Male for AMI  >=10 - Abnormal Female indicating possible myocardial injury.  >=15 - Abnormal Male indicating possible myocardial injury.   Clinicians would have to utilize clinical acumen, EKG, Troponin, and serial changes to determine if it is an Acute Myocardial Infarction or myocardial injury due to an underlying chronic condition.         High Sensitivity Troponin T [[id number]]  (Abnormal) Collected: 09/19/23 0521    Specimen: Blood Updated: 09/19/23 0914     HS Troponin T 35 ng/L     Narrative:      High Sensitive Troponin T Reference Range:  <10.0 ng/L- Negative Female for AMI  <15.0 ng/L- Negative Male for AMI  >=10 - Abnormal Female indicating possible myocardial injury.  >=15 - Abnormal Male indicating possible myocardial injury.   Clinicians would have to utilize clinical acumen, EKG, Troponin, and serial changes to determine if it is an Acute Myocardial Infarction or myocardial injury due to an underlying chronic condition.         CBC & Differential [[id number]]  (Normal) Collected: 09/19/23 0521    Specimen: Blood Updated: 09/19/23 0615    Narrative:      The following orders were created for panel order CBC &  Differential.  Procedure                               Abnormality         Status                     ---------                               -----------         ------                     CBC Auto Differential[[id number]]        Normal              Final result                 Please view results for these tests on the individual orders.    CBC Auto Differential [[id number]]  (Normal) Collected: 09/19/23 0521    Specimen: Blood Updated: 09/19/23 0615     WBC 7.35 10*3/mm3      RBC 4.79 10*6/mm3      Hemoglobin 14.7 g/dL      Hematocrit 45.9 %      MCV 95.8 fL      MCH 30.7 pg      MCHC 32.0 g/dL      RDW 14.0 %      RDW-SD 49.3 fl      MPV 11.1 fL      Platelets 192 10*3/mm3      Neutrophil % 65.3 %      Lymphocyte % 26.3 %      Monocyte % 6.0 %      Eosinophil % 1.4 %      Basophil % 0.7 %      Immature Grans % 0.3 %      Neutrophils, Absolute 4.81 10*3/mm3      Lymphocytes, Absolute 1.93 10*3/mm3      Monocytes, Absolute 0.44 10*3/mm3      Eosinophils, Absolute 0.10 10*3/mm3      Basophils, Absolute 0.05 10*3/mm3      Immature Grans, Absolute 0.02 10*3/mm3      nRBC 0.0 /100 WBC     Gold Top - SST [[id number]] Collected: 09/18/23 1924    Specimen: Blood Updated: 09/18/23 2031     Extra Tube Hold for add-ons.     Comment: Auto resulted.       Light Blue Top [[id number]] Collected: 09/18/23 1924    Specimen: Blood Updated: 09/18/23 2031     Extra Tube Hold for add-ons.     Comment: Auto resulted       Single High Sensitivity Troponin T [[id number]]  (Abnormal) Collected: 09/18/23 1924    Specimen: Blood Updated: 09/18/23 2001     HS Troponin T 38 ng/L     Narrative:      High Sensitive Troponin T Reference Range:  <10.0 ng/L- Negative Female for AMI  <15.0 ng/L- Negative Male for AMI  >=10 - Abnormal Female indicating possible myocardial injury.  >=15 - Abnormal Male indicating possible myocardial injury.   Clinicians would have to utilize clinical acumen, EKG, Troponin, and serial changes to determine if it is an Acute  Myocardial Infarction or myocardial injury due to an underlying chronic condition.         Magnesium [[id number]]  (Normal) Collected: 09/18/23 1924    Specimen: Blood Updated: 09/18/23 1945     Magnesium 2.3 mg/dL           Imaging Results (Most Recent)       Procedure Component Value Units Date/Time    US Renal Bilateral [[id number]] Collected: 09/20/23 1429     Updated: 09/20/23 1634    Narrative:      INDICATION:  ckd 3 a esequiel.    COMPARISON:  None relevant.    TECHNIQUE:  High-resolution grayscale and color Doppler ultrasound was performed of the  bilateral kidneys and the urinary bladder.    FINDINGS:  Left kidney contains a simple appearing cyst 6.4 cm; kidneys otherwise appear  normal.  Renal lengths measure 11.3 cm on the right and 11.7 cm on the left.    Urinary bladder appears normal.      Impression:      Left simple renal cyst.          XR Chest 1 View [[id number]] Collected: 09/18/23 2220     Updated: 09/18/23 2223    Narrative:      XR CHEST 1 VIEW    HISTORY: elevated BNP    COMPARISON: None.    FINDINGS:  Frontal view of the chest was obtained.    There is mild vascular congestion.. The cardiac silhouette is enlarged. There is  no significant pleural effusion or pneumothorax.    The osseous structures and surrounding soft tissues demonstrate no acute  abnormality.    Upper abdomen is unremarkable.        Impression:      1. Mild vascular congestion without focal infiltrate.                Chief Complaint on Day of Discharge: None    Hospital Course:  The patient is a 58 y.o. male who presented to [facility] with shortness of breath, progressive lower extremities edema. Admitted with congestive heart failure; due to his CKD, he was placed on albumin, lasix and dobutamine under the direction of Dr Ricardo and Dr Jay. He lost 27 lbs and went home with no swelling on the lower extremities and no shortness of breath upon walking. Will follow with Dr Ricardo and Dr Jay as outpatient  "    Condition on Discharge:  Stable    Physical Exam on Discharge:  /65 (BP Location: Right arm, Patient Position: Lying)   Pulse 62   Temp 97.8 °F (36.6 °C) (Temporal)   Resp 18   Ht 180.3 cm (71\")   Wt 133 kg (293 lb)   SpO2 97%   BMI 40.87 kg/m²   Physical Exam  Vitals reviewed.   Constitutional:       Appearance: He is obese.   HENT:      Head: Normocephalic.      Nose: Nose normal.      Mouth/Throat:      Mouth: Mucous membranes are moist.   Eyes:      Extraocular Movements: Extraocular movements intact.   Cardiovascular:      Rate and Rhythm: Regular rhythm.   Pulmonary:      Breath sounds: Normal breath sounds.   Abdominal:      General: Bowel sounds are normal.      Palpations: Abdomen is soft.   Musculoskeletal:         General: Normal range of motion.      Cervical back: Normal range of motion and neck supple.      Right lower leg: No edema.      Left lower leg: No edema.   Skin:     General: Skin is warm.   Neurological:      General: No focal deficit present.      Mental Status: He is alert and oriented to person, place, and time.   Psychiatric:         Behavior: Behavior normal.         Discharge Disposition:  Home or Self Care    Discharge Medications:     Discharge Medications        New Medications        Instructions Start Date   atorvastatin 10 MG tablet  Commonly known as: LIPITOR   20 mg, Oral, Daily      sacubitril-valsartan 49-51 MG tablet  Commonly known as: ENTRESTO   1 tablet, Oral, Every 12 Hours Scheduled             Continue These Medications        Instructions Start Date   albuterol sulfate  (90 Base) MCG/ACT inhaler  Commonly known as: PROVENTIL HFA;VENTOLIN HFA;PROAIR HFA   2 puffs, Inhalation, Every 4 Hours PRN      amiodarone 200 MG tablet  Commonly known as: PACERONE   1 tablet, Oral, Every 12 Hours Scheduled      aspirin 81 MG chewable tablet   81 mg, Oral, Daily      brompheniramine-pseudoephedrine-DM 30-2-10 MG/5ML syrup   2.5 mL, Oral, 4 Times Daily PRN    "   carvedilol 25 MG tablet  Commonly known as: COREG   TAKE ONE TABLET BY MOUTH TWO TIMES A DAY WITH MEALS      Eliquis 5 MG tablet tablet  Generic drug: apixaban   5 mg, Oral, Every 12 Hours Scheduled      furosemide 40 MG tablet  Commonly known as: LASIX   40 mg, Oral, Daily PRN      isosorbide mononitrate 30 MG 24 hr tablet  Commonly known as: IMDUR   30 mg, Oral, Daily      magnesium oxide 400 MG tablet  Commonly known as: MAG-OX   400 mg, Oral, Daily      spironolactone 25 MG tablet  Commonly known as: ALDACTONE   25 mg, Oral, Daily             Stop These Medications      Corlanor 5 MG tablet tablet  Generic drug: ivabradine HCl     hydrALAZINE 25 MG tablet  Commonly known as: APRESOLINE               Discharge Diet:   Diet Instructions       Diet: Cardiac Diets; No Salt Packet; Thin (IDDSI 0)      Discharge Diet: Cardiac Diets    Cardiac Diet: No Salt Packet    Fluid Consistency: Thin (IDDSI 0)            Activity at Discharge: as tolerated     Discharge Care Plan/Instructions: see chart    Follow-up Appointments:   No future appointments.    Test Results Pending at Discharge:     Rosa Wall MD    Time: 28 min                Electronically signed by Rosa Wall MD at 09/24/23 1928       Discharge Order (From admission, onward)       Start     Ordered    09/24/23 1133  Discharge patient  Once        Expected Discharge Date: 09/24/23   Expected Discharge Time: Midday   Discharge Disposition: Home or Self Care   Physician of Record for Attribution - Please select from Treatment Team: ROSA WALL [[id number]]   Review needed by CMO to determine Physician of Record: No      Question Answer Comment   Physician of Record for Attribution - Please select from Treatment Team ROSA WALL    Review needed by CMO to determine Physician of Record No        09/24/23 1138

## 2023-09-27 ENCOUNTER — READMISSION MANAGEMENT (OUTPATIENT)
Dept: CALL CENTER | Facility: HOSPITAL | Age: 58
End: 2023-09-27
Payer: COMMERCIAL

## 2023-09-27 LAB
QT INTERVAL: 444 MS
QT INTERVAL: 534 MS
QTC INTERVAL: 510 MS
QTC INTERVAL: 512 MS

## 2023-09-27 NOTE — OUTREACH NOTE
CHF Week 1 Survey      Flowsheet Row Responses   [facility] patient discharged from? [place]   Does the patient have one of the following disease processes/diagnoses(primary or secondary)? CHF   CHF Week 1 attempt successful? No   Unsuccessful attempts Attempt 1  [wife requests we speak to pt as he can answer about his health and meds and she can't.]   Discharge diagnosis A/C CHF, AMRIT on CKD   Is patient permission given to speak with other caregiver? Yes   List who call center can speak with wife   Psychosocial issues? No            Christie ALCALA - Registered Nurse

## 2023-10-02 ENCOUNTER — READMISSION MANAGEMENT (OUTPATIENT)
Dept: CALL CENTER | Facility: HOSPITAL | Age: 58
End: 2023-10-02
Payer: COMMERCIAL

## 2023-10-02 NOTE — OUTREACH NOTE
CHF Week 1 Survey      Flowsheet Row Responses   [facility] facility patient discharged from? [place]   Does the patient have one of the following disease processes/diagnoses(primary or secondary)? CHF   CHF Week 1 attempt successful? No   Unsuccessful attempts Attempt 2            Judy LOVE - Registered Nurse

## 2023-10-06 ENCOUNTER — READMISSION MANAGEMENT (OUTPATIENT)
Dept: CALL CENTER | Facility: HOSPITAL | Age: 58
End: 2023-10-06
Payer: COMMERCIAL

## 2023-10-06 NOTE — OUTREACH NOTE
CHF Week 1 Survey      Flowsheet Row Responses   [facility] patient discharged from? [place]   Does the patient have one of the following disease processes/diagnoses(primary or secondary)? CHF   CHF Week 1 attempt successful? Yes   Call start time 0945   Call end time 0950   Is patient permission given to speak with other caregiver? Yes   Person spoke with today (if not patient) and relationship Spouse-Josie.   Meds reviewed with patient/caregiver? Yes   Is the patient having any side effects they believe may be caused by any medication additions or changes? No   Does the patient have all medications ordered at discharge? Yes   Is the patient taking all medications as directed (includes completed medication regime)? Yes   Comments regarding appointments Has seen Dr Ricardo. Dr Misty kempt next week.   Does the patient have a primary care provider?  Yes   Does the patient have an appointment with their PCP within 7 days of discharge? Yes   Has the patient kept scheduled appointments due by today? Yes   Has home health visited the patient within 72 hours of discharge? N/A   Pulse Ox monitoring None   Psychosocial issues? No   Did the patient receive a copy of their discharge instructions? Yes   Nursing interventions Reviewed instructions with patient   What is the patient's perception of their health status since discharge? Improving   Nursing interventions Nurse provided patient education   Is the patient able to teach back signs and symptoms of worsening condition? (i.e. weight gain, shortness of air, etc.) Yes   If the patient is a current smoker, are they able to teach back resources for cessation? Not a smoker   Is the patient/caregiver able to teach back the hierarchy of who to call/visit for symptoms/problems? PCP, Specialist, Home health nurse, Urgent Care, ED, 911 Yes   Is the patient able to teach back Heart Failure Zones? No   CHF Nursing Interventions Education provided on various zones   CHF  Zone this Call Green Zone   Green Zone Patient reports doing well, No new or worsening shortness of breath, Physical activity level is normal for you, No new swelling -  feet, ankles and legs look normal for you, Weight check stable, No chest pain   Green Zone Interventions Daily weight check, Meds as directed, Low sodium diet, Follow up visits planned    CHF Week 1 call completed? Yes   Is the patient interested in additional calls from an ambulatory ? No   Would this patient benefit from a Referral to [facility] Social Work? No   Wrap up additional comments Patient's spouse states patient is improving. Denies any edema, weight gain, chest pain, or SOA. Denies any needs or concerns today.   Call end time 0924            Annie AMADOR - Registered Nurse